# Patient Record
Sex: MALE | Race: WHITE | Employment: FULL TIME | URBAN - METROPOLITAN AREA
[De-identification: names, ages, dates, MRNs, and addresses within clinical notes are randomized per-mention and may not be internally consistent; named-entity substitution may affect disease eponyms.]

---

## 2017-02-03 DIAGNOSIS — E78.5 HYPERLIPIDEMIA LDL GOAL <130: Primary | ICD-10-CM

## 2017-02-03 DIAGNOSIS — E03.8 SUBCLINICAL HYPOTHYROIDISM: ICD-10-CM

## 2017-02-06 RX ORDER — PRAVASTATIN SODIUM 20 MG
TABLET ORAL
Qty: 90 TABLET | Refills: 0 | Status: SHIPPED | OUTPATIENT
Start: 2017-02-06 | End: 2017-02-21

## 2017-02-06 RX ORDER — LEVOTHYROXINE SODIUM 50 UG/1
TABLET ORAL
Qty: 90 TABLET | Refills: 0 | Status: SHIPPED | OUTPATIENT
Start: 2017-02-06 | End: 2017-02-21

## 2017-02-06 NOTE — TELEPHONE ENCOUNTER
levothyroxine (LEVOXYL) 50 MCG tablet  Last Written Prescription Date: 2/12/2016  Last Quantity: 90, # refills: 3  Last Office Visit with [facility], [facility] or  Health prescribing provider: 2/12/2016   Next 5 appointments (look out 90 days)     Feb 21, 2017  8:00 AM   PHYSICAL with Reza Lynn MD   [facility]    [address] [place] [id number]   [phone]                   TSH   Date Value Ref Range Status   02/12/2016 2.00 0.40 - 4.00 mU/L Final     pravastatin (PRAVACHOL) 20 MG tablet  Last Written Prescription Date: 2/12/2016  Last Fill Quantity: 90, # refills: 3  Last Office Visit with [facility], [facility] or [facility] prescribing provider: 2/12/2016   Next 5 appointments (look out 90 days)     Feb 21, 2017  8:00 AM   PHYSICAL with Reza Lynn MD   [facility]    [address] [place] [id number]   [phone]                   CHOL      168   2/12/2016  HDL       42   2/12/2016  LDL       98   2/12/2016  TRIG      138   2/12/2016  CHOLHDLRATIO      3.7   1/30/2015      Refill per RN protocol     Aimee Hernandez RN, BSN  [facility]

## 2017-02-21 ENCOUNTER — OFFICE VISIT (OUTPATIENT)
Dept: FAMILY MEDICINE | Facility: CLINIC | Age: 57
End: 2017-02-21
Payer: COMMERCIAL

## 2017-02-21 VITALS
DIASTOLIC BLOOD PRESSURE: 68 MMHG | WEIGHT: 182 LBS | BODY MASS INDEX: 26.96 KG/M2 | HEART RATE: 81 BPM | OXYGEN SATURATION: 98 % | TEMPERATURE: 97.9 F | SYSTOLIC BLOOD PRESSURE: 108 MMHG | HEIGHT: 69 IN

## 2017-02-21 DIAGNOSIS — Z12.11 SCREEN FOR COLON CANCER: ICD-10-CM

## 2017-02-21 DIAGNOSIS — Z51.81 MEDICATION MONITORING ENCOUNTER: ICD-10-CM

## 2017-02-21 DIAGNOSIS — E03.8 SUBCLINICAL HYPOTHYROIDISM: ICD-10-CM

## 2017-02-21 DIAGNOSIS — E78.5 HYPERLIPIDEMIA LDL GOAL <130: ICD-10-CM

## 2017-02-21 DIAGNOSIS — Z23 NEED FOR PROPHYLACTIC VACCINATION AND INOCULATION AGAINST INFLUENZA: ICD-10-CM

## 2017-02-21 DIAGNOSIS — Z12.5 SCREENING FOR PROSTATE CANCER: ICD-10-CM

## 2017-02-21 DIAGNOSIS — H40.9 GLAUCOMA OF BOTH EYES, UNSPECIFIED GLAUCOMA: ICD-10-CM

## 2017-02-21 DIAGNOSIS — Z00.00 ENCOUNTER FOR ROUTINE ADULT HEALTH EXAMINATION WITHOUT ABNORMAL FINDINGS: Primary | ICD-10-CM

## 2017-02-21 DIAGNOSIS — Z91.09 ENVIRONMENTAL ALLERGIES: ICD-10-CM

## 2017-02-21 DIAGNOSIS — Z11.59 NEED FOR HEPATITIS C SCREENING TEST: ICD-10-CM

## 2017-02-21 DIAGNOSIS — D12.6 ADENOMATOUS POLYP OF COLON: ICD-10-CM

## 2017-02-21 LAB
ALBUMIN SERPL-MCNC: 4 G/DL (ref 3.4–5)
ALBUMIN UR-MCNC: NEGATIVE MG/DL
ALP SERPL-CCNC: 66 U/L (ref 40–150)
ALT SERPL W P-5'-P-CCNC: 34 U/L (ref 0–70)
ANION GAP SERPL CALCULATED.3IONS-SCNC: 7 MMOL/L (ref 3–14)
APPEARANCE UR: CLEAR
AST SERPL W P-5'-P-CCNC: 26 U/L (ref 0–45)
BILIRUB SERPL-MCNC: 1.7 MG/DL (ref 0.2–1.3)
BILIRUB UR QL STRIP: NEGATIVE
BUN SERPL-MCNC: 17 MG/DL (ref 7–30)
CALCIUM SERPL-MCNC: 9.1 MG/DL (ref 8.5–10.1)
CHLORIDE SERPL-SCNC: 108 MMOL/L (ref 94–109)
CHOLEST SERPL-MCNC: 168 MG/DL
CK SERPL-CCNC: 165 U/L (ref 30–300)
CO2 SERPL-SCNC: 27 MMOL/L (ref 20–32)
COLOR UR AUTO: YELLOW
CREAT SERPL-MCNC: 1.14 MG/DL (ref 0.66–1.25)
CREAT UR-MCNC: 231 MG/DL
ERYTHROCYTE [DISTWIDTH] IN BLOOD BY AUTOMATED COUNT: 12.8 % (ref 10–15)
GFR SERPL CREATININE-BSD FRML MDRD: 66 ML/MIN/1.7M2
GLUCOSE SERPL-MCNC: 91 MG/DL (ref 70–99)
GLUCOSE UR STRIP-MCNC: NEGATIVE MG/DL
HCT VFR BLD AUTO: 45.9 % (ref 40–53)
HCV AB SERPL QL IA: NORMAL
HDLC SERPL-MCNC: 41 MG/DL
HGB BLD-MCNC: 15.5 G/DL (ref 13.3–17.7)
HGB UR QL STRIP: ABNORMAL
KETONES UR STRIP-MCNC: NEGATIVE MG/DL
LDLC SERPL CALC-MCNC: 103 MG/DL
LEUKOCYTE ESTERASE UR QL STRIP: NEGATIVE
MCH RBC QN AUTO: 31.5 PG (ref 26.5–33)
MCHC RBC AUTO-ENTMCNC: 33.8 G/DL (ref 31.5–36.5)
MCV RBC AUTO: 93 FL (ref 78–100)
MICROALBUMIN UR-MCNC: 9 MG/L
MICROALBUMIN/CREAT UR: 4.09 MG/G CR (ref 0–17)
MUCOUS THREADS #/AREA URNS LPF: PRESENT /LPF
NITRATE UR QL: NEGATIVE
NONHDLC SERPL-MCNC: 127 MG/DL
PH UR STRIP: 5.5 PH (ref 5–7)
PLATELET # BLD AUTO: 170 10E9/L (ref 150–450)
POTASSIUM SERPL-SCNC: 4.3 MMOL/L (ref 3.4–5.3)
PROT SERPL-MCNC: 7.3 G/DL (ref 6.8–8.8)
PSA SERPL-ACNC: 0.55 UG/L (ref 0–4)
RBC # BLD AUTO: 4.92 10E12/L (ref 4.4–5.9)
RBC #/AREA URNS AUTO: ABNORMAL /HPF (ref 0–2)
SODIUM SERPL-SCNC: 142 MMOL/L (ref 133–144)
SP GR UR STRIP: 1.02 (ref 1–1.03)
T3 SERPL-MCNC: 78 NG/DL (ref 60–181)
T4 FREE SERPL-MCNC: 1.02 NG/DL (ref 0.76–1.46)
TRIGL SERPL-MCNC: 122 MG/DL
TSH SERPL DL<=0.005 MIU/L-ACNC: 2.33 MU/L (ref 0.4–4)
URN SPEC COLLECT METH UR: ABNORMAL
UROBILINOGEN UR STRIP-ACNC: 0.2 EU/DL (ref 0.2–1)
WBC # BLD AUTO: 5.1 10E9/L (ref 4–11)
WBC #/AREA URNS AUTO: ABNORMAL /HPF (ref 0–2)

## 2017-02-21 PROCEDURE — 84480 ASSAY TRIIODOTHYRONINE (T3): CPT | Performed by: FAMILY MEDICINE

## 2017-02-21 PROCEDURE — 81001 URINALYSIS AUTO W/SCOPE: CPT | Performed by: FAMILY MEDICINE

## 2017-02-21 PROCEDURE — 80053 COMPREHEN METABOLIC PANEL: CPT | Performed by: FAMILY MEDICINE

## 2017-02-21 PROCEDURE — 99396 PREV VISIT EST AGE 40-64: CPT | Performed by: FAMILY MEDICINE

## 2017-02-21 PROCEDURE — 82043 UR ALBUMIN QUANTITATIVE: CPT | Performed by: FAMILY MEDICINE

## 2017-02-21 PROCEDURE — 84443 ASSAY THYROID STIM HORMONE: CPT | Performed by: FAMILY MEDICINE

## 2017-02-21 PROCEDURE — 84439 ASSAY OF FREE THYROXINE: CPT | Performed by: FAMILY MEDICINE

## 2017-02-21 PROCEDURE — 86803 HEPATITIS C AB TEST: CPT | Performed by: FAMILY MEDICINE

## 2017-02-21 PROCEDURE — G0103 PSA SCREENING: HCPCS | Performed by: FAMILY MEDICINE

## 2017-02-21 PROCEDURE — 85027 COMPLETE CBC AUTOMATED: CPT | Performed by: FAMILY MEDICINE

## 2017-02-21 PROCEDURE — 82550 ASSAY OF CK (CPK): CPT | Performed by: FAMILY MEDICINE

## 2017-02-21 PROCEDURE — 36415 COLL VENOUS BLD VENIPUNCTURE: CPT | Performed by: FAMILY MEDICINE

## 2017-02-21 PROCEDURE — 80061 LIPID PANEL: CPT | Performed by: FAMILY MEDICINE

## 2017-02-21 RX ORDER — PRAVASTATIN SODIUM 20 MG
20 TABLET ORAL DAILY
Qty: 90 TABLET | Refills: 3 | Status: SHIPPED | OUTPATIENT
Start: 2017-02-21 | End: 2018-02-23

## 2017-02-21 RX ORDER — LEVOTHYROXINE SODIUM 50 UG/1
50 TABLET ORAL DAILY
Qty: 90 TABLET | Refills: 3 | Status: SHIPPED | OUTPATIENT
Start: 2017-02-21 | End: 2018-02-23

## 2017-02-21 NOTE — PROGRESS NOTES
SUBJECTIVE:     CC: Tavo Johnson is an 56 year old male who presents for preventative health visit.     Seasonal allergies -- no recent problems.     Hyperlipidemia -- controlled with pravastatin.    Recent Labs   Lab Test  02/12/16   0811  01/30/15   0815  12/31/13   0809   CHOL  168  162  173   HDL  42  44  41   LDL  98  89  106   TRIG  138  143  128   CHOLHDLRATIO   --   3.7  4.2     Hypothyroidism -- controlled with levothyroxine.     TSH   Date Value Ref Range Status   02/12/2016 2.00 0.40 - 4.00 mU/L Final     Glaucoma -- bilaterally, monitored by [facility].    PVC -- stable, no problems.    Adenomatous colon polyp -- monitored by colonoscopies every 5 years.     FH CAD - no cp, no sob, no edema, declines stress echo this year    Physical   Annual:     Getting at least 3 servings of Calcium per day::  Yes    Bi-annual eye exam::  Yes    Dental care twice a year::  Yes    Sleep apnea or symptoms of sleep apnea::  Daytime drowsiness    Diet::  Regular (no restrictions)    Frequency of exercise::  2-3 days/week    Duration of exercise::  30-45 minutes    Taking medications regularly::  Yes    Medication side effects::  None    Additional concerns today::  No    Hyperlipidemia Follow-Up      Rate your low fat/cholesterol diet?: fair    Taking statin?  Yes, no muscle aches from statin    Other lipid medications/supplements?:  none     Health Maintenance     Colonoscopy:  Every 5 years, last 10/14 (due 10/19)   FIT:  Yearly, last 12/11 (due)              PSA:  Yearly, last 2/16 (due)   DEXA:  N/a    Health Maintenance Due   Topic Date Due     HEPATITIS C SCREENING  06/17/1976     FIT Q1 YR (NO INBASKET)  12/19/2012     INFLUENZA VACCINE (SYSTEM ASSIGNED)  09/01/2016     ADVANCE DIRECTIVE PLANNING Q5 YRS (NO INBASKET)  12/05/2016     TSH Q1 YEAR (NO INBASKET)  02/12/2017     LIPID MONITORING Q1 YEAR( NO INBASKET)  02/12/2017     PSA Q1 YR  02/12/2017     WELLNESS VISIT Q1 YR (NO INBASKET)  02/12/2017        Current Problem List    Patient Active Problem List   Diagnosis     Glaucoma     Seasonal allergies     Adenomatous polyp of colon     Subclinical hypothyroidism     Hyperlipidemia LDL goal <130     Advanced directives, counseling/discussion     PVC (premature ventricular contraction)     Environmental allergies       Past Medical History    Past Medical History   Diagnosis Date     Adenomatous polyp of colon 9/09, 10/14     tubular - due 5 yrs     Environmental allergies      seasonal - spring     Hyperlipidemia LDL goal <130      borderline     PVC (premature ventricular contraction) 12/15     Subclinical hypothyroidism 9/09     Unspecified glaucoma 05/2005     [facility] - bilateral       Past Surgical History    Past Surgical History   Procedure Laterality Date     Surgical history of -   6/87     HERNIA - JUNIOR     Surgical history of -   1996     RT SHOULDER - Dr Julien     Colonoscopy  9/09, 10/14     adenomatous polyps - due 5 yrs     Stress echo (metro)  3/07, 10/09, 12/11, 1/14     normal       Current Medications    Current Outpatient Prescriptions   Medication Sig Dispense Refill     pravastatin (PRAVACHOL) 20 MG tablet Take 1 tablet (20 mg) by mouth daily 90 tablet 3     levothyroxine (SYNTHROID/LEVOTHROID) 50 MCG tablet Take 1 tablet (50 mcg) by mouth daily 90 tablet 3     ASPIRIN 81 MG OR TABS ONE DAILY 100 3     XALATAN 0.005 % OP SOLN None Entered       [DISCONTINUED] pravastatin (PRAVACHOL) 20 MG tablet TAKE 1 TABLET DAILY 90 tablet 0     [DISCONTINUED] levothyroxine (SYNTHROID/LEVOTHROID) 50 MCG tablet TAKE 1 TABLET DAILY 90 tablet 0       Allergies    Allergies   Allergen Reactions     Amoxicillin      rash     Azithromycin Tinnitus     ?       Immunizations    Immunization History   Administered Date(s) Administered     Influenza (IIV3) 11/01/2014     TD (ADULT, 7+) 11/18/1996     TDAP (ADACEL AGES 11-64) 02/23/2007     Tdap (Adacel,Boostrix) 09/26/2015     Twinrix A/B 12/31/2013,  02/06/2014, 07/05/2014       Family History    Family History   Problem Relation Age of Onset     C.A.D. Father 61     age 61     C.A.LUIGI Brother 42     age 42     CANCER Paternal Grandfather      stomach ca     CANCER Paternal Grandmother      stomach ca     Pancreatic Cancer Other      1st cousin       Social History    Social History     Social History     Marital status:      Spouse name: Maribell     Number of children: 3     Years of education: 16     Occupational History      [employer]     Social History Main Topics     Smoking status: Never Smoker     Smokeless tobacco: Never Used     Alcohol use 1.2 - 1.8 oz/week     2 - 3 Standard drinks or equivalent per week      Comment: 2-3 drinks per week avg     Drug use: No     Sexual activity: Yes     Partners: Female     Birth control/ protection: Male Surgical     Other Topics Concern      Service No     Blood Transfusions No     Caffeine Concern Yes     1-2 cups qd     Occupational Exposure No     Hobby Hazards No     Sleep Concern No     Stress Concern No     Weight Concern No     Exercise Yes     lifts and treadmill - Wii Fit - snap fitness - 2-3/week     Seat Belt Yes     Parent/Sibling W/ Cabg, Mi Or Angioplasty Before 65f 55m? Yes     Social History Narrative     Today's PHQ-2 Score: Answers for HPI/ROS submitted by the patient on 2/20/2017   PHQ-2 Depressed: Not at all, Not at all  PHQ-2 Score: 0    PHQ-2 ( 1999 Pfizer) 2/20/2017   Little interest or pleasure in doing things Not at all   Feeling down, depressed or hopeless Not at all   PHQ-2 Score 0       Abuse: Current or Past(Physical, Sexual or Emotional)- No  Do you feel safe in your environment - Yes    Social History   Substance Use Topics     Smoking status: Never Smoker     Smokeless tobacco: Never Used     Alcohol use 1.2 - 1.8 oz/week     2 - 3 Standard drinks or equivalent per week      Comment: 2-3 drinks per week avg     The patient does not drink >3 drinks per day nor >7  "drinks per week.    Last PSA:   PSA   Date Value Ref Range Status   02/12/2016 0.54 0 - 4 ug/L Final       Recent Labs   Lab Test  02/12/16   0811  01/30/15   0815  12/31/13   0809   CHOL  168  162  173   HDL  42  44  41   LDL  98  89  106   TRIG  138  143  128   CHOLHDLRATIO   --   3.7  4.2   NHDL  126   --    --        Reviewed orders with patient. Reviewed health maintenance and updated orders accordingly - Yes    All Histories reviewed and updated in Epic.    ROS:  10 point ROS of systems including Constitutional, Eyes, Respiratory, Cardiovascular, Gastroenterology, Genitourinary, Integumentary, Muscularskeletal, Psychiatric were all negative except for pertinent positives noted in my HPI.    OBJECTIVE:     /68  Pulse 81  Temp 97.9  F (36.6  C) (Oral)  Ht 5' 9\" (1.753 m)  Wt 182 lb (82.6 kg)  SpO2 98%  BMI 26.88 kg/m2  EXAM:  GENERAL: healthy, alert and no distress  EYES: Eyes grossly normal to inspection, PERRL and conjunctivae and sclerae normal  HENT: ear canals and TM's normal, nose and mouth without ulcers or lesions  RESP: lungs clear to auscultation - no rales, rhonchi or wheezes  CV: regular rate and rhythm, normal S1 S2, no S3 or S4, no murmur, click or rub, no peripheral edema and peripheral pulses strong- PVC  ABDOMEN: soft, nontender, no hepatosplenomegaly, no masses and bowel sounds normal   (male): normal male genitalia without lesions or urethral discharge, no hernia - mildly tender rt epididymis  RECTAL: normal sphincter tone, no rectal masses, prostate trace increased size, smooth, nontender without nodules or masses  MS: no gross musculoskeletal defects noted, no edema  SKIN: no suspicious lesions or rashes  NEURO: mentation intact and speech normal  PSYCH: mentation appears normal, affect normal/bright    Results for orders placed or performed in visit on 02/21/17 (from the past 24 hour(s))   CBC with platelets   Result Value Ref Range    WBC 5.1 4.0 - 11.0 10e9/L    RBC Count 4.92 4.4 " - 5.9 10e12/L    Hemoglobin 15.5 13.3 - 17.7 g/dL    Hematocrit 45.9 40.0 - 53.0 %    MCV 93 78 - 100 fl    MCH 31.5 26.5 - 33.0 pg    MCHC 33.8 31.5 - 36.5 g/dL    RDW 12.8 10.0 - 15.0 %    Platelet Count 170 150 - 450 10e9/L   *UA reflex to Microscopic   Result Value Ref Range    Color Urine Yellow     Appearance Urine Clear     Glucose Urine Negative NEG mg/dL    Bilirubin Urine Negative NEG    Ketones Urine Negative NEG mg/dL    Specific Gravity Urine 1.020 1.003 - 1.035    Blood Urine Trace (A) NEG    pH Urine 5.5 5.0 - 7.0 pH    Protein Albumin Urine Negative NEG mg/dL    Urobilinogen Urine 0.2 0.2 - 1.0 EU/dL    Nitrite Urine Negative NEG    Leukocyte Esterase Urine Negative NEG    Source Midstream Urine    Urine Microscopic   Result Value Ref Range    WBC Urine O - 2 0 - 2 /HPF    RBC Urine O - 2 0 - 2 /HPF    Mucous Urine Present (A) NEG /LPF     ASSESSMENT/PLAN:         ICD-10-CM    1. Encounter for routine adult health examination without abnormal findings Z00.00 Comprehensive metabolic panel     Lipid panel reflex to direct LDL     CK total     CBC with platelets     *UA reflex to Microscopic     Albumin Random Urine Quantitative     TSH with free T4 reflex     Prostate spec antigen screen     Fecal colorectal cancer screen (FIT)     T4, free     T3, total     Urine Microscopic   2. Hyperlipidemia LDL goal <130 E78.5 Comprehensive metabolic panel     Lipid panel reflex to direct LDL     CK total     pravastatin (PRAVACHOL) 20 MG tablet   3. Subclinical hypothyroidism E03.9 TSH with free T4 reflex     T4, free     T3, total     levothyroxine (SYNTHROID/LEVOTHROID) 50 MCG tablet   4. Environmental allergies Z91.09    5. Glaucoma of both eyes, unspecified glaucoma H40.9    6. Adenomatous polyp of colon D12.6 Fecal colorectal cancer screen (FIT)   7. Screening for prostate cancer Z12.5 Prostate spec antigen screen   8. Screen for colon cancer Z12.11 Fecal colorectal cancer screen (FIT)   9. Medication  monitoring encounter Z51.81 Comprehensive metabolic panel     Lipid panel reflex to direct LDL     CK total     CBC with platelets     *UA reflex to Microscopic     Albumin Random Urine Quantitative     TSH with free T4 reflex     T4, free     T3, total   10. Need for hepatitis C screening test Z11.59 Hepatitis C Screen Reflex to HCV RNA Quant and Genotype   11. Need for prophylactic vaccination and inoculation against influenza Z23      Discussed treatment/modality options, including risk and benefits, he desires advised alcohol consumption 1oz per day or less, advised aspirin 81 mg po daily, advised 1 multivitamin per day, advised calcium 1437-5730 mg/d and Vitamin D 800-1200 IU/d, advised dentist every 6 months, advised diet and exercise, advised opthalmologist every 1-2 years, advised self testicular exam q month, further health care maintenance and medication refill(pravastatin, levothyroxine). All diagnosis above reviewed and noted above, otherwise stable.  [facility].  Follow up as needed - every 4-6 months.    Refilled pravastatin and levothyroxine.     Tavo declined the influenza vaccine today.     Tavo declined scheduling a stress test.     Health Maintenance Due   Topic Date Due     HEPATITIS C SCREENING  06/17/1976     FIT Q1 YR (NO INBASKET)  12/19/2012     INFLUENZA VACCINE (SYSTEM ASSIGNED)  09/01/2016     ADVANCE DIRECTIVE PLANNING Q5 YRS (NO INBASKET)  12/05/2016     TSH Q1 YEAR (NO INBASKET)  02/12/2017     LIPID MONITORING Q1 YEAR( NO INBASKET)  02/12/2017     PSA Q1 YR  02/12/2017     WELLNESS VISIT Q1 YR (NO INBASKET)  02/12/2017       COUNSELING:   Reviewed preventive health counseling, as reflected in patient instructions       Regular exercise       Healthy diet/nutrition       Vision screening       Hearing screening       Aspirin Prophylaxsis       Alcohol Use     reports that he has never smoked. He has never used smokeless tobacco.    Estimated body mass  "index is 26.88 kg/(m^2) as calculated from the following:    Height as of this encounter: 5' 9\" (1.753 m).    Weight as of this encounter: 182 lb (82.6 kg).     Counseling Resources:  ATP IV Guidelines  Pooled Cohorts Equation Calculator  FRAX Risk Assessment  ICSI Preventive Guidelines  Dietary Guidelines for Americans, 2010  USDA's MyPlate  ASA Prophylaxis  Lung CA Screening    This document serves as a record of the services and decisions personally performed and made by Reza Lynn MD [facility]. It was created on their behalf by Aletha Pedroza, a trained medical scribe. The creation of this document is based the provider's statements to the medical scribe.  Aletha Pedroza February 21, 2017 8:17 AM              [facility] Lynn MD, [facility]    [address]  [address]    [phone] Fax    "

## 2017-02-21 NOTE — MR AVS SNAPSHOT
After Visit Summary   2/21/2017    Tavo Johnson    MRN: [id number]           Patient Information     Date Of Birth          1960        Visit Information        Provider Department      2/21/2017 8:00 AM Reza Lynn MD [facility]        Today's Diagnoses     Encounter for routine adult health examination without abnormal findings    -  1    Hyperlipidemia LDL goal <130        Subclinical hypothyroidism        Environmental allergies        Glaucoma, unspecified glaucoma, unspecified laterality        Adenomatous polyp of colon        Screening for prostate cancer        Screen for colon cancer        Medication monitoring encounter        Need for hepatitis C screening test        Need for prophylactic vaccination and inoculation against influenza          Care Instructions        [facility]                        To reach your care team during and after hours:   [phone]  To reach our pharmacy:        [phone]    Clinic Hours                        Our clinic hours are:    Monday   7:30 am to 7:00 pm                  Tuesday through Friday 7:30 am to 5:00 pm                             Saturday   8:00 am to 12:00 pm      Sunday   Closed      Pharmacy Hours                        Our pharmacy hours are:    Monday   8:30 am to 7:00 pm       Tuesday to Friday  8:30 am to 6:00 pm                       Saturday    9:00 am to 1:00 pm              Sunday    Closed              There is also information available at our web site:  [email or web address]    If your provider ordered any lab tests and you do not receive the results within 10 business days, please call the clinic.    If you need a medication refill please contact your pharmacy.  Please allow 2-3 business days for your refill to be completed.    Our clinic offers telephone visits and e visits.  Please ask one of your team members to explain more.      Use [employer] (secure email communication and access  to your chart) to send your primary care provider a message or make an appointment. Ask someone on your Team how to sign up for [facility].  Immunizations                      Immunization History   Administered Date(s) Administered     Influenza (IIV3) 11/01/2014     TD (ADULT, 7+) 11/18/1996     TDAP (ADACEL AGES 11-64) 02/23/2007     Tdap (Adacel,Boostrix) 09/26/2015     Twinrix A/B 12/31/2013, 02/06/2014, 07/05/2014        Health Maintenance                         Health Maintenance Due   Topic Date Due     Hepatitis C Screening  06/17/1976     Colon Cancer Screening - FIT Test - yearly  12/19/2012     Flu Vaccine - yearly  09/01/2016     Discuss Advance Directive Planning  12/05/2016     Thyroid Function Lab (TSH) - yearly  02/12/2017     Cholesterol Lab - yearly  02/12/2017     Prostate Test (PSA) - yearly  02/12/2017     Wellness Visit with your Primary Provider - yearly  02/12/2017       Preventive Health Recommendations  Male Ages 50 - 64    Yearly exam:             See your health care provider every year in order to  o   Review health changes.   o   Discuss preventive care.    o   Review your medicines if your doctor has prescribed any.     Have a cholesterol test every 5 years, or more frequently if you are at risk for high cholesterol/heart disease.     Have a diabetes test (fasting glucose) every three years. If you are at risk for diabetes, you should have this test more often.     Have a colonoscopy at age 50, or have a yearly FIT test (stool test). These exams will check for colon cancer.      Talk with your health care provider about whether or not a prostate cancer screening test (PSA) is right for you.    You should be tested each year for STDs (sexually transmitted diseases), if you re at risk.     Shots: Get a flu shot each year. Get a tetanus shot every 10 years.     Nutrition:    Eat at least 5 servings of fruits and vegetables daily.     Eat whole-grain bread, whole-wheat pasta and brown rice  instead of white grains and rice.     Talk to your provider about Calcium and Vitamin D.     Lifestyle    Exercise for at least 150 minutes a week (30 minutes a day, 5 days a week). This will help you control your weight and prevent disease.     Limit alcohol to one drink per day.     No smoking.     Wear sunscreen to prevent skin cancer.     See your dentist every six months for an exam and cleaning.     See your eye doctor every 1 to 2 years.          Follow-ups after your visit        Future tests that were ordered for you today     Open Future Orders        Priority Expected Expires Ordered    Fecal colorectal cancer screen (FIT) Routine 3/14/2017 5/16/2017 2/21/2017            Who to contact     If you have questions or need follow up information about today's clinic visit or your schedule please contact [facility] directly at [phone].  Normal or non-critical lab and imaging results will be communicated to you by [employer], letter or phone within 4 business days after the clinic has received the results. If you do not hear from us within 7 days, please contact the clinic through [employer] or phone. If you have a critical or abnormal lab result, we will notify you by phone as soon as possible.  Submit refill requests through [employer] or call your pharmacy and they will forward the refill request to us. Please allow 3 business days for your refill to be completed.          Additional Information About Your Visit        [employer]ia Information     [employer]ia gives you secure access to your electronic health record. If you see a primary care provider, you can also send messages to your care team and make appointments. If you have questions, please call your primary care clinic.  If you do not have a primary care provider, please call [phone] and they will assist you.        Care EveryWhere ID     This is your Care EveryWhere ID. This could be used by other organizations to access your [facility]  "medical records  [id number]        Your Vitals Were     Pulse Temperature Height Pulse Oximetry BMI (Body Mass Index)       81 97.9  F (36.6  C) (Oral) 5' 9\" (1.753 m) 98% 26.88 kg/m2        Blood Pressure from Last 3 Encounters:   02/21/17 108/68   02/12/16 118/80   01/18/16 128/84    Weight from Last 3 Encounters:   02/21/17 182 lb (82.6 kg)   02/12/16 183 lb 2 oz (83.1 kg)   01/18/16 185 lb (83.9 kg)              We Performed the Following     *UA reflex to Microscopic     Albumin Random Urine Quantitative     CBC with platelets     CK total     Comprehensive metabolic panel     Hepatitis C Screen Reflex to HCV RNA Quant and Genotype     Lipid panel reflex to direct LDL     Prostate spec antigen screen     T3, total     T4, free     TSH with free T4 reflex     Urine Microscopic          Today's Medication Changes          These changes are accurate as of: 2/21/17  8:51 AM.  If you have any questions, ask your nurse or doctor.               These medicines have changed or have updated prescriptions.        Dose/Directions    levothyroxine 50 MCG tablet   Commonly known as:  SYNTHROID/LEVOTHROID   This may have changed:  See the new instructions.   Used for:  Subclinical hypothyroidism   Changed by:  Reza Lynn MD        Dose:  50 mcg   Take 1 tablet (50 mcg) by mouth daily   Quantity:  90 tablet   Refills:  3       pravastatin 20 MG tablet   Commonly known as:  PRAVACHOL   This may have changed:  See the new instructions.   Used for:  Hyperlipidemia LDL goal <130   Changed by:  Reza Lynn MD        Dose:  20 mg   Take 1 tablet (20 mg) by mouth daily   Quantity:  90 tablet   Refills:  3         Stop taking these medicines if you haven't already. Please contact your care team if you have questions.     triamcinolone 0.5 % cream   Commonly known as:  KENALOG   Stopped by:  Reza Lynn MD                Where to get your medicines      These medications were sent to [facility] - [facility] [place] " - [address]  [address] [address]     Phone:  [phone]     levothyroxine 50 MCG tablet    pravastatin 20 MG tablet                Primary Care Provider Office Phone # Fax #    Reza Lynn [phone]       [address] [address]        Thank you!     Thank you for choosing [facility]  for your care. Our goal is always to provide you with excellent care. Hearing back from our patients is one way we can continue to improve our services. Please take a few minutes to complete the written survey that you may receive in the mail after your visit with us. Thank you!             Your Updated Medication List - Protect others around you: Learn how to safely use, store and throw away your medicines at www.disposemymeds.org.          This list is accurate as of: 2/21/17  8:51 AM.  Always use your most recent med list.                   Brand Name Dispense Instructions for use    aspirin 81 MG tablet     100    ONE DAILY       levothyroxine 50 MCG tablet    SYNTHROID/LEVOTHROID    90 tablet    Take 1 tablet (50 mcg) by mouth daily       pravastatin 20 MG tablet    PRAVACHOL    90 tablet    Take 1 tablet (20 mg) by mouth daily       XALATAN 0.005 % ophthalmic solution   Generic drug:  latanoprost      None Entered

## 2017-02-21 NOTE — NURSING NOTE
"Chief Complaint   Patient presents with     Physical       Initial /68  Pulse 81  Temp 97.9  F (36.6  C) (Oral)  Ht 5' 9\" (1.753 m)  Wt 182 lb (82.6 kg)  SpO2 98%  BMI 26.88 kg/m2 Estimated body mass index is 26.88 kg/(m^2) as calculated from the following:    Height as of this encounter: 5' 9\" (1.753 m).    Weight as of this encounter: 182 lb (82.6 kg)..  BP completed using cuff size: roderick Dozier MA  "

## 2017-02-21 NOTE — PATIENT INSTRUCTIONS
[facility]                        To reach your care team during and after hours:   [phone]  To reach our pharmacy:        [phone]    Clinic Hours                        Our clinic hours are:    Monday   7:30 am to 7:00 pm                  Tuesday through Friday 7:30 am to 5:00 pm                             Saturday   8:00 am to 12:00 pm      Sunday   Closed      Pharmacy Hours                        Our pharmacy hours are:    Monday   8:30 am to 7:00 pm       Tuesday to Friday  8:30 am to 6:00 pm                       Saturday    9:00 am to 1:00 pm              Sunday    Closed              There is also information available at our web site:  [email or web address]    If your provider ordered any lab tests and you do not receive the results within 10 business days, please call the clinic.    If you need a medication refill please contact your pharmacy.  Please allow 2-3 business days for your refill to be completed.    Our clinic offers telephone visits and e visits.  Please ask one of your team members to explain more.      Use [employer] (secure email communication and access to your chart) to send your primary care provider a message or make an appointment. Ask someone on your Team how to sign up for [employer].  Immunizations                      Immunization History   Administered Date(s) Administered     Influenza (IIV3) 11/01/2014     TD (ADULT, 7+) 11/18/1996     TDAP (ADACEL AGES 11-64) 02/23/2007     Tdap (Adacel,Boostrix) 09/26/2015     Twinrix A/B 12/31/2013, 02/06/2014, 07/05/2014        Health Maintenance                         Health Maintenance Due   Topic Date Due     Hepatitis C Screening  06/17/1976     Colon Cancer Screening - FIT Test - yearly  12/19/2012     Flu Vaccine - yearly  09/01/2016     Discuss Advance Directive Planning  12/05/2016     Thyroid Function Lab (TSH) - yearly  02/12/2017     Cholesterol Lab - yearly  02/12/2017     Prostate Test (PSA) - yearly   02/12/2017     Wellness Visit with your Primary Provider - yearly  02/12/2017       Preventive Health Recommendations  Male Ages 50 - 64    Yearly exam:             See your health care provider every year in order to  o   Review health changes.   o   Discuss preventive care.    o   Review your medicines if your doctor has prescribed any.     Have a cholesterol test every 5 years, or more frequently if you are at risk for high cholesterol/heart disease.     Have a diabetes test (fasting glucose) every three years. If you are at risk for diabetes, you should have this test more often.     Have a colonoscopy at age 50, or have a yearly FIT test (stool test). These exams will check for colon cancer.      Talk with your health care provider about whether or not a prostate cancer screening test (PSA) is right for you.    You should be tested each year for STDs (sexually transmitted diseases), if you re at risk.     Shots: Get a flu shot each year. Get a tetanus shot every 10 years.     Nutrition:    Eat at least 5 servings of fruits and vegetables daily.     Eat whole-grain bread, whole-wheat pasta and brown rice instead of white grains and rice.     Talk to your provider about Calcium and Vitamin D.     Lifestyle    Exercise for at least 150 minutes a week (30 minutes a day, 5 days a week). This will help you control your weight and prevent disease.     Limit alcohol to one drink per day.     No smoking.     Wear sunscreen to prevent skin cancer.     See your dentist every six months for an exam and cleaning.     See your eye doctor every 1 to 2 years.

## 2017-02-21 NOTE — LETTER
[facility]  [address] [place] [address], [place] [address]                  [phone]   February 23, 2017    Tavo Johnson  [address] [place] [id number]      Dear Tavo,    Here is a summary of your recent test results:    Labs are overall quite good, except mild benign elevation of bilirubin.     We advise:     Continue current cares.   Balanced low cholesterol diet and regular exercise.   Recheck bilirubin in 3-4 months.     Your test results are enclosed.      Please contact me if you have any questions.      Please call us at [phone] (or use [employer]) to address the above recommendations.            Thank you very much for trusting [facility]..     Healthy regards,        Reza Lynn M.D.        Results for orders placed or performed in visit on 02/21/17   Comprehensive metabolic panel   Result Value Ref Range    Sodium 142 133 - 144 mmol/L    Potassium 4.3 3.4 - 5.3 mmol/L    Chloride 108 94 - 109 mmol/L    Carbon Dioxide 27 20 - 32 mmol/L    Anion Gap 7 3 - 14 mmol/L    Glucose 91 70 - 99 mg/dL    Urea Nitrogen 17 7 - 30 mg/dL    Creatinine 1.14 0.66 - 1.25 mg/dL    GFR Estimate 66 >60 mL/min/1.7m2    GFR Estimate If Black 80 >60 mL/min/1.7m2    Calcium 9.1 8.5 - 10.1 mg/dL    Bilirubin Total 1.7 (H) 0.2 - 1.3 mg/dL    Albumin 4.0 3.4 - 5.0 g/dL    Protein Total 7.3 6.8 - 8.8 g/dL    Alkaline Phosphatase 66 40 - 150 U/L    ALT 34 0 - 70 U/L    AST 26 0 - 45 U/L   Lipid panel reflex to direct LDL   Result Value Ref Range    Cholesterol 168 <200 mg/dL    Triglycerides 122 <150 mg/dL    HDL Cholesterol 41 >39 mg/dL    LDL Cholesterol Calculated 103 (H) <100 mg/dL    Non HDL Cholesterol 127 <130 mg/dL   CK total   Result Value Ref Range    CK Total 165 30 - 300 U/L   CBC with platelets   Result Value Ref Range    WBC 5.1 4.0 - 11.0 10e9/L    RBC Count 4.92 4.4 - 5.9 10e12/L    Hemoglobin 15.5 13.3 - 17.7 g/dL    Hematocrit 45.9 40.0 -  53.0 %    MCV 93 78 - 100 fl    MCH 31.5 26.5 - 33.0 pg    MCHC 33.8 31.5 - 36.5 g/dL    RDW 12.8 10.0 - 15.0 %    Platelet Count 170 150 - 450 10e9/L   *UA reflex to Microscopic   Result Value Ref Range    Color Urine Yellow     Appearance Urine Clear     Glucose Urine Negative NEG mg/dL    Bilirubin Urine Negative NEG    Ketones Urine Negative NEG mg/dL    Specific Gravity Urine 1.020 1.003 - 1.035    Blood Urine Trace (A) NEG    pH Urine 5.5 5.0 - 7.0 pH    Protein Albumin Urine Negative NEG mg/dL    Urobilinogen Urine 0.2 0.2 - 1.0 EU/dL    Nitrite Urine Negative NEG    Leukocyte Esterase Urine Negative NEG    Source Midstream Urine    Albumin Random Urine Quantitative   Result Value Ref Range    Creatinine Urine 231 mg/dL    Albumin Urine mg/L 9 mg/L    Albumin Urine mg/g Cr 4.09 0 - 17 mg/g Cr   TSH with free T4 reflex   Result Value Ref Range    TSH 2.33 0.40 - 4.00 mU/L   Prostate spec antigen screen   Result Value Ref Range    PSA 0.55 0 - 4 ug/L   T4, free   Result Value Ref Range    T4 Free 1.02 0.76 - 1.46 ng/dL   T3, total   Result Value Ref Range    Triiodothyronine (T3) 78 60 - 181 ng/dL   Hepatitis C Screen Reflex to HCV RNA Quant and Genotype   Result Value Ref Range    Hepatitis C Antibody  NR     Nonreactive   Assay performance characteristics have not been established for newborns,   infants, and children     Urine Microscopic   Result Value Ref Range    WBC Urine O - 2 0 - 2 /HPF    RBC Urine O - 2 0 - 2 /HPF    Mucous Urine Present (A) NEG /LPF

## 2017-02-27 PROCEDURE — 82274 ASSAY TEST FOR BLOOD FECAL: CPT | Performed by: FAMILY MEDICINE

## 2017-02-28 DIAGNOSIS — D12.6 ADENOMATOUS POLYP OF COLON: ICD-10-CM

## 2017-02-28 DIAGNOSIS — Z12.11 SCREEN FOR COLON CANCER: ICD-10-CM

## 2017-02-28 DIAGNOSIS — Z00.00 ENCOUNTER FOR ROUTINE ADULT HEALTH EXAMINATION WITHOUT ABNORMAL FINDINGS: ICD-10-CM

## 2017-03-01 LAB — HEMOCCULT STL QL IA: NEGATIVE

## 2018-02-23 ENCOUNTER — OFFICE VISIT (OUTPATIENT)
Dept: FAMILY MEDICINE | Facility: CLINIC | Age: 58
End: 2018-02-23
Payer: COMMERCIAL

## 2018-02-23 VITALS
SYSTOLIC BLOOD PRESSURE: 102 MMHG | WEIGHT: 181 LBS | TEMPERATURE: 97.6 F | HEIGHT: 69 IN | BODY MASS INDEX: 26.81 KG/M2 | HEART RATE: 85 BPM | OXYGEN SATURATION: 99 % | DIASTOLIC BLOOD PRESSURE: 72 MMHG

## 2018-02-23 DIAGNOSIS — Z00.00 ENCOUNTER FOR ROUTINE ADULT HEALTH EXAMINATION WITHOUT ABNORMAL FINDINGS: Primary | ICD-10-CM

## 2018-02-23 DIAGNOSIS — Z12.11 SCREEN FOR COLON CANCER: ICD-10-CM

## 2018-02-23 DIAGNOSIS — Z12.5 SCREENING FOR PROSTATE CANCER: ICD-10-CM

## 2018-02-23 DIAGNOSIS — Z51.81 MEDICATION MONITORING ENCOUNTER: ICD-10-CM

## 2018-02-23 DIAGNOSIS — E78.5 HYPERLIPIDEMIA LDL GOAL <130: ICD-10-CM

## 2018-02-23 DIAGNOSIS — D12.6 ADENOMATOUS POLYP OF COLON, UNSPECIFIED PART OF COLON: ICD-10-CM

## 2018-02-23 DIAGNOSIS — I49.3 PVC (PREMATURE VENTRICULAR CONTRACTION): ICD-10-CM

## 2018-02-23 DIAGNOSIS — E03.8 SUBCLINICAL HYPOTHYROIDISM: ICD-10-CM

## 2018-02-23 DIAGNOSIS — Z91.09 ENVIRONMENTAL ALLERGIES: ICD-10-CM

## 2018-02-23 DIAGNOSIS — H40.9 GLAUCOMA OF BOTH EYES, UNSPECIFIED GLAUCOMA TYPE: ICD-10-CM

## 2018-02-23 LAB
ERYTHROCYTE [DISTWIDTH] IN BLOOD BY AUTOMATED COUNT: 12.6 % (ref 10–15)
HCT VFR BLD AUTO: 45.3 % (ref 40–53)
HGB BLD-MCNC: 15 G/DL (ref 13.3–17.7)
MCH RBC QN AUTO: 31.2 PG (ref 26.5–33)
MCHC RBC AUTO-ENTMCNC: 33.1 G/DL (ref 31.5–36.5)
MCV RBC AUTO: 94 FL (ref 78–100)
PLATELET # BLD AUTO: 181 10E9/L (ref 150–450)
RBC # BLD AUTO: 4.81 10E12/L (ref 4.4–5.9)
WBC # BLD AUTO: 6.3 10E9/L (ref 4–11)

## 2018-02-23 PROCEDURE — 84439 ASSAY OF FREE THYROXINE: CPT | Performed by: FAMILY MEDICINE

## 2018-02-23 PROCEDURE — 84443 ASSAY THYROID STIM HORMONE: CPT | Performed by: FAMILY MEDICINE

## 2018-02-23 PROCEDURE — 82550 ASSAY OF CK (CPK): CPT | Performed by: FAMILY MEDICINE

## 2018-02-23 PROCEDURE — 85027 COMPLETE CBC AUTOMATED: CPT | Performed by: FAMILY MEDICINE

## 2018-02-23 PROCEDURE — 82043 UR ALBUMIN QUANTITATIVE: CPT | Performed by: FAMILY MEDICINE

## 2018-02-23 PROCEDURE — 99396 PREV VISIT EST AGE 40-64: CPT | Performed by: FAMILY MEDICINE

## 2018-02-23 PROCEDURE — 86376 MICROSOMAL ANTIBODY EACH: CPT | Performed by: FAMILY MEDICINE

## 2018-02-23 PROCEDURE — G0103 PSA SCREENING: HCPCS | Performed by: FAMILY MEDICINE

## 2018-02-23 PROCEDURE — 80053 COMPREHEN METABOLIC PANEL: CPT | Performed by: FAMILY MEDICINE

## 2018-02-23 PROCEDURE — 80061 LIPID PANEL: CPT | Performed by: FAMILY MEDICINE

## 2018-02-23 PROCEDURE — 36415 COLL VENOUS BLD VENIPUNCTURE: CPT | Performed by: FAMILY MEDICINE

## 2018-02-23 RX ORDER — PRAVASTATIN SODIUM 20 MG
20 TABLET ORAL DAILY
Qty: 90 TABLET | Refills: 3 | Status: SHIPPED | OUTPATIENT
Start: 2018-02-23 | End: 2019-02-28

## 2018-02-23 RX ORDER — LEVOTHYROXINE SODIUM 50 UG/1
50 TABLET ORAL DAILY
Qty: 90 TABLET | Refills: 3 | Status: SHIPPED | OUTPATIENT
Start: 2018-02-23 | End: 2019-02-28

## 2018-02-23 NOTE — LETTER
[facility]  [address] [place] [address], [place] [address]                  [phone]   March 2, 2018    Tavo Johnson  [address] [facility] [place] [id number]      Dear Tavo,    Here is a summary of your recent test results:    Labs are overall quite good.     We advise:     Continue current cares.   Balanced low cholesterol diet.   Regular exercise.     For additional lab test information, [email or web address] is an excellent reference.     Your test results are enclosed.      Please contact me if you have any questions.    In addition, here is a list of due or overdue Health Maintenance reminders.    Health Maintenance Due   Topic Date Due     Colon Cancer Screening - FIT Test - yearly  02/27/2018     Please call us at [phone] (or use [employer]) to address the above recommendations.            Thank you very much for trusting [facility]..     Healthy regards,        Reza Lynn M.D.      Results for orders placed or performed in visit on 02/23/18   Comprehensive metabolic panel   Result Value Ref Range    Sodium 141 133 - 144 mmol/L    Potassium 4.0 3.4 - 5.3 mmol/L    Chloride 107 94 - 109 mmol/L    Carbon Dioxide 25 20 - 32 mmol/L    Anion Gap 9 3 - 14 mmol/L    Glucose 89 70 - 99 mg/dL    Urea Nitrogen 19 7 - 30 mg/dL    Creatinine 1.11 0.66 - 1.25 mg/dL    GFR Estimate 68 >60 mL/min/1.7m2    GFR Estimate If Black 82 >60 mL/min/1.7m2    Calcium 8.5 8.5 - 10.1 mg/dL    Bilirubin Total 1.9 (H) 0.2 - 1.3 mg/dL    Albumin 4.1 3.4 - 5.0 g/dL    Protein Total 7.2 6.8 - 8.8 g/dL    Alkaline Phosphatase 61 40 - 150 U/L    ALT 25 0 - 70 U/L    AST 22 0 - 45 U/L   Lipid panel reflex to direct LDL Fasting   Result Value Ref Range    Cholesterol 168 <200 mg/dL    Triglycerides 113 <150 mg/dL    HDL Cholesterol 47 >39 mg/dL    LDL Cholesterol Calculated 98 <100 mg/dL    Non HDL Cholesterol 121 <130 mg/dL   CK total   Result Value Ref Range    CK Total 111  30 - 300 U/L   CBC with platelets   Result Value Ref Range    WBC 6.3 4.0 - 11.0 10e9/L    RBC Count 4.81 4.4 - 5.9 10e12/L    Hemoglobin 15.0 13.3 - 17.7 g/dL    Hematocrit 45.3 40.0 - 53.0 %    MCV 94 78 - 100 fl    MCH 31.2 26.5 - 33.0 pg    MCHC 33.1 31.5 - 36.5 g/dL    RDW 12.6 10.0 - 15.0 %    Platelet Count 181 150 - 450 10e9/L   TSH with free T4 reflex   Result Value Ref Range    TSH 3.41 0.40 - 4.00 mU/L   Albumin Random Urine Quantitative with Creat Ratio   Result Value Ref Range    Creatinine Urine 160 mg/dL    Albumin Urine mg/L <5 mg/L    Albumin Urine mg/g Cr Unable to calculate due to low value 0 - 17 mg/g Cr   T4 free   Result Value Ref Range    T4 Free 0.96 0.76 - 1.46 ng/dL   Prostate spec antigen screen   Result Value Ref Range    PSA 0.57 0 - 4 ug/L   Thyroid peroxidase antibody   Result Value Ref Range    Thyroid Peroxidase Antibody <10 <35 IU/mL

## 2018-02-23 NOTE — PATIENT INSTRUCTIONS
[facility]                        To reach your care team during and after hours:   [phone]  To reach our pharmacy:        [phone]    Clinic Hours                        Our clinic hours are:    Monday   7:30 am to 7:00 pm                  Tuesday through Friday 7:30 am to 5:00 pm                             Saturday   8:00 am to 12:00 pm      Sunday   Closed      Pharmacy Hours                        Our pharmacy hours are:    Monday   8:30 am to 7:00 pm       Tuesday to Friday  8:30 am to 6:00 pm                       Saturday    9:00 am to 1:00 pm              Sunday    Closed              There is also information available at our web site:  [email or web address]    If your provider ordered any lab tests and you do not receive the results within 10 business days, please call the clinic.    If you need a medication refill please contact your pharmacy.  Please allow 2-3 business days for your refill to be completed.    Our clinic offers telephone visits and e visits.  Please ask one of your team members to explain more.      Use [employer] (secure email communication and access to your chart) to send your primary care provider a message or make an appointment. Ask someone on your Team how to sign up for [employer].  Immunizations                      Immunization History   Administered Date(s) Administered     Influenza (IIV3) PF 11/01/2014     TD (ADULT, 7+) 11/18/1996     TDAP Vaccine (Adacel) 02/23/2007     Tdap (Adacel,Boostrix) 09/26/2015     Twinrix A/B 12/31/2013, 02/06/2014, 07/05/2014        Health Maintenance                         Health Maintenance Due   Topic Date Due     Discuss Advance Directive Planning  12/05/2016     Flu Vaccine - yearly  09/01/2017     Thyroid Function Lab (TSH) - yearly  02/21/2018     Cholesterol Lab - yearly  02/21/2018     Prostate Test (PSA) - yearly  02/21/2018     Wellness Visit with your Primary Provider - yearly  02/21/2018     Colon Cancer  Screening - FIT Test - yearly  02/27/2018

## 2018-02-23 NOTE — PROGRESS NOTES
SUBJECTIVE:   CC: Tavo Johnson is an 57 year old male who presents for preventative health visit.     Physical   Annual:     Getting at least 3 servings of Calcium per day::  Yes    Bi-annual eye exam::  Yes    Dental care twice a year::  Yes    Sleep apnea or symptoms of sleep apnea::  Daytime drowsiness    Diet::  Regular (no restrictions)    Frequency of exercise::  2-3 days/week    Duration of exercise::  45-60 minutes    Taking medications regularly::  Yes    Medication side effects::  None    Additional concerns today::  YES          PVC's -- No concerns at this time.     Bowels -- Tavo has been using OTC probiotic supplements to help keep bowels regular.     Glaucoma -- Xalatan solution eyedrops, as directed. Followed by [facility].     Knee Pain -- Bilateral knees aching X many years but becoming more bothersome - no known injury recently.     Hypothyroidism -- Levothyroxine 50 mcg daily.     TSH   Date Value Ref Range Status   02/21/2017 2.33 0.40 - 4.00 mU/L Final   02/12/2016 2.00 0.40 - 4.00 mU/L Final   01/30/2015 2.46 0.40 - 4.00 mU/L Final     Comment:     Effective 7/30/2014, the reference range for this assay has changed to reflect   new instrumentation/methodology.     12/31/2013 3.79 0.4 - 5.0 mU/L Final   12/07/2012 3.56 0.4 - 5.0 mU/L Final     T4 Free   Date Value Ref Range Status   02/21/2017 1.02 0.76 - 1.46 ng/dL Final   03/05/2010 1.07 0.70 - 1.85 ng/dL Final   12/18/2009 0.86 0.70 - 1.85 ng/dL Final   09/15/2009 0.90 0.70 - 1.85 ng/dL Final     Hyperlipidemia Follow-Up    Rate your low fat/cholesterol diet?: fair    Taking statin?  Yes, no muscle aches from statin    Other lipid medications/supplements?:  none    Pravastatin 20 mg daily, Aspirin 81 mg daily.     Recent Labs   Lab Test  02/21/17   0809  02/12/16   0811  01/30/15   0815  12/31/13   0809   CHOL  168  168  162  173   HDL  41  42  44  41   LDL  103*  98  89  106   TRIG  122  138  143  128   CHOLHDLRATIO   --    --    3.7  4.2       Past/recent records reviewed and discussed for -- family hx, social hx, surgical hx, medications, immunizations, allergies.      Today's PHQ-2 Score:   PHQ-2 ( 1999 Pfizer) 2/22/2018   Q1: Little interest or pleasure in doing things 0   Q2: Feeling down, depressed or hopeless 0   PHQ-2 Score 0   Q1: Little interest or pleasure in doing things Not at all   Q2: Feeling down, depressed or hopeless Not at all   PHQ-2 Score 0     Abuse: Current or Past(Physical, Sexual or Emotional)- No  Do you feel safe in your environment - Yes    Social History   Substance Use Topics     Smoking status: Never Smoker     Smokeless tobacco: Never Used     Alcohol use 1.2 - 1.8 oz/week     2 - 3 Standard drinks or equivalent per week      Comment: 2-3 drinks per week avg     Alcohol Use 2/22/2018   If you drink alcohol, do you typically have greater than 3 drinks per day OR greater than 7 drinks per week?   No     Last PSA:   PSA   Date Value Ref Range Status   02/21/2017 0.55 0 - 4 ug/L Final     Comment:     Assay Method:  Chemiluminescence using Siemens Vista analyzer     Reviewed orders with patient. Reviewed health maintenance and updated orders accordingly - Yes    Reviewed and updated as needed this visit by clinical staff  Tobacco  Allergies  Meds  Med Hx  Surg Hx  Fam Hx  Soc Hx      Reviewed and updated as needed this visit by Provider        Health Maintenance     Colonoscopy:  5 years, due 10/19 (last 10/14)   FIT:  Yearly, due 2/18 (last 2/17)              PSA:  Yearly, due (last 2/17)   DEXA:  n/a    Health Maintenance Due   Topic Date Due     TSH Q1 YEAR  02/21/2018     LIPID MONITORING Q1 YEAR  02/21/2018     PSA Q1 YR  02/21/2018     WELLNESS VISIT Q1 YR  02/21/2018     FIT Q1 YR  02/27/2018       Current Problem List    Patient Active Problem List   Diagnosis     Seasonal allergies     Adenomatous polyp of colon     Subclinical hypothyroidism     Hyperlipidemia LDL goal <130     Advanced directives,  counseling/discussion     PVC (premature ventricular contraction)     Environmental allergies     Glaucoma       Past Medical History    Past Medical History:   Diagnosis Date     Adenomatous polyp of colon 9/09, 10/14    tubular - due 5 yrs     Environmental allergies     seasonal - spring     Glaucoma 05/2005    [facility]     Hyperlipidemia LDL goal <130     borderline     PVC (premature ventricular contraction) 12/15     Subclinical hypothyroidism 9/09       Past Surgical History    Past Surgical History:   Procedure Laterality Date     COLONOSCOPY  9/09, 10/14    adenomatous polyps - due 5 yrs     STRESS ECHO ([facility]RO)  3/07, 10/09, 12/11, 1/14    normal     SURGICAL HISTORY OF -   6/87    HERNIA - JUNIOR     SURGICAL HISTORY OF -   1996    RT SHOULDER - Dr Julien       Current Medications    Current Outpatient Prescriptions   Medication Sig Dispense Refill     pravastatin (PRAVACHOL) 20 MG tablet Take 1 tablet (20 mg) by mouth daily 90 tablet 3     levothyroxine (SYNTHROID/LEVOTHROID) 50 MCG tablet Take 1 tablet (50 mcg) by mouth daily 90 tablet 3     ASPIRIN 81 MG OR TABS ONE DAILY 100 3     XALATAN 0.005 % OP SOLN None Entered       [DISCONTINUED] pravastatin (PRAVACHOL) 20 MG tablet Take 1 tablet (20 mg) by mouth daily 90 tablet 3     [DISCONTINUED] levothyroxine (SYNTHROID/LEVOTHROID) 50 MCG tablet Take 1 tablet (50 mcg) by mouth daily 90 tablet 3       Allergies    Allergies   Allergen Reactions     Amoxicillin      rash     Azithromycin Tinnitus     ?       Immunizations    Immunization History   Administered Date(s) Administered     Influenza (IIV3) PF 11/01/2014     TD (ADULT, 7+) 11/18/1996     TDAP Vaccine (Adacel) 02/23/2007     Tdap (Adacel,Boostrix) 09/26/2015     Twinrix A/B 12/31/2013, 02/06/2014, 07/05/2014       Family History    Family History   Problem Relation Age of Onset     C.A.D. Father 61     age 61     Thyroid Disease Father      C.A.D. Brother 42     age 42     No Known  "Problems Mother      No Known Problems Sister      No Known Problems Sister      CANCER Paternal Grandfather      stomach ca     CANCER Paternal Grandmother      stomach ca     Pancreatic Cancer Other      1st cousin     Depression Brother      MENTAL ILLNESS Brother      No Known Problems Maternal Grandmother      No Known Problems Maternal Grandfather        Social History    Social History     Social History     Marital status:      Spouse name: Maribell     Number of children: 3     Years of education: 16     Occupational History      [employer]     Social History Main Topics     Smoking status: Never Smoker     Smokeless tobacco: Never Used     Alcohol use 1.2 - 1.8 oz/week     2 - 3 Standard drinks or equivalent per week      Comment: 2-3 drinks per week avg     Drug use: No     Sexual activity: Yes     Partners: Female     Birth control/ protection: Male Surgical     Other Topics Concern      Service No     Blood Transfusions No     Caffeine Concern Yes     1-2 cups qd     Occupational Exposure No     Hobby Hazards No     Sleep Concern No     Stress Concern No     Weight Concern No     Exercise Yes     lifts and treadmill - Wii Fit - snap fitness - 2-3/week     Seat Belt Yes     Parent/Sibling W/ Cabg, Mi Or Angioplasty Before 65f 55m? Yes     Social History Narrative       Review of Systems  Constitutional, HEENT, cardiovascular, pulmonary, GI, , musculoskeletal, neuro, skin, endocrine and psych systems are negative, except as in HPI or otherwise noted     This document serves as a record of the services and decisions personally performed and made by Reza Lynn MD FAAFP. It was created on their behalf by Mark Bernabe, a trained medical scribe. The creation of this document is based the provider's statements to the medical scribe.  Mark Bernabe February 23, 2018 8:29 AM      OBJECTIVE:   /72  Pulse 85  Temp 97.6  F (36.4  C) (Oral)  Ht 5' 9\" (1.753 m)  Wt 181 lb (82.1 kg)  SpO2 " 99%  BMI 26.73 kg/m2  Physical Exam  GENERAL: healthy, alert and no distress, overweight  HENT: ear canals and TM's normal upon viewing with otoscope, nose and mouth without ulcers or lesions upon viewing with otoscope  NECK: no adenopathy, no asymmetry, masses, or scars and thyroid normal to palpation  RESP: lungs clear to auscultation - no rales, rhonchi or wheezes  CV: regular rate and rhythm, normal S1 S2, no S3 or S4, no murmur, click or rub, no peripheral edema and peripheral pulses strong - no carotid bruits  ABDOMEN: soft, nontender, no hepatosplenomegaly, no masses and bowel sounds normal   (male): testicles normal without atrophy or masses, no hernias and penis normal without urethral discharge  RECTAL: normal sphincter tone, no rectal masses and prostate of normal size for age, smooth, nontender without masses/nodules  MS: no gross musculoskeletal defects noted, no edema  SKIN: no suspicious lesions or rashes to visible skin  NEURO: mentation intact and speech normal  PSYCH: mentation appears normal, affect normal/bright    ASSESSMENT/PLAN:       ICD-10-CM    1. Encounter for routine adult health examination without abnormal findings Z00.00 Comprehensive metabolic panel     Lipid panel reflex to direct LDL Fasting     CK total     CBC with platelets     TSH with free T4 reflex     Albumin Random Urine Quantitative with Creat Ratio     T4 free     Fecal colorectal cancer screen (FIT)     Prostate spec antigen screen   2. PVC (premature ventricular contraction) I49.3 Comprehensive metabolic panel     TSH with free T4 reflex   3. Hyperlipidemia LDL goal <130 E78.5 Comprehensive metabolic panel     Lipid panel reflex to direct LDL Fasting     CK total     pravastatin (PRAVACHOL) 20 MG tablet   4. Subclinical hypothyroidism E03.9 TSH with free T4 reflex     T4 free     Thyroid peroxidase antibody     levothyroxine (SYNTHROID/LEVOTHROID) 50 MCG tablet   5. Environmental allergies Z91.09    6. Glaucoma of  "both eyes, unspecified glaucoma type H40.9    7. Adenomatous polyp of colon, unspecified part of colon D12.6 Fecal colorectal cancer screen (FIT)   8. Screen for colon cancer Z12.11 Fecal colorectal cancer screen (FIT)   9. Screening for prostate cancer Z12.5 Prostate spec antigen screen   10. Medication monitoring encounter Z51.81 Comprehensive metabolic panel     Lipid panel reflex to direct LDL Fasting     CK total     CBC with platelets     TSH with free T4 reflex     Albumin Random Urine Quantitative with Creat Ratio     T4 free     Discussed treatment/modality options, including risk and benefits, he desires advised alcohol consumption 1oz per day or less, advised aspirin 81 mg po daily, advised 1 multivitamin per day, advised calcium 4066-6466 mg/d and Vitamin D 800-1200 IU/d, advised dentist every 6 months, advised diet and exercise, advised opthalmologist every 1-2 years, advised self testicular exam q month, further diagnostic(s), further health care maintenance, further lab(s), medication refill(s), OTC meds, and observation. All diagnosis above reviewed and noted above, otherwise stable.  See [employer] orders for further details.  Follow up in 1/2 to 1 year(s) and as needed.    - Pt did not receive influenza vaccination this season.     Health Maintenance Due   Topic Date Due     TSH Q1 YEAR  02/21/2018     LIPID MONITORING Q1 YEAR  02/21/2018     PSA Q1 YR  02/21/2018     WELLNESS VISIT Q1 YR  02/21/2018     FIT Q1 YR  02/27/2018     COUNSELING:   Reviewed preventive health counseling, as reflected in patient instructions     reports that he has never smoked. He has never used smokeless tobacco.    Estimated body mass index is 26.73 kg/(m^2) as calculated from the following:    Height as of this encounter: 5' 9\" (1.753 m).    Weight as of this encounter: 181 lb (82.1 kg).   Weight management plan: Discussed healthy diet and exercise guidelines and patient will follow up in 12 months in clinic to " re-evaluate.    Counseling Resources:  ATP IV Guidelines  Pooled Cohorts Equation Calculator  FRAX Risk Assessment  ICSI Preventive Guidelines  Dietary Guidelines for Americans, 2010  USDA's MyPlate  ASA Prophylaxis  Lung CA Screening    The information in this document, created by the medical scribe for me, accurately reflects the services I personally performed and the decisions made by me. I have reviewed and approved this document for accuracy.   Reza Lynn MD FAAFP            Reza Lynn MD, FAAFP    [address]  [address] [phone] Fax

## 2018-02-23 NOTE — NURSING NOTE
"Chief Complaint   Patient presents with     Physical       Initial /72  Pulse 85  Temp 97.6  F (36.4  C) (Oral)  Ht 5' 9\" (1.753 m)  Wt 181 lb (82.1 kg)  SpO2 99%  BMI 26.73 kg/m2 Estimated body mass index is 26.73 kg/(m^2) as calculated from the following:    Height as of this encounter: 5' 9\" (1.753 m).    Weight as of this encounter: 181 lb (82.1 kg)..  BP completed using cuff size: roderick Dozier MA  "

## 2018-02-23 NOTE — MR AVS SNAPSHOT
After Visit Summary   2/23/2018    Tavo Johnson    MRN: [id number]           Patient Information     Date Of Birth          1960        Visit Information        Provider Department      2/23/2018 8:00 AM Reza Lynn MD [facility]        Today's Diagnoses     Encounter for routine adult health examination without abnormal findings    -  1    PVC (premature ventricular contraction)        Hyperlipidemia LDL goal <130        Subclinical hypothyroidism        Environmental allergies        Glaucoma of both eyes, unspecified glaucoma type        Adenomatous polyp of colon, unspecified part of colon        Screen for colon cancer        Screening for prostate cancer        Medication monitoring encounter          Care Instructions        [facility]                        To reach your care team during and after hours:   [phone]  To reach our pharmacy:        [phone]    Clinic Hours                        Our clinic hours are:    Monday   7:30 am to 7:00 pm                  Tuesday through Friday 7:30 am to 5:00 pm                             Saturday   8:00 am to 12:00 pm      Sunday   Closed      Pharmacy Hours                        Our pharmacy hours are:    Monday   8:30 am to 7:00 pm       Tuesday to Friday  8:30 am to 6:00 pm                       Saturday    9:00 am to 1:00 pm              Sunday    Closed              There is also information available at our web site:  [email or web address]    If your provider ordered any lab tests and you do not receive the results within 10 business days, please call the clinic.    If you need a medication refill please contact your pharmacy.  Please allow 2-3 business days for your refill to be completed.    Our clinic offers telephone visits and e visits.  Please ask one of your team members to explain more.      Use [employer] (secure email communication and access to your chart) to send your primary care provider  a message or make an appointment. Ask someone on your Team how to sign up for [employer].  Immunizations                      Immunization History   Administered Date(s) Administered     Influenza (IIV3) PF 11/01/2014     TD (ADULT, 7+) 11/18/1996     TDAP Vaccine (Adacel) 02/23/2007     Tdap (Adacel,Boostrix) 09/26/2015     Twinrix A/B 12/31/2013, 02/06/2014, 07/05/2014        Health Maintenance                         Health Maintenance Due   Topic Date Due     Discuss Advance Directive Planning  12/05/2016     Flu Vaccine - yearly  09/01/2017     Thyroid Function Lab (TSH) - yearly  02/21/2018     Cholesterol Lab - yearly  02/21/2018     Prostate Test (PSA) - yearly  02/21/2018     Wellness Visit with your Primary Provider - yearly  02/21/2018     Colon Cancer Screening - FIT Test - yearly  02/27/2018               Follow-ups after your visit        Future tests that were ordered for you today     Open Future Orders        Priority Expected Expires Ordered    Fecal colorectal cancer screen (FIT) Routine 3/16/2018 5/18/2018 2/23/2018            Who to contact     If you have questions or need follow up information about today's clinic visit or your schedule please contact [facility] directly at [phone].  Normal or non-critical lab and imaging results will be communicated to you by [employer], letter or phone within 4 business days after the clinic has received the results. If you do not hear from us within 7 days, please contact the clinic through [employer] or phone. If you have a critical or abnormal lab result, we will notify you by phone as soon as possible.  Submit refill requests through [employer] or call your pharmacy and they will forward the refill request to us. Please allow 3 business days for your refill to be completed.          Additional Information About Your Visit        [email or web address] Information     [employer] gives you secure access to your electronic health record. If you see a primary care  "provider, you can also send messages to your care team and make appointments. If you have questions, please call your primary care clinic.  If you do not have a primary care provider, please call [phone] and they will assist you.        Care EveryWhere ID     This is your Care EveryWhere ID. This could be used by other organizations to access your [facility] medical records  [id number]        Your Vitals Were     Pulse Temperature Height Pulse Oximetry BMI (Body Mass Index)       85 97.6  F (36.4  C) (Oral) 5' 9\" (1.753 m) 99% 26.73 kg/m2        Blood Pressure from Last 3 Encounters:   02/23/18 102/72   02/21/17 108/68   02/12/16 118/80    Weight from Last 3 Encounters:   02/23/18 181 lb (82.1 kg)   02/21/17 182 lb (82.6 kg)   02/12/16 183 lb 2 oz (83.1 kg)              We Performed the Following     Albumin Random Urine Quantitative with Creat Ratio     CBC with platelets     CK total     Comprehensive metabolic panel     Lipid panel reflex to direct LDL Fasting     Prostate spec antigen screen     T4 free     Thyroid peroxidase antibody     TSH with free T4 reflex          Where to get your medicines      These medications were sent to [facility] [address] [address]     Phone:  [phone]     levothyroxine 50 MCG tablet    pravastatin 20 MG tablet          Primary Care Provider Office Phone # Fax #    Reza Lynn [phone]       [address] [place] [address]        Equal Access to Services     EFRA FORBES AH: Hadii aad ku hadasho Soomaali, waaxda luqadaha, qaybta kaalmada adeegyada, oj hung. So [facility] [phone].    ATENCIÓN: Si habla español, tiene a felipe disposición servicios gratuitos de asistencia lingüística. Llame al [phone].    We comply with applicable federal civil rights laws and [place] laws. We do not discriminate on the basis of race, color, " national origin, age, disability, sex, sexual orientation, or gender identity.            Thank you!     Thank you for choosing [facility]  for your care. Our goal is always to provide you with excellent care. Hearing back from our patients is one way we can continue to improve our services. Please take a few minutes to complete the written survey that you may receive in the mail after your visit with us. Thank you!             Your Updated Medication List - Protect others around you: Learn how to safely use, store and throw away your medicines at www.disposemymeds.org.          This list is accurate as of 2/23/18  8:43 AM.  Always use your most recent med list.                   Brand Name Dispense Instructions for use Diagnosis    aspirin 81 MG tablet     100    ONE DAILY    Other and unspecified hyperlipidemia       levothyroxine 50 MCG tablet    SYNTHROID/LEVOTHROID    90 tablet    Take 1 tablet (50 mcg) by mouth daily    Subclinical hypothyroidism       pravastatin 20 MG tablet    PRAVACHOL    90 tablet    Take 1 tablet (20 mg) by mouth daily    Hyperlipidemia LDL goal <130       XALATAN 0.005 % ophthalmic solution   Generic drug:  latanoprost      None Entered

## 2018-02-24 LAB — PSA SERPL-ACNC: 0.57 UG/L (ref 0–4)

## 2018-02-25 LAB
ALBUMIN SERPL-MCNC: 4.1 G/DL (ref 3.4–5)
ALP SERPL-CCNC: 61 U/L (ref 40–150)
ALT SERPL W P-5'-P-CCNC: 25 U/L (ref 0–70)
ANION GAP SERPL CALCULATED.3IONS-SCNC: 9 MMOL/L (ref 3–14)
AST SERPL W P-5'-P-CCNC: 22 U/L (ref 0–45)
BILIRUB SERPL-MCNC: 1.9 MG/DL (ref 0.2–1.3)
BUN SERPL-MCNC: 19 MG/DL (ref 7–30)
CALCIUM SERPL-MCNC: 8.5 MG/DL (ref 8.5–10.1)
CHLORIDE SERPL-SCNC: 107 MMOL/L (ref 94–109)
CHOLEST SERPL-MCNC: 168 MG/DL
CK SERPL-CCNC: 111 U/L (ref 30–300)
CO2 SERPL-SCNC: 25 MMOL/L (ref 20–32)
CREAT SERPL-MCNC: 1.11 MG/DL (ref 0.66–1.25)
GFR SERPL CREATININE-BSD FRML MDRD: 68 ML/MIN/1.7M2
GLUCOSE SERPL-MCNC: 89 MG/DL (ref 70–99)
HDLC SERPL-MCNC: 47 MG/DL
LDLC SERPL CALC-MCNC: 98 MG/DL
NONHDLC SERPL-MCNC: 121 MG/DL
POTASSIUM SERPL-SCNC: 4 MMOL/L (ref 3.4–5.3)
PROT SERPL-MCNC: 7.2 G/DL (ref 6.8–8.8)
SODIUM SERPL-SCNC: 141 MMOL/L (ref 133–144)
T4 FREE SERPL-MCNC: 0.96 NG/DL (ref 0.76–1.46)
TRIGL SERPL-MCNC: 113 MG/DL
TSH SERPL DL<=0.005 MIU/L-ACNC: 3.41 MU/L (ref 0.4–4)

## 2018-02-26 LAB
CREAT UR-MCNC: 160 MG/DL
MICROALBUMIN UR-MCNC: <5 MG/L
MICROALBUMIN/CREAT UR: NORMAL MG/G CR (ref 0–17)

## 2018-02-27 LAB — THYROPEROXIDASE AB SERPL-ACNC: <10 IU/ML

## 2018-08-23 ENCOUNTER — OFFICE VISIT (OUTPATIENT)
Dept: FAMILY MEDICINE | Facility: CLINIC | Age: 58
End: 2018-08-23
Payer: COMMERCIAL

## 2018-08-23 VITALS
BODY MASS INDEX: 27.43 KG/M2 | WEIGHT: 185.2 LBS | DIASTOLIC BLOOD PRESSURE: 84 MMHG | SYSTOLIC BLOOD PRESSURE: 135 MMHG | TEMPERATURE: 98.2 F | HEART RATE: 84 BPM | OXYGEN SATURATION: 99 % | HEIGHT: 69 IN

## 2018-08-23 DIAGNOSIS — R07.0 THROAT PAIN: Primary | ICD-10-CM

## 2018-08-23 DIAGNOSIS — H69.92 DYSFUNCTION OF LEFT EUSTACHIAN TUBE: ICD-10-CM

## 2018-08-23 LAB
DEPRECATED S PYO AG THROAT QL EIA: NORMAL
SPECIMEN SOURCE: NORMAL

## 2018-08-23 PROCEDURE — 87081 CULTURE SCREEN ONLY: CPT | Performed by: PHYSICIAN ASSISTANT

## 2018-08-23 PROCEDURE — 99213 OFFICE O/P EST LOW 20 MIN: CPT | Performed by: PHYSICIAN ASSISTANT

## 2018-08-23 PROCEDURE — 87880 STREP A ASSAY W/OPTIC: CPT | Performed by: PHYSICIAN ASSISTANT

## 2018-08-23 RX ORDER — FLUTICASONE PROPIONATE 50 MCG
2 SPRAY, SUSPENSION (ML) NASAL DAILY
Qty: 1 BOTTLE | Refills: 0 | Status: SHIPPED | OUTPATIENT
Start: 2018-08-23 | End: 2019-02-02

## 2018-08-23 RX ORDER — GUAIFENESIN, PSEUDOEPHEDRINE HYDROCHLORIDE 600; 60 MG/1; MG/1
1 TABLET, EXTENDED RELEASE ORAL EVERY 12 HOURS
Qty: 20 TABLET | Refills: 0 | Status: SHIPPED | OUTPATIENT
Start: 2018-08-23 | End: 2020-03-05

## 2018-08-23 NOTE — MR AVS SNAPSHOT
After Visit Summary   8/23/2018    Tavo Johnson    MRN: [id number]           Patient Information     Date Of Birth          1960        Visit Information        Provider Department      8/23/2018 3:00 PM Kathya Gandhi PA-C [facility]        Today's Diagnoses     Throat pain    -  1    Dysfunction of left eustachian tube          Care Instructions      Earache, No Infection (Adult)  Earaches can happen without an infection. This occurs when air and fluid build up behind the eardrum causing a feeling of fullness and discomfort and reduced hearing. This is called otitis media with effusion (OME) or serous otitis media. It means there is fluid in the middle ear. It is not the same as acute otitis media, which is typically from infection.  OME can happen when you have a cold if congestion blocks the passage that drains the middle ear. This passage is called the eustachian tube. OME may also occur with nasal allergies or after a bacterial middle ear infection.    The pain or discomfort may come and go. You may hear clicking or popping sounds when you chew or swallow. You may feel that your balance is off. Or you may hear ringing in the ear.  It often takes from several weeks up to 3 months for the fluid to clear on its own. Oral pain relievers and ear drops help if there is pain. Decongestants and antihistamines sometimes help. Antibiotics don't help since there is no infection. Your doctor may prescribe a nasal spray to help reduce swelling in the nose and eustachian tube. This can allow the ear to drain.  If your OME doesn't improve after 3 months, surgery may be used to drain the fluid and insert a small tube in the eardrum to allow continued drainage.  Because the middle ear fluid can become infected, it is important to watch for signs of an ear infection which may develop later. These signs include increased ear pain, fever, or drainage from the ear.  Home care  The  following guidelines will help you care for yourself at home:    You may use over-the-counter medicine as directed to control pain, unless another medicine was prescribed. If you have chronic liver or kidney disease or ever had a stomach ulcer or GI bleeding, talk with your doctor before using these medicines. Aspirin should never be used in anyone under 18 years of age who is ill with a fever. It may cause severe liver damage.    You may use over-the-counter decongestants such as phenylephrine or pseudoephedrine. But they are not always helpful. Don't use nasal spray decongestants more than 3 days. Longer use can make congestion worse. Prescription nasal sprays from your doctor don't typically have those restrictions.    Antihistamines may help if you are also having allergy symptoms.    You may use medicines such as guaifenesin to thin mucus and promote drainage.  Follow-up care  Follow up with your healthcare provider or as advised if you are not feeling better after 3 days.  When to seek medical advice  Call your healthcare provider right away if any of the following occur:    Your ear pain gets worse or does not start to improve     Fever of 100.4 F (38 C) or higher, or as directed by your healthcare provider    Fluid or blood draining from the ear    Headache or sinus pain    Stiff neck    Unusual drowsiness or confusion  Date Last Reviewed: 10/1/2016    3183-2127 [employer]. [address] [place] [address], [place] [address]. All rights reserved. This information is not intended as a substitute for professional medical care. Always follow your healthcare professional's instructions.                Follow-ups after your visit        Who to contact     If you have questions or need follow up information about today's clinic visit or your schedule please contact [facility] directly at [phone].  Normal or non-critical lab and imaging results will be communicated to you by Dianne  "letter or phone within 4 business days after the clinic has received the results. If you do not hear from us within 7 days, please contact the clinic through [employer] or phone. If you have a critical or abnormal lab result, we will notify you by phone as soon as possible.  Submit refill requests through [employer] or call your pharmacy and they will forward the refill request to us. Please allow 3 business days for your refill to be completed.          Additional Information About Your Visit        [employer] Information     [employer] gives you secure access to your electronic health record. If you see a primary care provider, you can also send messages to your care team and make appointments. If you have questions, please call your primary care clinic.  If you do not have a primary care provider, please call [phone] and they will assist you.        Care EveryWhere ID     This is your Care EveryWhere ID. This could be used by other organizations to access your [facility] medical records  [id number]        Your Vitals Were     Pulse Temperature Height Pulse Oximetry BMI (Body Mass Index)       84 98.2  F (36.8  C) (Tympanic) 5' 9\" (1.753 m) 99% 27.35 kg/m2        Blood Pressure from Last 3 Encounters:   08/23/18 135/84   02/23/18 102/72   02/21/17 108/68    Weight from Last 3 Encounters:   08/23/18 185 lb 3.2 oz (84 kg)   02/23/18 181 lb (82.1 kg)   02/21/17 182 lb (82.6 kg)              We Performed the Following     Beta strep group A culture     Strep, Rapid Screen          Today's Medication Changes          These changes are accurate as of 8/23/18  3:24 PM.  If you have any questions, ask your nurse or doctor.               Start taking these medicines.        Dose/Directions    fluticasone 50 MCG/ACT spray   Commonly known as:  FLONASE   Used for:  Dysfunction of left eustachian tube   Started by:  Kathya Gandhi PA-C        Dose:  2 spray   Spray 2 sprays into both nostrils daily   Quantity:  1 Bottle "   Refills:  0       pseudoePHEDrine-guaiFENesin  MG per 12 hr tablet   Commonly known as:  MUCINEX D   Used for:  Dysfunction of left eustachian tube   Started by:  Kathya Gandhi PA-C        Dose:  1 tablet   Take 1 tablet by mouth every 12 hours   Quantity:  20 tablet   Refills:  0            Where to get your medicines      These medications were sent to [facility] [address] [facility]  [address] [place] [address]     Phone:  [phone]     fluticasone 50 MCG/ACT spray         Some of these will need a paper prescription and others can be bought over the counter.  Ask your nurse if you have questions.     Bring a paper prescription for each of these medications     pseudoePHEDrine-guaiFENesin  MG per 12 hr tablet                Primary Care Provider Office Phone # Fax #    Reza Lynn [phone]       [address] [place] [address]        Equal Access to Services     GER FORBES AH: Hadii aad ku hadasho Soomaali, waaxda luqadaha, qaybta kaalmada adeegyada, waxay idiin haysergion ta jeff . So [facility] [phone].    ATENCIÓN: Si habla español, tiene a felipe disposición servicios gratuitos de asistencia lingüística. [facility] [phone].    We comply with applicable federal civil rights laws and [place] laws. We do not discriminate on the basis of race, color, national origin, age, disability, sex, sexual orientation, or gender identity.            Thank you!     Thank you for choosing [facility]  for your care. Our goal is always to provide you with excellent care. Hearing back from our patients is one way we can continue to improve our services. Please take a few minutes to complete the written survey that you may receive in the mail after your visit with us. Thank you!             Your Updated Medication List - Protect others around you: Learn how to safely use, store and throw  away your medicines at www.disposemymeds.org.          This list is accurate as of 8/23/18  3:24 PM.  Always use your most recent med list.                   Brand Name Dispense Instructions for use Diagnosis    aspirin 81 MG tablet     100    ONE DAILY    Other and unspecified hyperlipidemia       fluticasone 50 MCG/ACT spray    FLONASE    1 Bottle    Spray 2 sprays into both nostrils daily    Dysfunction of left eustachian tube       levothyroxine 50 MCG tablet    SYNTHROID/LEVOTHROID    90 tablet    Take 1 tablet (50 mcg) by mouth daily    Subclinical hypothyroidism       pravastatin 20 MG tablet    PRAVACHOL    90 tablet    Take 1 tablet (20 mg) by mouth daily    Hyperlipidemia LDL goal <130       pseudoePHEDrine-guaiFENesin  MG per 12 hr tablet    MUCINEX D    20 tablet    Take 1 tablet by mouth every 12 hours    Dysfunction of left eustachian tube       XALATAN 0.005 % ophthalmic solution   Generic drug:  latanoprost      None Entered

## 2018-08-23 NOTE — PROGRESS NOTES
SUBJECTIVE:   Tavo Johnson is a 58 year old male who presents to clinic today for the following health issues:    Sore throat  Tavo presents to the clinic today with a sore throat and left ear pain. Denies any right ear pain. He relates that the cough is painful due to the sore throat, so he refrains from coughing. He does not think he has been surrounded by anyone sick however he reports his wife was recently on a bus surrounded by people and unaware of their health statuses.  He has tried Ibuprofen that has been helpful for body aches but unsuccessful for pain relief for his throat. Note that he thinks he does have post nasal drip because when he tries to take ibuprofen, it gets stuck in his throat. He has not tried mucin-ex. He denies having a fever, rashes, tingling pains, sinus pressure and only sneezes on occasion.      Problem list and histories reviewed & adjusted, as indicated.  Additional history: as documented    Patient Active Problem List   Diagnosis     Seasonal allergies     Adenomatous polyp of colon     Subclinical hypothyroidism     Hyperlipidemia LDL goal <130     Advanced directives, counseling/discussion     PVC (premature ventricular contraction)     Environmental allergies     Glaucoma     Past Surgical History:   Procedure Laterality Date     COLONOSCOPY  9/09, 10/14    adenomatous polyps - due 5 yrs     STRESS ECHO (METRO)  3/07, 10/09, 12/11, 1/14    normal     SURGICAL HISTORY OF -   6/87    HERNIA - JUNIOR     SURGICAL HISTORY OF -   1996    RT SHOULDER - Dr Julien       Social History   Substance Use Topics     Smoking status: Never Smoker     Smokeless tobacco: Never Used     Alcohol use 1.2 - 1.8 oz/week     2 - 3 Standard drinks or equivalent per week      Comment: 2-3 drinks per week avg     Family History   Problem Relation Age of Onset     C.A.LUIGI Father 61     age 61     Thyroid Disease Father      MEERAASTACEY. Brother 42     age 42     No Known Problems Mother      No Known Problems  "Sister      No Known Problems Sister      Cancer Paternal Grandfather      stomach ca     Cancer Paternal Grandmother      stomach ca     Pancreatic Cancer Other      1st cousin     Depression Brother      Mental Illness Brother      No Known Problems Maternal Grandmother      No Known Problems Maternal Grandfather          Current Outpatient Prescriptions   Medication Sig Dispense Refill     ASPIRIN 81 MG OR TABS ONE DAILY 100 3     fluticasone (FLONASE) 50 MCG/ACT spray Spray 2 sprays into both nostrils daily 1 Bottle 0     levothyroxine (SYNTHROID/LEVOTHROID) 50 MCG tablet Take 1 tablet (50 mcg) by mouth daily 90 tablet 3     pravastatin (PRAVACHOL) 20 MG tablet Take 1 tablet (20 mg) by mouth daily 90 tablet 3     pseudoePHEDrine-guaiFENesin (MUCINEX D)  MG per 12 hr tablet Take 1 tablet by mouth every 12 hours 20 tablet 0     XALATAN 0.005 % OP SOLN None Entered       Allergies   Allergen Reactions     Amoxicillin      rash     Azithromycin Tinnitus     ?       Reviewed and updated as needed this visit by clinical staff  Tobacco  Allergies  Meds  Problems  Med Hx  Surg Hx  Fam Hx  Soc Hx        Reviewed and updated as needed this visit by Provider  Tobacco  Allergies  Meds  Problems  Med Hx  Surg Hx  Fam Hx  Soc Hx          ROS:  Constitutional, HEENT, cardiovascular, pulmonary, GI, , musculoskeletal, neuro, skin, endocrine and psych systems are negative, except as otherwise noted.    This document serves as a record of the services and decisions personally performed and made by SUZETTE Houston. It was created on his behalf by Chiquita Morales, a trained medical scribe. The creation of this document is based on the provider's statements to the medical scribe.  Chiquita Morales August 23, 2018 4:21 PM      OBJECTIVE:     /84 (BP Location: Right arm, Patient Position: Chair, Cuff Size: Adult Regular)  Pulse 84  Temp 98.2  F (36.8  C) (Tympanic)  Ht 5' 9\" (1.753 m)  Wt 185 lb 3.2 oz " (84 kg)  SpO2 99%  BMI 27.35 kg/m2  Body mass index is 27.35 kg/(m^2).     GENERAL: healthy, alert and no distress  HENT: Clear tense fusion on left ear, nose and mouth without ulcers or lesions  NECK: no adenopathy, no asymmetry, masses, or scars and thyroid normal to palpation  RESP: lungs clear to auscultation - no rales, rhonchi or wheezes  CV: regular rate and rhythm, normal S1 S2, no S3 or S4, no murmur, click or rub, no peripheral edema and peripheral pulses strong  MS: no gross musculoskeletal defects noted, no edema  SKIN: no suspicious lesions or rashes  NEURO: Normal strength and tone, mentation intact and speech normal  PSYCH: mentation appears normal, affect normal/bright    Diagnostic Test Results:  Results for orders placed or performed in visit on 08/23/18 (from the past 24 hour(s))   Strep, Rapid Screen   Result Value Ref Range    Specimen Description Throat     Rapid Strep A Screen       NEGATIVE: No Group A streptococcal antigen detected by immunoassay, await culture report.   Beta strep group A culture   Result Value Ref Range    Specimen Description Throat     Culture Micro No beta hemolytic Streptococcus Group A isolated        ASSESSMENT/PLAN:     Tavo was seen today for pharyngitis.    Diagnoses and all orders for this visit:    Throat pain  Rapid strep test negative. Suggested ibuprofen or tylenol for pain management.   -     Strep, Rapid Screen  -     Beta strep group A culture    Dysfunction of left eustachian tube  Start Flonase, no more than 2 sprays in both notrils daily to avoid nosebleeds. Suggested OTC Mucinex-D for post nasal drip. Recommended chewy foods such as carrots or celery to avoid obstruction of parotid glands. Advised to report back to clinic if rash appears--discussed that in shingles pain uses precedes rash.  -     fluticasone (FLONASE) 50 MCG/ACT spray; Spray 2 sprays into both nostrils daily  -     pseudoePHEDrine-guaiFENesin (MUCINEX D)  MG per 12 hr tablet;  Take 1 tablet by mouth every 12 hours    Return if symptoms worsen or fail to improve.    The information in this document, created by the medical scribe for me, accurately reflects the services I personally performed and the decisions made by me. I have reviewed and approved this document for accuracy prior to leaving the patient care area.  August 23, 2018 4:21 PM    Kathya Gandhi PA-C  [facility]

## 2018-08-23 NOTE — PATIENT INSTRUCTIONS
Earache, No Infection (Adult)  Earaches can happen without an infection. This occurs when air and fluid build up behind the eardrum causing a feeling of fullness and discomfort and reduced hearing. This is called otitis media with effusion (OME) or serous otitis media. It means there is fluid in the middle ear. It is not the same as acute otitis media, which is typically from infection.  OME can happen when you have a cold if congestion blocks the passage that drains the middle ear. This passage is called the eustachian tube. OME may also occur with nasal allergies or after a bacterial middle ear infection.    The pain or discomfort may come and go. You may hear clicking or popping sounds when you chew or swallow. You may feel that your balance is off. Or you may hear ringing in the ear.  It often takes from several weeks up to 3 months for the fluid to clear on its own. Oral pain relievers and ear drops help if there is pain. Decongestants and antihistamines sometimes help. Antibiotics don't help since there is no infection. Your doctor may prescribe a nasal spray to help reduce swelling in the nose and eustachian tube. This can allow the ear to drain.  If your OME doesn't improve after 3 months, surgery may be used to drain the fluid and insert a small tube in the eardrum to allow continued drainage.  Because the middle ear fluid can become infected, it is important to watch for signs of an ear infection which may develop later. These signs include increased ear pain, fever, or drainage from the ear.  Home care  The following guidelines will help you care for yourself at home:    You may use over-the-counter medicine as directed to control pain, unless another medicine was prescribed. If you have chronic liver or kidney disease or ever had a stomach ulcer or GI bleeding, talk with your doctor before using these medicines. Aspirin should never be used in anyone under 18 years of age who is ill with a fever. It  may cause severe liver damage.    You may use over-the-counter decongestants such as phenylephrine or pseudoephedrine. But they are not always helpful. Don't use nasal spray decongestants more than 3 days. Longer use can make congestion worse. Prescription nasal sprays from your doctor don't typically have those restrictions.    Antihistamines may help if you are also having allergy symptoms.    You may use medicines such as guaifenesin to thin mucus and promote drainage.  Follow-up care  Follow up with your healthcare provider or as advised if you are not feeling better after 3 days.  When to seek medical advice  Call your healthcare provider right away if any of the following occur:    Your ear pain gets worse or does not start to improve     Fever of 100.4 F (38 C) or higher, or as directed by your healthcare provider    Fluid or blood draining from the ear    Headache or sinus pain    Stiff neck    Unusual drowsiness or confusion  Date Last Reviewed: 10/1/2016    1680-2313 [employer]. [address] [place] [address], [place] [address]. All rights reserved. This information is not intended as a substitute for professional medical care. Always follow your healthcare professional's instructions.

## 2018-08-24 LAB
BACTERIA SPEC CULT: NORMAL
SPECIMEN SOURCE: NORMAL

## 2018-08-24 NOTE — PROGRESS NOTES
Tavo  Here are your recent results.  They are normal.  If you have any questions please do not hesitate to contact our office via phone ([phone]) or MyChart.    Kathya Gandhi MS, PA-C  [facility]

## 2019-01-16 DIAGNOSIS — Z00.00 ENCOUNTER FOR ROUTINE ADULT HEALTH EXAMINATION WITHOUT ABNORMAL FINDINGS: ICD-10-CM

## 2019-01-16 DIAGNOSIS — Z12.11 SCREEN FOR COLON CANCER: ICD-10-CM

## 2019-01-16 DIAGNOSIS — D12.6 ADENOMATOUS POLYP OF COLON, UNSPECIFIED PART OF COLON: ICD-10-CM

## 2019-01-16 PROCEDURE — 82274 ASSAY TEST FOR BLOOD FECAL: CPT | Performed by: FAMILY MEDICINE

## 2019-01-20 LAB — HEMOCCULT STL QL IA: NEGATIVE

## 2019-02-02 ENCOUNTER — OFFICE VISIT (OUTPATIENT)
Dept: FAMILY MEDICINE | Facility: CLINIC | Age: 59
End: 2019-02-02
Payer: COMMERCIAL

## 2019-02-02 VITALS
WEIGHT: 180 LBS | SYSTOLIC BLOOD PRESSURE: 128 MMHG | HEIGHT: 69 IN | DIASTOLIC BLOOD PRESSURE: 60 MMHG | HEART RATE: 97 BPM | TEMPERATURE: 97.6 F | BODY MASS INDEX: 26.66 KG/M2 | OXYGEN SATURATION: 97 %

## 2019-02-02 DIAGNOSIS — I49.3 PVC (PREMATURE VENTRICULAR CONTRACTION): ICD-10-CM

## 2019-02-02 DIAGNOSIS — E03.8 SUBCLINICAL HYPOTHYROIDISM: ICD-10-CM

## 2019-02-02 DIAGNOSIS — E78.5 HYPERLIPIDEMIA LDL GOAL <130: ICD-10-CM

## 2019-02-02 DIAGNOSIS — Z12.5 SCREENING FOR PROSTATE CANCER: ICD-10-CM

## 2019-02-02 DIAGNOSIS — J06.9 VIRAL URI WITH COUGH: Primary | ICD-10-CM

## 2019-02-02 LAB
ALBUMIN SERPL-MCNC: 3.7 G/DL (ref 3.4–5)
ALBUMIN UR-MCNC: 30 MG/DL
ALP SERPL-CCNC: 66 U/L (ref 40–150)
ALT SERPL W P-5'-P-CCNC: 27 U/L (ref 0–70)
ANION GAP SERPL CALCULATED.3IONS-SCNC: 3 MMOL/L (ref 3–14)
APPEARANCE UR: CLEAR
AST SERPL W P-5'-P-CCNC: 22 U/L (ref 0–45)
BASOPHILS # BLD AUTO: 0 10E9/L (ref 0–0.2)
BASOPHILS NFR BLD AUTO: 0.2 %
BILIRUB SERPL-MCNC: 0.8 MG/DL (ref 0.2–1.3)
BILIRUB UR QL STRIP: NEGATIVE
BUN SERPL-MCNC: 18 MG/DL (ref 7–30)
CALCIUM SERPL-MCNC: 9.2 MG/DL (ref 8.5–10.1)
CHLORIDE SERPL-SCNC: 107 MMOL/L (ref 94–109)
CHOLEST SERPL-MCNC: 129 MG/DL
CO2 SERPL-SCNC: 29 MMOL/L (ref 20–32)
COLOR UR AUTO: YELLOW
CREAT SERPL-MCNC: 1.15 MG/DL (ref 0.66–1.25)
CREAT UR-MCNC: 425 MG/DL
DIFFERENTIAL METHOD BLD: NORMAL
EOSINOPHIL # BLD AUTO: 0.1 10E9/L (ref 0–0.7)
EOSINOPHIL NFR BLD AUTO: 3.4 %
ERYTHROCYTE [DISTWIDTH] IN BLOOD BY AUTOMATED COUNT: 12.6 % (ref 10–15)
GFR SERPL CREATININE-BSD FRML MDRD: 68 ML/MIN/{1.73_M2}
GLUCOSE SERPL-MCNC: 81 MG/DL (ref 70–99)
GLUCOSE UR STRIP-MCNC: NEGATIVE MG/DL
HCT VFR BLD AUTO: 42.5 % (ref 40–53)
HDLC SERPL-MCNC: 40 MG/DL
HGB BLD-MCNC: 13.9 G/DL (ref 13.3–17.7)
HGB UR QL STRIP: NEGATIVE
KETONES UR STRIP-MCNC: NEGATIVE MG/DL
LDLC SERPL CALC-MCNC: 73 MG/DL
LEUKOCYTE ESTERASE UR QL STRIP: NEGATIVE
LYMPHOCYTES # BLD AUTO: 1.4 10E9/L (ref 0.8–5.3)
LYMPHOCYTES NFR BLD AUTO: 33.8 %
MCH RBC QN AUTO: 31 PG (ref 26.5–33)
MCHC RBC AUTO-ENTMCNC: 32.7 G/DL (ref 31.5–36.5)
MCV RBC AUTO: 95 FL (ref 78–100)
MICROALBUMIN UR-MCNC: 24 MG/L
MICROALBUMIN/CREAT UR: 5.67 MG/G CR (ref 0–17)
MONOCYTES # BLD AUTO: 0.8 10E9/L (ref 0–1.3)
MONOCYTES NFR BLD AUTO: 18.7 %
MUCOUS THREADS #/AREA URNS LPF: PRESENT /LPF
NEUTROPHILS # BLD AUTO: 1.8 10E9/L (ref 1.6–8.3)
NEUTROPHILS NFR BLD AUTO: 43.9 %
NITRATE UR QL: NEGATIVE
NONHDLC SERPL-MCNC: 89 MG/DL
PH UR STRIP: 6 PH (ref 5–7)
PLATELET # BLD AUTO: 160 10E9/L (ref 150–450)
POTASSIUM SERPL-SCNC: 4.2 MMOL/L (ref 3.4–5.3)
PROT SERPL-MCNC: 7.2 G/DL (ref 6.8–8.8)
PSA SERPL-ACNC: 0.48 UG/L (ref 0–4)
RBC # BLD AUTO: 4.49 10E12/L (ref 4.4–5.9)
RBC #/AREA URNS AUTO: ABNORMAL /HPF
SODIUM SERPL-SCNC: 139 MMOL/L (ref 133–144)
SOURCE: ABNORMAL
SP GR UR STRIP: >1.03 (ref 1–1.03)
TRIGL SERPL-MCNC: 82 MG/DL
TSH SERPL DL<=0.005 MIU/L-ACNC: 2.8 MU/L (ref 0.4–4)
UROBILINOGEN UR STRIP-ACNC: 0.2 EU/DL (ref 0.2–1)
WBC # BLD AUTO: 4.1 10E9/L (ref 4–11)
WBC #/AREA URNS AUTO: ABNORMAL /HPF

## 2019-02-02 PROCEDURE — 36415 COLL VENOUS BLD VENIPUNCTURE: CPT | Performed by: FAMILY MEDICINE

## 2019-02-02 PROCEDURE — G0103 PSA SCREENING: HCPCS | Performed by: FAMILY MEDICINE

## 2019-02-02 PROCEDURE — 81001 URINALYSIS AUTO W/SCOPE: CPT | Performed by: FAMILY MEDICINE

## 2019-02-02 PROCEDURE — 84443 ASSAY THYROID STIM HORMONE: CPT | Performed by: FAMILY MEDICINE

## 2019-02-02 PROCEDURE — 85025 COMPLETE CBC W/AUTO DIFF WBC: CPT | Performed by: FAMILY MEDICINE

## 2019-02-02 PROCEDURE — 99213 OFFICE O/P EST LOW 20 MIN: CPT | Performed by: FAMILY MEDICINE

## 2019-02-02 PROCEDURE — 80061 LIPID PANEL: CPT | Performed by: FAMILY MEDICINE

## 2019-02-02 PROCEDURE — 82043 UR ALBUMIN QUANTITATIVE: CPT | Performed by: FAMILY MEDICINE

## 2019-02-02 PROCEDURE — 80053 COMPREHEN METABOLIC PANEL: CPT | Performed by: FAMILY MEDICINE

## 2019-02-02 ASSESSMENT — MIFFLIN-ST. JEOR: SCORE: 1616.85

## 2019-02-02 NOTE — RESULT ENCOUNTER NOTE
Mr. Johnson,    -Normal red blood cell (hgb) levels, normal white blood cell count and normal platelet levels.  -Urine with a small amount of protein which I think is nothing to be concerned about.  Please discuss this with Dr. Lynn at your preventative visit.  We may simply want to repeat this at that visit.    If you have further questions about the interpretation of your labs, [email or web address] is a good website to check out for further information.    Please contact the clinic if you have additional questions.  Thank you.    Sincerely,    Alexis Hahn MD

## 2019-02-02 NOTE — PROGRESS NOTES
"  SUBJECTIVE:   Tavo Johnson is a 58 year old male who presents to clinic today for the following health issues:      Acute Illness   Acute illness concerns: URI Sx  Onset: 5 days    Fever: YES - tactile    Chills/Sweats: YES - chills    Headache (location?): YES    Sinus Pressure:no    Conjunctivitis:  no    Ear Pain: YES: bilateral    Rhinorrhea: YES    Congestion: YES    Sore Throat: no     Cough: YES-productive    Wheeze: YES    Decreased Appetite: no    Nausea: no    Vomiting: no    Diarrhea:  no    Dysuria/Freq.: no    Fatigue/Achiness: YES    Sick/Strep Exposure: YES- was on a plane last wk     Therapies Tried and outcome: mucinex d and OTC cough med, ibuprofen             Problem list and histories reviewed & adjusted, as indicated.  Additional history: as documented    BP Readings from Last 3 Encounters:   02/02/19 128/60   08/23/18 135/84   02/23/18 102/72    Wt Readings from Last 3 Encounters:   02/02/19 81.6 kg (180 lb)   08/23/18 84 kg (185 lb 3.2 oz)   02/23/18 82.1 kg (181 lb)                  Labs reviewed in Epic.  Due for labs for upcoming complete physical examination with Dr. Shane mendes this month.    Reviewed and updated as needed this visit by clinical staff       Reviewed and updated as needed this visit by Provider         ROS:  Constitutional, HEENT, cardiovascular, pulmonary, gi and gu systems are negative, except as otherwise noted.    OBJECTIVE:     /60   Pulse 97   Temp 97.6  F (36.4  C) (Oral)   Ht 1.753 m (5' 9\")   Wt 81.6 kg (180 lb)   SpO2 97%   BMI 26.58 kg/m    Body mass index is 26.58 kg/m .  GENERAL: healthy, alert and no distress  EYES: Eyes grossly normal to inspection, PERRL and conjunctivae and sclerae normal  HENT: ear canals and TM's normal, nose and mouth without ulcers or lesions  NECK: no adenopathy, no asymmetry, masses, or scars and thyroid normal to palpation  RESP: lungs clear to auscultation - no rales, rhonchi or wheezes  CV: regular rate and rhythm, " normal S1 S2, no S3 or S4, no murmur, click or rub, no peripheral edema and peripheral pulses strong  MS: no gross musculoskeletal defects noted, no edema    Diagnostic Test Results:  Results for orders placed or performed in visit on 02/02/19 (from the past 24 hour(s))   CBC with platelets differential   Result Value Ref Range    WBC 4.1 4.0 - 11.0 10e9/L    RBC Count 4.49 4.4 - 5.9 10e12/L    Hemoglobin 13.9 13.3 - 17.7 g/dL    Hematocrit 42.5 40.0 - 53.0 %    MCV 95 78 - 100 fl    MCH 31.0 26.5 - 33.0 pg    MCHC 32.7 31.5 - 36.5 g/dL    RDW 12.6 10.0 - 15.0 %    Platelet Count 160 150 - 450 10e9/L    % Neutrophils 43.9 %    % Lymphocytes 33.8 %    % Monocytes 18.7 %    % Eosinophils 3.4 %    % Basophils 0.2 %    Absolute Neutrophil 1.8 1.6 - 8.3 10e9/L    Absolute Lymphocytes 1.4 0.8 - 5.3 10e9/L    Absolute Monocytes 0.8 0.0 - 1.3 10e9/L    Absolute Eosinophils 0.1 0.0 - 0.7 10e9/L    Absolute Basophils 0.0 0.0 - 0.2 10e9/L    Diff Method Automated Method    *UA reflex to Microscopic   Result Value Ref Range    Color Urine Yellow     Appearance Urine Clear     Glucose Urine Negative NEG^Negative mg/dL    Bilirubin Urine Negative NEG^Negative    Ketones Urine Negative NEG^Negative mg/dL    Specific Gravity Urine >1.030 1.003 - 1.035    Blood Urine Negative NEG^Negative    pH Urine 6.0 5.0 - 7.0 pH    Protein Albumin Urine 30 (A) NEG^Negative mg/dL    Urobilinogen Urine 0.2 0.2 - 1.0 EU/dL    Nitrite Urine Negative NEG^Negative    Leukocyte Esterase Urine Negative NEG^Negative    Source Midstream Urine    Urine Microscopic   Result Value Ref Range    WBC Urine 0 - 5 OTO5^0 - 5 /HPF    RBC Urine O - 2 OTO2^O - 2 /HPF    Mucous Urine Present (A) NEG^Negative /LPF       ASSESSMENT/PLAN:               ICD-10-CM    1. Viral URI with cough J06.9 Urine Microscopic    B97.89    2. PVC (premature ventricular contraction) I49.3 Comprehensive metabolic panel     CBC with platelets differential     *UA reflex to Microscopic      Albumin Random Urine Quantitative with Creat Ratio   3. Hyperlipidemia LDL goal <130 E78.5 Lipid panel reflex to direct LDL Non-fasting   4. Subclinical hypothyroidism E03.9 TSH with free T4 reflex   5. Screening for prostate cancer Z12.5 PSA, screen     URI is likely viral.  Advised of OTC options for symptomatic treatment. Return to clinic in 10-14 days if not improving, sooner if worsens.     Labs obtained today for upcoming complete physical examination - Dr. Lynn to review with patient later this month.    See Patient Instructions    Alexis Hahn Jr, MD  [facility]

## 2019-02-04 NOTE — RESULT ENCOUNTER NOTE
Mr. Johnson,    -All of your labs are normal.  Dr. Lynn will review these with you at your upcoming preventative visit with him later this month.    If you have further questions about the interpretation of your labs, [email or web address] is a good website to check out for further information.    Please contact the clinic if you have additional questions.  Thank you.    Sincerely,    Alexis Hahn MD

## 2019-02-28 ENCOUNTER — OFFICE VISIT (OUTPATIENT)
Dept: FAMILY MEDICINE | Facility: CLINIC | Age: 59
End: 2019-02-28
Payer: COMMERCIAL

## 2019-02-28 VITALS
HEART RATE: 71 BPM | HEIGHT: 69 IN | TEMPERATURE: 97.9 F | DIASTOLIC BLOOD PRESSURE: 70 MMHG | OXYGEN SATURATION: 100 % | WEIGHT: 175 LBS | SYSTOLIC BLOOD PRESSURE: 122 MMHG | BODY MASS INDEX: 25.92 KG/M2

## 2019-02-28 DIAGNOSIS — Z23 NEED FOR PNEUMOCOCCAL VACCINATION: ICD-10-CM

## 2019-02-28 DIAGNOSIS — Z12.11 SCREEN FOR COLON CANCER: ICD-10-CM

## 2019-02-28 DIAGNOSIS — H61.23 BILATERAL IMPACTED CERUMEN: ICD-10-CM

## 2019-02-28 DIAGNOSIS — Z91.09 ENVIRONMENTAL ALLERGIES: ICD-10-CM

## 2019-02-28 DIAGNOSIS — Z00.00 ENCOUNTER FOR ROUTINE ADULT HEALTH EXAMINATION WITHOUT ABNORMAL FINDINGS: Primary | ICD-10-CM

## 2019-02-28 DIAGNOSIS — H40.9 GLAUCOMA OF BOTH EYES, UNSPECIFIED GLAUCOMA TYPE: ICD-10-CM

## 2019-02-28 DIAGNOSIS — E78.5 HYPERLIPIDEMIA LDL GOAL <130: ICD-10-CM

## 2019-02-28 DIAGNOSIS — Z12.5 SCREENING FOR PROSTATE CANCER: ICD-10-CM

## 2019-02-28 DIAGNOSIS — E03.8 SUBCLINICAL HYPOTHYROIDISM: ICD-10-CM

## 2019-02-28 DIAGNOSIS — D12.6 ADENOMATOUS POLYP OF COLON, UNSPECIFIED PART OF COLON: ICD-10-CM

## 2019-02-28 DIAGNOSIS — Z51.81 MEDICATION MONITORING ENCOUNTER: ICD-10-CM

## 2019-02-28 PROCEDURE — 99396 PREV VISIT EST AGE 40-64: CPT | Mod: 25 | Performed by: FAMILY MEDICINE

## 2019-02-28 PROCEDURE — 90732 PPSV23 VACC 2 YRS+ SUBQ/IM: CPT | Performed by: FAMILY MEDICINE

## 2019-02-28 PROCEDURE — 90471 IMMUNIZATION ADMIN: CPT | Performed by: FAMILY MEDICINE

## 2019-02-28 RX ORDER — PRAVASTATIN SODIUM 20 MG
20 TABLET ORAL DAILY
Qty: 90 TABLET | Refills: 3 | Status: SHIPPED | OUTPATIENT
Start: 2019-02-28 | End: 2020-03-05

## 2019-02-28 RX ORDER — LEVOTHYROXINE SODIUM 50 UG/1
50 TABLET ORAL DAILY
Qty: 90 TABLET | Refills: 3 | Status: SHIPPED | OUTPATIENT
Start: 2019-02-28 | End: 2020-03-05

## 2019-02-28 ASSESSMENT — MIFFLIN-ST. JEOR: SCORE: 1594.17

## 2019-02-28 NOTE — PATIENT INSTRUCTIONS
Prescriptions refilled today.    Recommend Shingrix vaccine for shingles. Check with insurance to see where the Shingrix vaccine is the cheapest. Follow up 2-6 months after receiving the first shot for the second shot.    Received Pneumovax today.     Recommend Debrox or Cerumenex for ear wax.    [facility] [place] [facility]                        To reach your care team during and after hours:   [phone]  To reach our pharmacy:        [phone]    Clinic Hours                        Our clinic hours are:    Monday   7:30 am to 7:00 pm                  Tuesday through Friday 7:30 am to 5:00 pm                             Saturday   8:00 am to 12:00 pm      Sunday   Closed      Pharmacy Hours                        Our pharmacy hours are:    Monday   8:30 am to 7:00 pm       Tuesday to Friday  8:30 am to 6:00 pm                       Saturday    9:00 am to 1:00 pm              Sunday    Closed              There is also information available at our web site:  [email or web address]    If your provider ordered any lab tests and you do not receive the results within 10 business days, please call the clinic.    If you need a medication refill please contact your pharmacy.  Please allow 2-3 business days for your refill to be completed.    Our clinic offers telephone visits and e visits.  Please ask one of your team members to explain more.      Use [employer] (secure email communication and access to your chart) to send your primary care provider a message or make an appointment. Ask someone on your Team how to sign up for [employer].  Immunizations                      Immunization History   Administered Date(s) Administered     Influenza (IIV3) PF 11/01/2014     TD (ADULT, 7+) 11/18/1996     TDAP Vaccine (Adacel) 02/23/2007     Tdap (Adacel,Boostrix) 09/26/2015     Twinrix A/B 12/31/2013, 02/06/2014, 07/05/2014        Health Maintenance                         Health Maintenance Due   Topic Date Due     HIV SCREEN (SYSTEM  ASSIGNED)  06/17/1976     Zoster (Shingles) Vaccine (1 of 2) 06/17/2008     Flu Vaccine (1) 09/01/2018     Preventive Care Visit  02/23/2019       Preventive Health Recommendations  Male Ages 50 - 64    Yearly exam:             See your health care provider every year in order to  o   Review health changes.   o   Discuss preventive care.    o   Review your medicines if your doctor has prescribed any.     Have a cholesterol test every 5 years, or more frequently if you are at risk for high cholesterol/heart disease.     Have a diabetes test (fasting glucose) every three years. If you are at risk for diabetes, you should have this test more often.     Have a colonoscopy at age 50, or have a yearly FIT test (stool test). These exams will check for colon cancer.      Talk with your health care provider about whether or not a prostate cancer screening test (PSA) is right for you.    You should be tested each year for STDs (sexually transmitted diseases), if you re at risk.     Shots: Get a flu shot each year. Get a tetanus shot every 10 years.     Nutrition:    Eat at least 5 servings of fruits and vegetables daily.     Eat whole-grain bread, whole-wheat pasta and brown rice instead of white grains and rice.     Get adequate Calcium and Vitamin D.     Lifestyle    Exercise for at least 150 minutes a week (30 minutes a day, 5 days a week). This will help you control your weight and prevent disease.     Limit alcohol to one drink per day.     No smoking.     Wear sunscreen to prevent skin cancer.     See your dentist every six months for an exam and cleaning.     See your eye doctor every 1 to 2 years.

## 2019-02-28 NOTE — PROGRESS NOTES
SUBJECTIVE:   CC: Tavo Johnson is an 58 year old male who presents for preventative health visit.     Physical   Annual:     Getting at least 3 servings of Calcium per day:  Yes    Bi-annual eye exam:  Yes    Dental care twice a year:  Yes    Sleep apnea or symptoms of sleep apnea:  Daytime drowsiness    Diet:  Regular (no restrictions)    Frequency of exercise:  2-3 days/week    Duration of exercise:  45-60 minutes    Taking medications regularly:  Yes    Medication side effects:  None, No muscle aches and No significant flushing    Additional concerns today:  No    PHQ-2 Total Score: 0    Previous labs reviewed with patient. Labs were quite good.    Hyperlipidemia: Patient's hyperlipidemia is well controlled. Patient is currently prescribed 20 mg Pravastatin daily for hyperlipidemia management.    Recent Labs   Lab Test 02/02/19  1057 02/23/18  0801  01/30/15  0815 12/31/13  0809   CHOL 129 168   < > 162 173   HDL 40 47   < > 44 41   LDL 73 98   < > 89 106   TRIG 82 113   < > 143 128   CHOLHDLRATIO  --   --   --  3.7 4.2    < > = values in this interval not displayed.     Hypothyroidism: Patient's hypothyroidism is well controlled. Patient is currently prescribed 50 mcg Levothyroxine daily for hypothyroidism management.     TSH   Date Value Ref Range Status   02/02/2019 2.80 0.40 - 4.00 mU/L Final     T4 Free   Date Value Ref Range Status   02/23/2018 0.96 0.76 - 1.46 ng/dL Final     Seasonal/Environmental Allergies: Stable. No issues.    Today's PHQ-2 Score:   PHQ-2 ( 1999 Pfizer) 2/28/2019   Q1: Little interest or pleasure in doing things 0   Q2: Feeling down, depressed or hopeless 0   PHQ-2 Score 0   Q1: Little interest or pleasure in doing things -   Q2: Feeling down, depressed or hopeless -   PHQ-2 Score -       Abuse: Current or Past(Physical, Sexual or Emotional)- NO  Do you feel safe in your environment? YES    Social History     Tobacco Use     Smoking status: Never Smoker     Smokeless tobacco: Never  Used   Substance Use Topics     Alcohol use: Yes     Alcohol/week: 1.2 - 1.8 oz     Types: 2 - 3 Standard drinks or equivalent per week     Comment: 2-3 drinks per week avg     Alcohol Use 2/27/2019   If you drink alcohol do you typically have greater than 3 drinks per day OR greater than 7 drinks per week? No       Last PSA:   PSA   Date Value Ref Range Status   02/02/2019 0.48 0 - 4 ug/L Final     Comment:     Assay Method:  Chemiluminescence using Siemens Vista analyzer       Reviewed orders with patient. Reviewed health maintenance and updated orders accordingly - Yes  Labs reviewed in EPIC    Reviewed and updated as needed this visit by clinical staff  Tobacco  Allergies  Meds  Soc Hx      Reviewed and updated as needed this visit by Provider  Allergies        Health Maintenance     Colonoscopy:  Last 10/6/57261, Due 10/2019   FIT:  Last 1/16/2019, Due 1/2020              PSA:  Last 2/2/2019, Due 2/2020   DEXA:  N/A    Health Maintenance Due   Topic Date Due     HIV SCREEN (SYSTEM ASSIGNED)  06/17/1976     ZOSTER IMMUNIZATION (1 of 2) 06/17/2008     INFLUENZA VACCINE (1) 09/01/2018     PREVENTIVE CARE VISIT  02/23/2019       Current Problem List    Patient Active Problem List   Diagnosis     Seasonal allergies     Adenomatous polyp of colon     Subclinical hypothyroidism     Hyperlipidemia LDL goal <130     Advanced directives, counseling/discussion     PVC (premature ventricular contraction)     Environmental allergies     Glaucoma       Past Medical History    Past Medical History:   Diagnosis Date     Adenomatous polyp of colon 9/09, 10/14    tubular - due 5 yrs     Environmental allergies     seasonal - spring     Glaucoma 05/2005    [facility] - bilateral     Hyperlipidemia LDL goal <130     borderline     PVC (premature ventricular contraction) 12/15     Subclinical hypothyroidism 9/09       Past Surgical History    Past Surgical History:   Procedure Laterality Date     COLONOSCOPY  9/09, 10/14     adenomatous polyps - due 5 yrs     STRESS ECHO ([facility]RO)  3/07, 10/09, 12/11, 1/14    normal     SURGICAL HISTORY OF -   6/87    HERNIA - JUNIOR     SURGICAL HISTORY OF -   1996    RT SHOULDER - Dr Julien       Current Medications    Current Outpatient Medications   Medication Sig Dispense Refill     ASPIRIN 81 MG OR TABS ONE DAILY 100 3     levothyroxine (SYNTHROID/LEVOTHROID) 50 MCG tablet Take 1 tablet (50 mcg) by mouth daily 90 tablet 3     pravastatin (PRAVACHOL) 20 MG tablet Take 1 tablet (20 mg) by mouth daily 90 tablet 3     pseudoePHEDrine-guaiFENesin (MUCINEX D)  MG per 12 hr tablet Take 1 tablet by mouth every 12 hours 20 tablet 0     XALATAN 0.005 % OP SOLN None Entered         Allergies    Allergies   Allergen Reactions     Amoxicillin      rash     Azithromycin Tinnitus     ?       Immunizations    Immunization History   Administered Date(s) Administered     Influenza (IIV3) PF 11/01/2014     Pneumococcal 23 valent 02/28/2019     TD (ADULT, 7+) 11/18/1996     TDAP Vaccine (Adacel) 02/23/2007     Tdap (Adacel,Boostrix) 09/26/2015     Twinrix A/B 12/31/2013, 02/06/2014, 07/05/2014       Family History    Family History   Problem Relation Age of Onset     C.A.D. Father 61        age 61     Thyroid Disease Father      C.A.D. Brother 42        age 42     No Known Problems Mother      No Known Problems Sister      No Known Problems Sister      Cancer Paternal Grandfather         stomach ca     Cancer Paternal Grandmother         stomach ca     Pancreatic Cancer Other         1st cousin     Depression Brother      Mental Illness Brother         anxiety     No Known Problems Maternal Grandmother      No Known Problems Maternal Grandfather        Social History    Social History     Socioeconomic History     Marital status:      Spouse name: Maribell     Number of children: 3     Years of education: 16     Highest education level: Not on file   Occupational History     Employer: [employer]  Needs     Financial resource strain: Not on file     Food insecurity:     Worry: Not on file     Inability: Not on file     Transportation needs:     Medical: Not on file     Non-medical: Not on file   Tobacco Use     Smoking status: Never Smoker     Smokeless tobacco: Never Used   Substance and Sexual Activity     Alcohol use: Yes     Alcohol/week: 1.2 - 1.8 oz     Types: 2 - 3 Standard drinks or equivalent per week     Comment: 2-3 drinks per week avg     Drug use: No     Sexual activity: Yes     Partners: Female     Birth control/protection: Male Surgical   Lifestyle     Physical activity:     Days per week: Not on file     Minutes per session: Not on file     Stress: Not on file   Relationships     Social connections:     Talks on phone: Not on file     Gets together: Not on file     Attends Hoahaoism service: Not on file     Active member of club or organization: Not on file     Attends meetings of clubs or organizations: Not on file     Relationship status: Not on file     Intimate partner violence:     Fear of current or ex partner: Not on file     Emotionally abused: Not on file     Physically abused: Not on file     Forced sexual activity: Not on file   Other Topics Concern      Service No     Blood Transfusions No     Caffeine Concern Yes     Comment: 1-2 cups qd     Occupational Exposure No     Hobby Hazards No     Sleep Concern No     Stress Concern No     Weight Concern No     Special Diet Not Asked     Back Care Not Asked     Exercise Yes     Comment: lifts and treadmill - Wii Fit - [employer] fitness - 2-3/week     Bike Helmet Not Asked     Seat Belt Yes     Self-Exams Not Asked     Parent/sibling w/ CABG, MI or angioplasty before 65F 55M? Yes   Social History Narrative     Not on file       Review of Systems  Constitutional, HEENT, cardiovascular, pulmonary, GI, , musculoskeletal, neuro, skin, endocrine and psych systems are negative, except as otherwise noted.    OBJECTIVE:   /70   Pulse  "71   Temp 97.9  F (36.6  C) (Oral)   Ht 1.753 m (5' 9\")   Wt 79.4 kg (175 lb)   SpO2 100%   BMI 25.84 kg/m      Physical Exam  GENERAL: healthy, alert and no distress  EYES: Eyes grossly normal to inspection  HENT:ear canals and TM's normal upon viewing with otoscope, nose and mouth without ulcers or lesions upon viewing with otoscope, Bilateral cerumen impaction  NECK: no adenopathy, no asymmetry, masses, or scars and thyroid normal to palpation  RESP: lungs clear to auscultation - no rales, rhonchi or wheezes  CV: regular rate and rhythm, normal S1 S2, no S3 or S4, no murmur, click or rub, no peripheral edema and peripheral pulses strong  ABDOMEN: soft, nontender, no hepatosplenomegaly, no masses and bowel sounds normal   (male): normal male genitalia without lesions or urethral discharge, no hernia  RECTAL: normal sphincter tone, no rectal masses, trace increased prostate size, smooth, nontender without nodules or masses  MS: no gross musculoskeletal defects noted, no edema  SKIN: no suspicious lesions or rashes  NEURO: Normal strength and tone, mentation intact and speech normal  PSYCH: mentation appears normal, affect normal/bright  BACK: no CVA tenderness, no paralumbar tenderness    Diagnostic Test Results:  No results found for this or any previous visit (from the past 24 hour(s)).    ASSESSMENT/PLAN:       ICD-10-CM    1. Encounter for routine adult health examination without abnormal findings Z00.00    2. Subclinical hypothyroidism E03.9 levothyroxine (SYNTHROID/LEVOTHROID) 50 MCG tablet   3. Hyperlipidemia LDL goal <130 E78.5 pravastatin (PRAVACHOL) 20 MG tablet   4. Environmental allergies Z91.09    5. Adenomatous polyp of colon, unspecified part of colon D12.6    6. Glaucoma of both eyes, unspecified glaucoma type H40.9    7. Bilateral impacted cerumen H61.23    8. Screen for colon cancer Z12.11    9. Screening for prostate cancer Z12.5    10. Medication monitoring encounter Z51.81    11. Need " "for pneumococcal vaccination Z23 PNEUMOCOCCAL VACCINE,ADULT,SQ OR IM     Discussed treatment/modality options, including risk and benefits, he desires advised aspirin 81 mg po daily, advised 1 multivitamin per day, advised dentist every 6 months, advised diet and exercise and advised opthalmologist every 1-2 years. All diagnosis above reviewed and noted above, otherwise stable.  See [facility] orders for further details.      1) Patient's hyperlipidemia is well controlled. Patient is currently prescribed 20 mg Pravastatin daily for hyperlipidemia management. Advised continued use.    2) Patient's hypothyroidism is well controlled. Patient is currently prescribed 50 mcg Levothyroxine daily for hypothyroidism management. Advised continued use.    3) Prescriptions refilled today.    4) Received Pneumovax today. Recommend Shingrix vaccine.    5) Recommend Debrox or Cerumenex use for bilateral cerumen impaction management.    6) Follow up in 1 year for annual physical exam.     Health Maintenance Due   Topic Date Due     HIV SCREEN (SYSTEM ASSIGNED)  06/17/1976     ZOSTER IMMUNIZATION (1 of 2) 06/17/2008     INFLUENZA VACCINE (1) 09/01/2018     PREVENTIVE CARE VISIT  02/23/2019       COUNSELING:   Reviewed preventive health counseling, as reflected in patient instructions    BP Readings from Last 1 Encounters:   02/28/19 122/70     Estimated body mass index is 25.84 kg/m  as calculated from the following:    Height as of this encounter: 1.753 m (5' 9\").    Weight as of this encounter: 79.4 kg (175 lb).     reports that  has never smoked. he has never used smokeless tobacco.    Counseling Resources:  ATP IV Guidelines  Pooled Cohorts Equation Calculator  FRAX Risk Assessment  ICSI Preventive Guidelines  Dietary Guidelines for Americans, 2010  USDA's MyPlate  ASA Prophylaxis  Lung CA Screening    This document serves as a record of the services and decisions personally performed and made by Reza Lynn MD. It was created on " his behalf by Raphael Cardenas, a trained medical scribe. The creation of this document is based on the provider's statements to the medical scribe.  Raphael Cardenas February 28, 2019 7:53 AM     The information in this document, created by the medical scribe for me, accurately reflects the services I personally performed and the decisions made by me. I have reviewed and approved this document for accuracy prior to leaving the patient care area.  February 28, 2019          Reza Lynn MD, FAAFP    [address]  [address]    [phone] Fax

## 2019-11-18 ENCOUNTER — TRANSFERRED RECORDS (OUTPATIENT)
Dept: HEALTH INFORMATION MANAGEMENT | Facility: CLINIC | Age: 59
End: 2019-11-18

## 2019-12-15 ENCOUNTER — HEALTH MAINTENANCE LETTER (OUTPATIENT)
Age: 59
End: 2019-12-15

## 2020-03-05 ENCOUNTER — OFFICE VISIT (OUTPATIENT)
Dept: FAMILY MEDICINE | Facility: CLINIC | Age: 60
End: 2020-03-05
Payer: COMMERCIAL

## 2020-03-05 VITALS
SYSTOLIC BLOOD PRESSURE: 118 MMHG | WEIGHT: 183 LBS | OXYGEN SATURATION: 99 % | HEART RATE: 72 BPM | RESPIRATION RATE: 14 BRPM | BODY MASS INDEX: 27.11 KG/M2 | HEIGHT: 69 IN | DIASTOLIC BLOOD PRESSURE: 76 MMHG | TEMPERATURE: 98.5 F

## 2020-03-05 DIAGNOSIS — Z12.5 SCREENING FOR PROSTATE CANCER: ICD-10-CM

## 2020-03-05 DIAGNOSIS — Z91.09 ENVIRONMENTAL ALLERGIES: ICD-10-CM

## 2020-03-05 DIAGNOSIS — H40.9 GLAUCOMA OF BOTH EYES, UNSPECIFIED GLAUCOMA TYPE: ICD-10-CM

## 2020-03-05 DIAGNOSIS — Z00.00 ENCOUNTER FOR ROUTINE ADULT HEALTH EXAMINATION WITHOUT ABNORMAL FINDINGS: Primary | ICD-10-CM

## 2020-03-05 DIAGNOSIS — Z12.11 SCREEN FOR COLON CANCER: ICD-10-CM

## 2020-03-05 DIAGNOSIS — E03.8 SUBCLINICAL HYPOTHYROIDISM: ICD-10-CM

## 2020-03-05 DIAGNOSIS — D12.6 ADENOMATOUS POLYP OF COLON, UNSPECIFIED PART OF COLON: ICD-10-CM

## 2020-03-05 DIAGNOSIS — E78.5 HYPERLIPIDEMIA LDL GOAL <130: ICD-10-CM

## 2020-03-05 DIAGNOSIS — Z51.81 MEDICATION MONITORING ENCOUNTER: ICD-10-CM

## 2020-03-05 LAB
ALBUMIN SERPL-MCNC: 3.6 G/DL (ref 3.4–5)
ALBUMIN UR-MCNC: NEGATIVE MG/DL
ALP SERPL-CCNC: 65 U/L (ref 40–150)
ALT SERPL W P-5'-P-CCNC: 32 U/L (ref 0–70)
ANION GAP SERPL CALCULATED.3IONS-SCNC: 7 MMOL/L (ref 3–14)
APPEARANCE UR: CLEAR
AST SERPL W P-5'-P-CCNC: 17 U/L (ref 0–45)
BILIRUB SERPL-MCNC: 1.2 MG/DL (ref 0.2–1.3)
BILIRUB UR QL STRIP: NEGATIVE
BUN SERPL-MCNC: 20 MG/DL (ref 7–30)
CALCIUM SERPL-MCNC: 8.7 MG/DL (ref 8.5–10.1)
CHLORIDE SERPL-SCNC: 107 MMOL/L (ref 94–109)
CHOLEST SERPL-MCNC: 170 MG/DL
CK SERPL-CCNC: 170 U/L (ref 30–300)
CO2 SERPL-SCNC: 25 MMOL/L (ref 20–32)
COLOR UR AUTO: YELLOW
CREAT SERPL-MCNC: 1.09 MG/DL (ref 0.66–1.25)
CREAT UR-MCNC: 200 MG/DL
ERYTHROCYTE [DISTWIDTH] IN BLOOD BY AUTOMATED COUNT: 12.6 % (ref 10–15)
GFR SERPL CREATININE-BSD FRML MDRD: 73 ML/MIN/{1.73_M2}
GLUCOSE SERPL-MCNC: 103 MG/DL (ref 70–99)
GLUCOSE UR STRIP-MCNC: NEGATIVE MG/DL
HCT VFR BLD AUTO: 45.8 % (ref 40–53)
HDLC SERPL-MCNC: 39 MG/DL
HGB BLD-MCNC: 14.9 G/DL (ref 13.3–17.7)
HGB UR QL STRIP: ABNORMAL
KETONES UR STRIP-MCNC: NEGATIVE MG/DL
LDLC SERPL CALC-MCNC: 106 MG/DL
LEUKOCYTE ESTERASE UR QL STRIP: NEGATIVE
MCH RBC QN AUTO: 30.7 PG (ref 26.5–33)
MCHC RBC AUTO-ENTMCNC: 32.5 G/DL (ref 31.5–36.5)
MCV RBC AUTO: 94 FL (ref 78–100)
MICROALBUMIN UR-MCNC: 9 MG/L
MICROALBUMIN/CREAT UR: 4.34 MG/G CR (ref 0–17)
NITRATE UR QL: NEGATIVE
NONHDLC SERPL-MCNC: 131 MG/DL
PH UR STRIP: 6 PH (ref 5–7)
PLATELET # BLD AUTO: 169 10E9/L (ref 150–450)
POTASSIUM SERPL-SCNC: 4.2 MMOL/L (ref 3.4–5.3)
PROT SERPL-MCNC: 7.4 G/DL (ref 6.8–8.8)
PSA SERPL-ACNC: 0.84 UG/L (ref 0–4)
RBC # BLD AUTO: 4.85 10E12/L (ref 4.4–5.9)
RBC #/AREA URNS AUTO: NORMAL /HPF
SODIUM SERPL-SCNC: 139 MMOL/L (ref 133–144)
SOURCE: ABNORMAL
SP GR UR STRIP: >1.03 (ref 1–1.03)
T4 FREE SERPL-MCNC: 0.98 NG/DL (ref 0.76–1.46)
TRIGL SERPL-MCNC: 123 MG/DL
TSH SERPL DL<=0.005 MIU/L-ACNC: 2.7 MU/L (ref 0.4–4)
UROBILINOGEN UR STRIP-ACNC: 0.2 EU/DL (ref 0.2–1)
WBC # BLD AUTO: 5 10E9/L (ref 4–11)
WBC #/AREA URNS AUTO: NORMAL /HPF

## 2020-03-05 PROCEDURE — 80061 LIPID PANEL: CPT | Performed by: FAMILY MEDICINE

## 2020-03-05 PROCEDURE — 85027 COMPLETE CBC AUTOMATED: CPT | Performed by: FAMILY MEDICINE

## 2020-03-05 PROCEDURE — 82043 UR ALBUMIN QUANTITATIVE: CPT | Performed by: FAMILY MEDICINE

## 2020-03-05 PROCEDURE — 80053 COMPREHEN METABOLIC PANEL: CPT | Performed by: FAMILY MEDICINE

## 2020-03-05 PROCEDURE — 36415 COLL VENOUS BLD VENIPUNCTURE: CPT | Performed by: FAMILY MEDICINE

## 2020-03-05 PROCEDURE — 99396 PREV VISIT EST AGE 40-64: CPT | Performed by: FAMILY MEDICINE

## 2020-03-05 PROCEDURE — G0103 PSA SCREENING: HCPCS | Performed by: FAMILY MEDICINE

## 2020-03-05 PROCEDURE — 84439 ASSAY OF FREE THYROXINE: CPT | Performed by: FAMILY MEDICINE

## 2020-03-05 PROCEDURE — 84443 ASSAY THYROID STIM HORMONE: CPT | Performed by: FAMILY MEDICINE

## 2020-03-05 PROCEDURE — 81001 URINALYSIS AUTO W/SCOPE: CPT | Performed by: FAMILY MEDICINE

## 2020-03-05 PROCEDURE — 82550 ASSAY OF CK (CPK): CPT | Performed by: FAMILY MEDICINE

## 2020-03-05 RX ORDER — LEVOTHYROXINE SODIUM 50 UG/1
50 TABLET ORAL DAILY
Qty: 90 TABLET | Refills: 3 | Status: SHIPPED | OUTPATIENT
Start: 2020-03-05 | End: 2021-04-21

## 2020-03-05 RX ORDER — PRAVASTATIN SODIUM 20 MG
20 TABLET ORAL DAILY
Qty: 90 TABLET | Refills: 3 | Status: SHIPPED | OUTPATIENT
Start: 2020-03-05 | End: 2021-04-21

## 2020-03-05 ASSESSMENT — MIFFLIN-ST. JEOR: SCORE: 1625.46

## 2020-03-05 NOTE — LETTER
[facility]  [address] [place] [address], [place] [address]                  [phone]   March 5, 2020    Tavo Johnson  [address] [place] [id number]      Dear Tavo,    Here is a summary of your recent test results:    Labs are overall quite good, except:     Borderline elevated glucose.   Mildly low HDL (good cholesterol).     We advise:     Continue current cares.   Balanced low cholesterol diet.   Regular exercise.     For additional lab test information, [email or web address] is an excellent reference.     Your test results are enclosed.      Please contact me if you have any questions.    In addition, here is a list of due or overdue Health Maintenance reminders.    Health Maintenance Due   Topic Date Due     HIV Screening  06/17/1973     Zoster (Shingles) Vaccine (1 of 2) 06/17/2008     Flu Vaccine (1) 09/01/2019     FIT Test  01/16/2020     Cholesterol Lab  02/02/2020     Prostate Test  02/02/2020     Thyroid Function Lab  02/02/2020     Preventive Care Visit  02/28/2020       Please call us at [phone] (or use [employer] to address the above recommendations.            Thank you very much for trusting [facility]..     Healthy regards,        Reza Lynn M.D.        Results for orders placed or performed in visit on 03/05/20   Comprehensive metabolic panel     Status: Abnormal   Result Value Ref Range    Sodium 139 133 - 144 mmol/L    Potassium 4.2 3.4 - 5.3 mmol/L    Chloride 107 94 - 109 mmol/L    Carbon Dioxide 25 20 - 32 mmol/L    Anion Gap 7 3 - 14 mmol/L    Glucose 103 (H) 70 - 99 mg/dL    Urea Nitrogen 20 7 - 30 mg/dL    Creatinine 1.09 0.66 - 1.25 mg/dL    GFR Estimate 73 >60 mL/min/[1.73_m2]    GFR Estimate If Black 84 >60 mL/min/[1.73_m2]    Calcium 8.7 8.5 - 10.1 mg/dL    Bilirubin Total 1.2 0.2 - 1.3 mg/dL    Albumin 3.6 3.4 - 5.0 g/dL    Protein Total 7.4 6.8 - 8.8 g/dL    Alkaline Phosphatase 65 40 - 150 U/L    ALT 32 0  - 70 U/L    AST 17 0 - 45 U/L   Lipid panel reflex to direct LDL Fasting     Status: Abnormal   Result Value Ref Range    Cholesterol 170 <200 mg/dL    Triglycerides 123 <150 mg/dL    HDL Cholesterol 39 (L) >39 mg/dL    LDL Cholesterol Calculated 106 (H) <100 mg/dL    Non HDL Cholesterol 131 (H) <130 mg/dL   CK total     Status: None   Result Value Ref Range    CK Total 170 30 - 300 U/L   CBC with platelets     Status: None   Result Value Ref Range    WBC 5.0 4.0 - 11.0 10e9/L    RBC Count 4.85 4.4 - 5.9 10e12/L    Hemoglobin 14.9 13.3 - 17.7 g/dL    Hematocrit 45.8 40.0 - 53.0 %    MCV 94 78 - 100 fl    MCH 30.7 26.5 - 33.0 pg    MCHC 32.5 31.5 - 36.5 g/dL    RDW 12.6 10.0 - 15.0 %    Platelet Count 169 150 - 450 10e9/L   TSH with free T4 reflex     Status: None   Result Value Ref Range    TSH 2.70 0.40 - 4.00 mU/L   UA reflex to Microscopic and Culture     Status: Abnormal   Result Value Ref Range    Color Urine Yellow     Appearance Urine Clear     Glucose Urine Negative NEG^Negative mg/dL    Bilirubin Urine Negative NEG^Negative    Ketones Urine Negative NEG^Negative mg/dL    Specific Gravity Urine >1.030 1.003 - 1.035    Blood Urine Trace (A) NEG^Negative    pH Urine 6.0 5.0 - 7.0 pH    Protein Albumin Urine Negative NEG^Negative mg/dL    Urobilinogen Urine 0.2 0.2 - 1.0 EU/dL    Nitrite Urine Negative NEG^Negative    Leukocyte Esterase Urine Negative NEG^Negative    Source Midstream Urine    Albumin Random Urine Quantitative with Creat Ratio     Status: None   Result Value Ref Range    Creatinine Urine 200 mg/dL    Albumin Urine mg/L 9 mg/L    Albumin Urine mg/g Cr 4.34 0 - 17 mg/g Cr   Prostate spec antigen screen     Status: None   Result Value Ref Range    PSA 0.84 0 - 4 ug/L   T4 free     Status: None   Result Value Ref Range    T4 Free 0.98 0.76 - 1.46 ng/dL   Urine Microscopic     Status: None   Result Value Ref Range    WBC Urine 0 - 5 OTO5^0 - 5 /HPF    RBC Urine O - 2 OTO2^O - 2 /HPF

## 2020-03-05 NOTE — PROGRESS NOTES
SUBJECTIVE:   CC: Tavo Johnson is an 59 year old male who presents for preventative health visit.     Healthy Habits:     Getting at least 3 servings of Calcium per day:  Yes    Bi-annual eye exam:  Yes    Dental care twice a year:  Yes    Sleep apnea or symptoms of sleep apnea:  Daytime drowsiness    Diet:  Regular (no restrictions)    Frequency of exercise:  2-3 days/week    Duration of exercise:  45-60 minutes    Taking medications regularly:  Yes    Medication side effects:  No muscle aches    PHQ-2 Total Score: 0    Additional concerns today:  No    Today's PHQ-2 Score:   PHQ-2 ( 1999 Pfizer) 3/3/2020   Q1: Little interest or pleasure in doing things 0   Q2: Feeling down, depressed or hopeless 0   PHQ-2 Score 0   Q1: Little interest or pleasure in doing things Not at all   Q2: Feeling down, depressed or hopeless Not at all   PHQ-2 Score 0     Abuse: Current or Past(Physical, Sexual or Emotional)- NO  Do you feel safe in your environment? YES    Social History     Tobacco Use     Smoking status: Never Smoker     Smokeless tobacco: Never Used   Substance Use Topics     Alcohol use: Yes     Alcohol/week: 2.0 - 3.0 standard drinks     Types: 2 - 3 Standard drinks or equivalent per week     Comment: 2-3 drinks per week avg     If you drink alcohol do you typically have >3 drinks per day or >7 drinks per week? No    Alcohol Use 3/5/2020   Prescreen: >3 drinks/day or >7 drinks/week? -   Prescreen: >3 drinks/day or >7 drinks/week? No       Last PSA:   PSA   Date Value Ref Range Status   02/02/2019 0.48 0 - 4 ug/L Final     Comment:     Assay Method:  Chemiluminescence using Siemens Vista analyzer       Reviewed orders with patient. Reviewed health maintenance and updated orders accordingly - Yes    Reviewed and updated as needed this visit by clinical staff  Tobacco  Allergies  Meds  Soc Hx      Reviewed and updated as needed this visit by Provider    Subclinical Hypothyroid    TSH   Date Value Ref Range Status    02/02/2019 2.80 0.40 - 4.00 mU/L Final     Lipids    Recent Labs   Lab Test 02/02/19  1057 02/23/18  0801  01/30/15  0815 12/31/13  0809   CHOL 129 168   < > 162 173   HDL 40 47   < > 44 41   LDL 73 98   < > 89 106   TRIG 82 113   < > 143 128   CHOLHDLRATIO  --   --   --  3.7 4.2    < > = values in this interval not displayed.     Glaucoma - 2 drops daily, followed by ophth    Environmental allergies - claritin prn    Health Maintenance     Colonoscopy:  11/18/19, Due November, 2024   FIT:  1/16/19              PSA:  2/2/19       Health Maintenance Due   Topic Date Due     HIV SCREENING  06/17/1973     ZOSTER IMMUNIZATION (1 of 2) 06/17/2008     INFLUENZA VACCINE (1) 09/01/2019     FIT  01/16/2020     LIPID  02/02/2020     PSA  02/02/2020     TSH W/FREE T4 REFLEX  02/02/2020     PREVENTIVE CARE VISIT  02/28/2020       Current Problem List    Patient Active Problem List   Diagnosis     Seasonal allergies     Adenomatous polyp of colon     Subclinical hypothyroidism     Hyperlipidemia LDL goal <130     Advanced directives, counseling/discussion     PVC (premature ventricular contraction)     Environmental allergies     Glaucoma       Past Medical History    Past Medical History:   Diagnosis Date     Adenomatous polyp of colon 9/09, 10/14    tubular - due 5 yrs     Environmental allergies     seasonal - spring     Glaucoma 05/2005    [facility] - bilateral     Hyperlipidemia LDL goal <130     borderline     PVC (premature ventricular contraction) 12/15     Subclinical hypothyroidism 9/09       Past Surgical History    Past Surgical History:   Procedure Laterality Date     COLONOSCOPY  9/09, 10/14, 11/19    History of  tubular adenomatous polyps - due 5 yrs - internal hemorrhoids     STRESS ECHO ([facility])  3/07, 10/09, 12/11, 1/14    normal     SURGICAL HISTORY OF -  Bilateral 06/1987    HERNIA - JUNIOR     SURGICAL HISTORY OF -  Right 1996    RT SHOULDER - Dr Julien       Current Medications    Current Outpatient  Medications   Medication Sig Dispense Refill     ASPIRIN 81 MG OR TABS ONE DAILY 100 3     levothyroxine (SYNTHROID/LEVOTHROID) 50 MCG tablet Take 1 tablet (50 mcg) by mouth daily 90 tablet 3     pravastatin (PRAVACHOL) 20 MG tablet Take 1 tablet (20 mg) by mouth daily 90 tablet 3     XALATAN 0.005 % OP SOLN None Entered         Allergies    Allergies   Allergen Reactions     Amoxicillin Rash     rash     Azithromycin Tinnitus     ?       Immunizations    Immunization History   Administered Date(s) Administered     Influenza (IIV3) PF 11/01/2014     Pneumococcal 23 valent 02/28/2019     TD (ADULT, 7+) 11/18/1996     TDAP Vaccine (Adacel) 02/23/2007     Tdap (Adacel,Boostrix) 09/26/2015     Twinrix A/B 12/31/2013, 02/06/2014, 07/05/2014       Family History    Family History   Problem Relation Age of Onset     C.A.D. Father 61        age 61     Thyroid Disease Father      C.A.D. Brother 42        age 42     No Known Problems Mother      Coronary Artery Disease Sister         cabg     No Known Problems Sister      Cancer Paternal Grandfather         stomach ca     Cancer Paternal Grandmother         stomach ca     Pancreatic Cancer Cousin         1st cousin     Depression Brother      Mental Illness Brother         anxiety     No Known Problems Maternal Grandmother      No Known Problems Maternal Grandfather        Social History    Social History     Socioeconomic History     Marital status:      Spouse name: Maribell     Number of children: 3     Years of education: 16     Highest education level: Not on file   Occupational History     Employer: [employer]   Social Needs     Financial resource strain: Not on file     Food insecurity:     Worry: Not on file     Inability: Not on file     Transportation needs:     Medical: Not on file     Non-medical: Not on file   Tobacco Use     Smoking status: Never Smoker     Smokeless tobacco: Never Used   Substance and Sexual Activity     Alcohol use: Yes      Alcohol/week: 2.0 - 3.0 standard drinks     Types: 2 - 3 Standard drinks or equivalent per week     Comment: 2-3 drinks per week avg     Drug use: No     Sexual activity: Yes     Partners: Female     Birth control/protection: Male Surgical   Lifestyle     Physical activity:     Days per week: Not on file     Minutes per session: Not on file     Stress: Not on file   Relationships     Social connections:     Talks on phone: Not on file     Gets together: Not on file     Attends Hinduism service: Not on file     Active member of club or organization: Not on file     Attends meetings of clubs or organizations: Not on file     Relationship status: Not on file     Intimate partner violence:     Fear of current or ex partner: Not on file     Emotionally abused: Not on file     Physically abused: Not on file     Forced sexual activity: Not on file   Other Topics Concern      Service No     Blood Transfusions No     Caffeine Concern Yes     Comment: 1-2 cups qd     Occupational Exposure No     Hobby Hazards No     Sleep Concern No     Stress Concern No     Weight Concern No     Special Diet Not Asked     Back Care Not Asked     Exercise Yes     Comment: lifts and treadmill - Wii Fit - [employer] - 2-3/week     Bike Helmet Not Asked     Seat Belt Yes     Self-Exams Not Asked     Parent/sibling w/ CABG, MI or angioplasty before 65F 55M? Yes   Social History Narrative     Not on file       ROS    CONSTITUTIONAL: NEGATIVE for fever, chills, change in weight  INTEGUMENTARY/SKIN: NEGATIVE for worrisome rashes, moles or lesions  EYES: NEGATIVE for vision changes or irritation  ENT/MOUTH: NEGATIVE for ear, mouth and throat problems  RESP: NEGATIVE for significant cough or SOB  BREAST: NEGATIVE for masses, tenderness or discharge  CV: NEGATIVE for chest pain, palpitations or peripheral edema  GI: NEGATIVE for nausea, abdominal pain, heartburn, or change in bowel habits  : NEGATIVE for frequency, dysuria, or  "hematuria  MUSCULOSKELETAL: NEGATIVE for significant arthralgias or myalgia  NEURO: NEGATIVE for weakness, dizziness or paresthesias  ENDOCRINE: NEGATIVE for temperature intolerance, skin/hair changes  HEME: NEGATIVE for bleeding problems  PSYCHIATRIC: NEGATIVE for changes in mood or affect    OBJECTIVE    /76   Pulse 72   Temp 98.5  F (36.9  C) (Oral)   Resp 14   Ht 1.753 m (5' 9\")   Wt 83 kg (183 lb)   SpO2 99%   BMI 27.02 kg/m      EXAM:    GENERAL: healthy, alert and no distress  EYES: Eyes grossly normal to inspection, PERRL and conjunctivae and sclerae normal  HENT: ear canals and TM's normal, nose and mouth without ulcers or lesions  NECK: no adenopathy, no asymmetry, masses, or scars and thyroid normal to palpation  RESP: lungs clear to auscultation - no rales, rhonchi or wheezes  CV: regular rate and rhythm, normal S1 S2, no S3 or S4, no murmur, click or rub, no peripheral edema and peripheral pulses strong  ABDOMEN: soft, nontender, no hepatosplenomegaly, no masses and bowel sounds normal   (male): testicles normal without atrophy or masses, no hernias and penis normal without urethral discharge  RECTAL: normal sphincter tone, no rectal masses and prostate of normal size for age, smooth, nontender without masses/nodules  MS: no gross musculoskeletal defects noted, no edema  SKIN: no suspicious lesions or rashes  NEURO: Normal strength and tone, mentation intact and speech normal  PSYCH: mentation appears normal, affect normal/bright  LYMPH: no cervical, supraclavicular, axillary, or inguinal adenopathy    DIAGNOSTICS/PROCEDURES    Pending    ASSESSMENT      ICD-10-CM    1. Encounter for routine adult health examination without abnormal findings Z00.00 Comprehensive metabolic panel     Lipid panel reflex to direct LDL Fasting     CK total     CBC with platelets     TSH with free T4 reflex     UA reflex to Microscopic and Culture     Albumin Random Urine Quantitative with Creat Ratio     " Prostate spec antigen screen     Fecal colorectal cancer screen FIT     T4 free     Urine Microscopic   2. Subclinical hypothyroidism E03.9 TSH with free T4 reflex     T4 free     levothyroxine (SYNTHROID/LEVOTHROID) 50 MCG tablet   3. Hyperlipidemia LDL goal <130 E78.5 Comprehensive metabolic panel     Lipid panel reflex to direct LDL Fasting     CK total     pravastatin (PRAVACHOL) 20 MG tablet   4. Environmental allergies Z91.09    5. Glaucoma of both eyes, unspecified glaucoma type H40.9    6. Adenomatous polyp of colon, unspecified part of colon D12.6 Fecal colorectal cancer screen FIT   7. Screen for colon cancer Z12.11 Fecal colorectal cancer screen FIT   8. Screening for prostate cancer Z12.5 Prostate spec antigen screen   9. Medication monitoring encounter Z51.81 Comprehensive metabolic panel     Lipid panel reflex to direct LDL Fasting     CK total     CBC with platelets     TSH with free T4 reflex     UA reflex to Microscopic and Culture     Albumin Random Urine Quantitative with Creat Ratio     T4 free       PLAN    Discussed treatment/modality options, including risk and benefits, he desires:    advised alcohol consumption 1oz per day or less, advised aspirin 81 mg po daily, advised 1 multivitamin per day, advised calcium 0216-9165 mg/d and Vitamin D 800-1200 IU/d, advised dentist every 6 months, advised diet and exercise, advised opthalmologist every 6-12 months, advised self testicular exam q month, further health care maintenance, further lab(s), medication refill(s) and observation    All diagnosis above reviewed and noted above, otherwise stable.      See [facility] orders for further details.      1) labs    2) med refills    3) consider flu/shingrix    4) low cholesterol diet, regular exercise    Return in about 1 year (around 3/5/2021), or if symptoms worsen or fail to improve, for Complete Physical, Medication Recheck Visit, Follow Up Chronic.    Health Maintenance Due   Topic Date Due     HIV  "SCREENING  06/17/1973     ZOSTER IMMUNIZATION (1 of 2) 06/17/2008     INFLUENZA VACCINE (1) 09/01/2019     FIT  01/16/2020     LIPID  02/02/2020     PSA  02/02/2020     TSH W/FREE T4 REFLEX  02/02/2020     PREVENTIVE CARE VISIT  02/28/2020       COUNSELING    Reviewed preventive health counseling, as reflected in patient instructions    BP Readings from Last 1 Encounters:   03/05/20 118/76     Estimated body mass index is 27.02 kg/m  as calculated from the following:    Height as of this encounter: 1.753 m (5' 9\").    Weight as of this encounter: 83 kg (183 lb).           reports that he has never smoked. He has never used smokeless tobacco.      Counseling Resources:    ATP IV Guidelines  Pooled Cohorts Equation Calculator  FRAX Risk Assessment  ICSI Preventive Guidelines  Dietary Guidelines for Americans, 2010  USDA's MyPlate  ASA Prophylaxis  Lung CA Screening           Reza Lynn MD, FAAFP     [facility]  [address] [place] [id number]  [email or web address]   Office: [phone]  Fax: [phone]  Pager: [phone]       "

## 2021-01-15 ENCOUNTER — HEALTH MAINTENANCE LETTER (OUTPATIENT)
Age: 61
End: 2021-01-15

## 2021-04-14 ENCOUNTER — OFFICE VISIT (OUTPATIENT)
Dept: NURSING | Facility: CLINIC | Age: 61
End: 2021-04-14
Payer: COMMERCIAL

## 2021-04-14 PROCEDURE — 91300 PR COVID VAC PFIZER DIL RECON 30 MCG/0.3 ML IM: CPT

## 2021-04-14 PROCEDURE — 0001A PR COVID VAC PFIZER DIL RECON 30 MCG/0.3 ML IM: CPT

## 2021-04-21 ENCOUNTER — OFFICE VISIT (OUTPATIENT)
Dept: FAMILY MEDICINE | Facility: CLINIC | Age: 61
End: 2021-04-21
Payer: COMMERCIAL

## 2021-04-21 VITALS
DIASTOLIC BLOOD PRESSURE: 82 MMHG | HEART RATE: 63 BPM | OXYGEN SATURATION: 99 % | RESPIRATION RATE: 20 BRPM | SYSTOLIC BLOOD PRESSURE: 130 MMHG | WEIGHT: 187 LBS | BODY MASS INDEX: 27.7 KG/M2 | HEIGHT: 69 IN | TEMPERATURE: 97.4 F

## 2021-04-21 DIAGNOSIS — D12.6 ADENOMATOUS POLYP OF COLON, UNSPECIFIED PART OF COLON: ICD-10-CM

## 2021-04-21 DIAGNOSIS — Z00.00 ROUTINE GENERAL MEDICAL EXAMINATION AT A HEALTH CARE FACILITY: Primary | ICD-10-CM

## 2021-04-21 DIAGNOSIS — Z12.11 SCREEN FOR COLON CANCER: ICD-10-CM

## 2021-04-21 DIAGNOSIS — Z12.5 SCREENING FOR PROSTATE CANCER: ICD-10-CM

## 2021-04-21 DIAGNOSIS — H40.9 GLAUCOMA OF BOTH EYES, UNSPECIFIED GLAUCOMA TYPE: ICD-10-CM

## 2021-04-21 DIAGNOSIS — I49.3 PVC (PREMATURE VENTRICULAR CONTRACTION): ICD-10-CM

## 2021-04-21 DIAGNOSIS — E03.8 SUBCLINICAL HYPOTHYROIDISM: ICD-10-CM

## 2021-04-21 DIAGNOSIS — Z51.81 MEDICATION MONITORING ENCOUNTER: ICD-10-CM

## 2021-04-21 DIAGNOSIS — Z91.09 ENVIRONMENTAL ALLERGIES: ICD-10-CM

## 2021-04-21 DIAGNOSIS — E78.5 HYPERLIPIDEMIA LDL GOAL <130: ICD-10-CM

## 2021-04-21 PROCEDURE — 99396 PREV VISIT EST AGE 40-64: CPT | Performed by: FAMILY MEDICINE

## 2021-04-21 RX ORDER — PRAVASTATIN SODIUM 20 MG
20 TABLET ORAL DAILY
Qty: 90 TABLET | Refills: 3 | Status: SHIPPED | OUTPATIENT
Start: 2021-04-21 | End: 2022-04-20

## 2021-04-21 RX ORDER — BRIMONIDINE TARTRATE AND TIMOLOL MALEATE 2; 5 MG/ML; MG/ML
SOLUTION OPHTHALMIC
COMMUNITY
Start: 2020-01-20

## 2021-04-21 RX ORDER — LEVOTHYROXINE SODIUM 50 UG/1
50 TABLET ORAL DAILY
Qty: 90 TABLET | Refills: 3 | Status: SHIPPED | OUTPATIENT
Start: 2021-04-21 | End: 2022-04-20

## 2021-04-21 ASSESSMENT — PAIN SCALES - GENERAL: PAINLEVEL: NO PAIN (0)

## 2021-04-21 ASSESSMENT — MIFFLIN-ST. JEOR: SCORE: 1638.61

## 2021-04-21 NOTE — PROGRESS NOTES
[facility]  [place]    SUBJECTIVE    CC: Tavo Johnson is an 60 year old male who presents for preventative health visit.     Patient has been advised of split billing requirements and indicates understanding: Yes     Answers for HPI/ROS submitted by the patient on 4/15/2021   Annual Exam:  Frequency of exercise:: 2-3 days/week  Getting at least 3 servings of Calcium per day:: Yes  Diet:: Regular (no restrictions)  Taking medications regularly:: Yes  Medication side effects:: None  Bi-annual eye exam:: Yes  Dental care twice a year:: Yes  Sleep apnea or symptoms of sleep apnea:: Daytime drowsiness  Additional concerns today:: No  Duration of exercise:: 15-30 minutes    Today's PHQ-2 Score:   PHQ-2 ( 1999 Pfizer) 4/15/2021   Q1: Little interest or pleasure in doing things 0   Q2: Feeling down, depressed or hopeless 0   PHQ-2 Score 0   Q1: Little interest or pleasure in doing things Not at all   Q2: Feeling down, depressed or hopeless Not at all   PHQ-2 Score 0       Abuse: Current or Past(Physical, Sexual or Emotional)- No  Do you feel safe in your environment? Yes        Social History     Tobacco Use     Smoking status: Never Smoker     Smokeless tobacco: Never Used   Substance Use Topics     Alcohol use: Yes     Alcohol/week: 2.0 - 3.0 standard drinks     Types: 2 - 3 Standard drinks or equivalent per week     Comment: 2-3 drinks per week avg     If you drink alcohol do you typically have >3 drinks per day or >7 drinks per week? No    Alcohol Use 4/21/2021   Prescreen: >3 drinks/day or >7 drinks/week? -   Prescreen: >3 drinks/day or >7 drinks/week? No       Last PSA:   PSA   Date Value Ref Range Status   03/05/2020 0.84 0 - 4 ug/L Final     Comment:     Assay Method:  Chemiluminescence using Siemens Vista analyzer       Reviewed orders with patient. Reviewed health maintenance and updated orders accordingly - Yes    Lipids    Recent Labs   Lab Test 03/05/20  0747 02/02/19  1057 01/30/15  0815  01/30/15  0815 12/31/13  0809   CHOL 170 129   < > 162 173   HDL 39* 40   < > 44 41   * 73   < > 89 106   TRIG 123 82   < > 143 128   CHOLHDLRATIO  --   --   --  3.7 4.2    < > = values in this interval not displayed.     Hypothyroid    TSH   Date Value Ref Range Status   03/05/2020 2.70 0.40 - 4.00 mU/L Final     Environmental allergies - allegra controls    Glaucoma - follows with Ophthalmology - [facility]    PVC - no issues    OA - bilateral knee pain after exercise    Health Maintenance     Colonoscopy:  Due 11/2024   FIT:  given              PSA:  pending   DEXA:  NA    Health Maintenance Due   Topic Date Due     ZOSTER IMMUNIZATION (1 of 2) Never done     LIPID  03/05/2021     PSA  03/05/2021     TSH W/FREE T4 REFLEX  03/05/2021     T4 FREE  03/05/2021       Current Problem List    Patient Active Problem List   Diagnosis     Seasonal allergies     Adenomatous polyp of colon     Subclinical hypothyroidism     Hyperlipidemia LDL goal <130     Advanced directives, counseling/discussion     PVC (premature ventricular contraction)     Environmental allergies     Glaucoma       Past Medical History    Past Medical History:   Diagnosis Date     Adenomatous polyp of colon 9/09, 10/14    tubular - due 5 yrs     Environmental allergies     seasonal - spring     Glaucoma 05/2005    [facility] - bilateral     Hyperlipidemia LDL goal <130     borderline     PVC (premature ventricular contraction) 12/15     Subclinical hypothyroidism 9/09       Past Surgical History    Past Surgical History:   Procedure Laterality Date     COLONOSCOPY  9/09, 10/14, 11/19    History of  tubular adenomatous polyps - due 5 yrs - internal hemorrhoids     STRESS ECHO (METRO)  3/07, 10/09, 12/11, 1/14    normal     SURGICAL HISTORY OF -  Bilateral 06/1987    HERNIA - JUNIOR     SURGICAL HISTORY OF -  Right 1996    RT SHOULDER - Dr Julien       Current Medications    Current Outpatient Medications   Medication Sig Dispense Refill     ASPIRIN  81 MG OR TABS ONE DAILY 100 3     brimonidine-timolol (COMBIGAN) 0.2-0.5 % ophthalmic solution        levothyroxine (SYNTHROID/LEVOTHROID) 50 MCG tablet Take 1 tablet (50 mcg) by mouth daily 90 tablet 3     pravastatin (PRAVACHOL) 20 MG tablet Take 1 tablet (20 mg) by mouth daily 90 tablet 3     XALATAN 0.005 % OP SOLN None Entered         Allergies    Allergies   Allergen Reactions     Amoxicillin Rash     rash     Azithromycin Tinnitus     ?       Immunizations    Immunization History   Administered Date(s) Administered     COVID-19,PF,Pfizer 04/14/2021     Influenza (IIV3) PF 11/01/2014     Pneumococcal 23 valent 02/28/2019     TD (ADULT, 7+) 11/18/1996     TDAP Vaccine (Adacel) 02/23/2007     Tdap (Adacel,Boostrix) 09/26/2015     Twinrix A/B 12/31/2013, 02/06/2014, 07/05/2014       Family History    Family History   Problem Relation Age of Onset     C.A.D. Father 61        age 61     Thyroid Disease Father      C.A.D. Brother 42        age 42     No Known Problems Mother      Coronary Artery Disease Sister         cabg     No Known Problems Sister      Cancer Paternal Grandfather         stomach ca     Cancer Paternal Grandmother         stomach ca     Pancreatic Cancer Cousin         1st cousin     Depression Brother      Mental Illness Brother         anxiety     No Known Problems Maternal Grandmother      No Known Problems Maternal Grandfather        Social History    Social History     Socioeconomic History     Marital status:      Spouse name: Maribell     Number of children: 3     Years of education: 16     Highest education level: Not on file   Occupational History     Employer: [employer]   Social Needs     Financial resource strain: Not on file     Food insecurity     Worry: Not on file     Inability: Not on file     Transportation needs     Medical: Not on file     Non-medical: Not on file   Tobacco Use     Smoking status: Never Smoker     Smokeless tobacco: Never Used   Substance and Sexual  Activity     Alcohol use: Yes     Alcohol/week: 2.0 - 3.0 standard drinks     Types: 2 - 3 Standard drinks or equivalent per week     Comment: 2-3 drinks per week avg     Drug use: No     Sexual activity: Yes     Partners: Female     Birth control/protection: Male Surgical   Lifestyle     Physical activity     Days per week: Not on file     Minutes per session: Not on file     Stress: Not on file   Relationships     Social connections     Talks on phone: Not on file     Gets together: Not on file     Attends Tenriism service: Not on file     Active member of club or organization: Not on file     Attends meetings of clubs or organizations: Not on file     Relationship status: Not on file     Intimate partner violence     Fear of current or ex partner: Not on file     Emotionally abused: Not on file     Physically abused: Not on file     Forced sexual activity: Not on file   Other Topics Concern      Service No     Blood Transfusions No     Caffeine Concern Yes     Comment: 1-2 cups qd     Occupational Exposure No     Hobby Hazards No     Sleep Concern No     Stress Concern No     Weight Concern No     Special Diet Not Asked     Back Care Not Asked     Exercise Yes     Comment: lifts and treadmill - Wii Fit - [employer] - 2-3/week     Bike Helmet Not Asked     Seat Belt Yes     Self-Exams Not Asked     Parent/sibling w/ CABG, MI or angioplasty before 65F 55M? Yes   Social History Narrative     Not on file       ROS    CONSTITUTIONAL: NEGATIVE for fever, chills, change in weight  INTEGUMENTARY/SKIN: NEGATIVE for worrisome rashes, moles or lesions  EYES: NEGATIVE for vision changes or irritation  ENT/MOUTH: NEGATIVE for ear, mouth and throat problems  RESP: NEGATIVE for significant cough or SOB  BREAST: NEGATIVE for masses, tenderness or discharge  CV: NEGATIVE for chest pain, palpitations or peripheral edema  GI: NEGATIVE for nausea, abdominal pain, heartburn, or change in bowel habits  : NEGATIVE for  "frequency, dysuria, or hematuria  MUSCULOSKELETAL: NEGATIVE for significant arthralgias or myalgia  NEURO: NEGATIVE for weakness, dizziness or paresthesias  ENDOCRINE: NEGATIVE for temperature intolerance, skin/hair changes  HEME: NEGATIVE for bleeding problems  PSYCHIATRIC: NEGATIVE for changes in mood or affect    OBJECTIVE    /82   Pulse 63   Temp 97.4  F (36.3  C) (Tympanic)   Resp 20   Ht 1.753 m (5' 9\")   Wt 84.8 kg (187 lb)   SpO2 99%   BMI 27.62 kg/m      EXAM:    GENERAL: healthy, alert and no distress  EYES: Eyes grossly normal to inspection, PERRL and conjunctivae and sclerae normal  HENT: ear canals and TM's normal, nose and mouth without ulcers or lesions  NECK: no adenopathy, no asymmetry, masses, or scars and thyroid normal to palpation  RESP: lungs clear to auscultation - no rales, rhonchi or wheezes  CV: regular rate and rhythm, normal S1 S2, no S3 or S4, no murmur, click or rub, no peripheral edema and peripheral pulses strong  ABDOMEN: soft, nontender, no hepatosplenomegaly, no masses and bowel sounds normal   (male): testicles normal without atrophy or masses, no hernias and penis normal without urethral discharge  RECTAL: normal sphincter tone, no rectal masses, prostate of normal size for age, smooth, nontender without masses/nodules and prostate 1+ enlarged, nontender  MS: no gross musculoskeletal defects noted, no edema  SKIN: no suspicious lesions or rashes  NEURO: Normal strength and tone, mentation intact and speech normal  PSYCH: mentation appears normal, affect normal/bright  LYMPH: no cervical, supraclavicular, axillary, or inguinal adenopathy    DIAGNOSTICS/PROCEDURES    Pending    ASSESSMENT      ICD-10-CM    1. Routine general medical examination at a health care facility  Z00.00 Comprehensive metabolic panel     Lipid panel reflex to direct LDL Fasting     CK total     CBC with platelets     UA reflex to Microscopic and Culture     Albumin Random Urine Quantitative " with Creat Ratio     Prostate spec antigen screen     Fecal colorectal cancer screen FIT     REVIEW OF HEALTH MAINTENANCE PROTOCOL ORDERS     TSH     T4, free   2. Hyperlipidemia LDL goal <130  E78.5 Comprehensive metabolic panel     Lipid panel reflex to direct LDL Fasting     CK total     REVIEW OF HEALTH MAINTENANCE PROTOCOL ORDERS     pravastatin (PRAVACHOL) 20 MG tablet   3. Subclinical hypothyroidism  E03.9 REVIEW OF HEALTH MAINTENANCE PROTOCOL ORDERS     TSH     T4, free     levothyroxine (SYNTHROID/LEVOTHROID) 50 MCG tablet   4. Environmental allergies  Z91.09 REVIEW OF HEALTH MAINTENANCE PROTOCOL ORDERS   5. PVC (premature ventricular contraction)  I49.3 Comprehensive metabolic panel     REVIEW OF HEALTH MAINTENANCE PROTOCOL ORDERS   6. Glaucoma of both eyes, unspecified glaucoma type  H40.9 REVIEW OF HEALTH MAINTENANCE PROTOCOL ORDERS   7. Adenomatous polyp of colon, unspecified part of colon  D12.6 Fecal colorectal cancer screen FIT     REVIEW OF HEALTH MAINTENANCE PROTOCOL ORDERS   8. Screen for colon cancer  Z12.11 Fecal colorectal cancer screen FIT     REVIEW OF HEALTH MAINTENANCE PROTOCOL ORDERS   9. Screening for prostate cancer  Z12.5 Prostate spec antigen screen     REVIEW OF HEALTH MAINTENANCE PROTOCOL ORDERS   10. Medication monitoring encounter  Z51.81 Comprehensive metabolic panel     Lipid panel reflex to direct LDL Fasting     CK total     CBC with platelets     UA reflex to Microscopic and Culture     Albumin Random Urine Quantitative with Creat Ratio     REVIEW OF HEALTH MAINTENANCE PROTOCOL ORDERS     TSH     T4, free       PLAN    Discussed treatment/modality options, including risk and benefits, he desires:    advised alcohol consumption 1oz per day or less, advised aspirin 81 mg po daily, advised 1 multivitamin per day, advised calcium 7892-4553 mg/d and Vitamin D 800-1200 IU/d, advised dentist every 6 months, advised diet and exercise, advised opthalmologist every 3-4 months, advised  "self testicular exam q month, further health care maintenance, further lab(s), immunization(s), medication refill(s) and observation    All diagnosis above reviewed and noted above, otherwise stable.      See [facility] orders for further details.      1) med refills    2) labs pending/scheduled    3) last COVID 5/4/2021    4) consider shingrix    Return in about 1 year (around 4/21/2022), or if symptoms worsen or fail to improve, for Complete Physical, Medication Recheck Visit, Follow Up Chronic.    Health Maintenance Due   Topic Date Due     ZOSTER IMMUNIZATION (1 of 2) Never done     LIPID  03/05/2021     PSA  03/05/2021     TSH W/FREE T4 REFLEX  03/05/2021     T4 FREE  03/05/2021       COUNSELING    Reviewed preventive health counseling, as reflected in patient instructions    BP Readings from Last 1 Encounters:   04/21/21 130/82     Estimated body mass index is 27.62 kg/m  as calculated from the following:    Height as of this encounter: 1.753 m (5' 9\").    Weight as of this encounter: 84.8 kg (187 lb).           reports that he has never smoked. He has never used smokeless tobacco.      Counseling Resources:    ATP IV Guidelines  Pooled Cohorts Equation Calculator  FRAX Risk Assessment  ICSI Preventive Guidelines  Dietary Guidelines for Americans, 2010  USDA's MyPlate  ASA Prophylaxis  Lung CA Screening           Reza Lynn MD, FAAFP     [facility]  [address] [place] [id number]  [email or web address]   Office: [phone]  Fax: [phone]  Pager: [phone]       Answers for HPI/ROS submitted by the patient on 4/15/2021   Annual Exam:  Frequency of exercise:: 2-3 days/week  Getting at least 3 servings of Calcium per day:: Yes  Diet:: Regular (no restrictions)  Taking medications regularly:: Yes  Medication side effects:: None  Bi-annual eye exam:: Yes  Dental care twice a year:: Yes  Sleep apnea or symptoms of " sleep apnea:: Daytime drowsiness  Additional concerns today:: No  Duration of exercise:: 15-30 minutes

## 2021-04-21 NOTE — PROGRESS NOTES
"  3  SUBJECTIVE:   CC: Tavo Johnson is an 60 year old male who presents for preventive health visit.     {Split Bill scripting  The purpose of this visit is to discuss your medical history and prevent health problems before you are sick. You may be responsible for a co-pay, coinsurance, or deductible if your visit today includes services such as checking on a sore throat, having an x-ray or lab test, or treating and evaluating a new or existing condition :067384}  Patient has been advised of split billing requirements and indicates understanding: {Yes and No:537935}  Healthy Habits:    Do you get at least three servings of calcium containing foods daily (dairy, green leafy vegetables, etc.)? { :753737::\"yes\"}    Amount of exercise or daily activities, outside of work: { :530774}    Problems taking medications regularly { :802516::\"No\"}    Medication side effects: { :755186::\"No\"}    Have you had an eye exam in the past two years? { :590090}    Do you see a dentist twice per year? { :571891}    Do you have sleep apnea, excessive snoring or daytime drowsiness?{ :776699}  {Outside tests to abstract? :399478}    {additional problems to add (Optional):493687}    Today's PHQ-2 Score:   PHQ-2 ( 1999 Pfizer) 4/15/2021 3/3/2020   Q1: Little interest or pleasure in doing things 0 0   Q2: Feeling down, depressed or hopeless 0 0   PHQ-2 Score 0 0   Q1: Little interest or pleasure in doing things Not at all Not at all   Q2: Feeling down, depressed or hopeless Not at all Not at all   PHQ-2 Score 0 0     {PHQ-2 LOOK IN ASSESSMENTS (Optional) :074270}  Abuse: Current or Past(Physical, Sexual or Emotional)- {YES/NO/NA:686745}  Do you feel safe in your environment? {YES/NO/NA:114742}        Social History     Tobacco Use     Smoking status: Never Smoker     Smokeless tobacco: Never Used   Substance Use Topics     Alcohol use: Yes     Alcohol/week: 2.0 - 3.0 standard drinks     Types: 2 - 3 Standard drinks or equivalent per week    " " Comment: 2-3 drinks per week avg     If you drink alcohol do you typically have >3 drinks per day or >7 drinks per week? {ETOH :312367}                      Last PSA:   PSA   Date Value Ref Range Status   03/05/2020 0.84 0 - 4 ug/L Final     Comment:     Assay Method:  Chemiluminescence using Siemens Vista analyzer       Reviewed orders with patient. Reviewed health maintenance and updated orders accordingly - {Yes/No:452677::\"Yes\"}  {Chronicprobdata (Optional):972010}    Reviewed and updated as needed this visit by clinical staff                 Reviewed and updated as needed this visit by Provider                {HISTORY OPTIONS (Optional):646320}    ROS:  { :882244::\"CONSTITUTIONAL: NEGATIVE for fever, chills, change in weight\",\"INTEGUMENTARY/SKIN: NEGATIVE for worrisome rashes, moles or lesions\",\"EYES: NEGATIVE for vision changes or irritation\",\"ENT: NEGATIVE for ear, mouth and throat problems\",\"RESP: NEGATIVE for significant cough or SOB\",\"CV: NEGATIVE for chest pain, palpitations or peripheral edema\",\"GI: NEGATIVE for nausea, abdominal pain, heartburn, or change in bowel habits\",\" male: negative for dysuria, hematuria, decreased urinary stream, erectile dysfunction, urethral discharge\",\"MUSCULOSKELETAL: NEGATIVE for significant arthralgias or myalgia\",\"NEURO: NEGATIVE for weakness, dizziness or paresthesias\",\"PSYCHIATRIC: NEGATIVE for changes in mood or affect\"}    OBJECTIVE:   There were no vitals taken for this visit.  EXAM:  {Exam Choices:672324}    {Diagnostic Test Results (Optional):464000::\"Diagnostic Test Results:\",\"Labs reviewed in Epic\"}    ASSESSMENT/PLAN:   {Diag Picklist:314534}    Patient has been advised of split billing requirements and indicates understanding: {YES / NO:293209::\"Yes\"}  COUNSELING:  {MALE COUNSELING MESSAGES:432594::\"Reviewed preventive health counseling, as reflected in patient instructions\"}    Estimated body mass index is 27.02 kg/m  as calculated from the following:    " "Height as of 3/5/20: 1.753 m (5' 9\").    Weight as of 3/5/20: 83 kg (183 lb).    {Weight Management Plan (ACO) Complete if BMI is abnormal-  Ages 18-64  BMI >24.9.  Age 65+ with BMI <23 or >30 (Optional):980119}    He reports that he has never smoked. He has never used smokeless tobacco.      Counseling Resources:  ATP IV Guidelines  Pooled Cohorts Equation Calculator  FRAX Risk Assessment  ICSI Preventive Guidelines  Dietary Guidelines for Americans, 2010  USDA's MyPlate  ASA Prophylaxis  Lung CA Screening    Reza Lynn MD  [facility]  "

## 2021-04-30 DIAGNOSIS — Z00.00 ROUTINE GENERAL MEDICAL EXAMINATION AT A HEALTH CARE FACILITY: ICD-10-CM

## 2021-04-30 DIAGNOSIS — E78.5 HYPERLIPIDEMIA LDL GOAL <130: ICD-10-CM

## 2021-04-30 DIAGNOSIS — E03.8 SUBCLINICAL HYPOTHYROIDISM: ICD-10-CM

## 2021-04-30 DIAGNOSIS — Z12.5 SCREENING FOR PROSTATE CANCER: ICD-10-CM

## 2021-04-30 DIAGNOSIS — I49.3 PVC (PREMATURE VENTRICULAR CONTRACTION): ICD-10-CM

## 2021-04-30 DIAGNOSIS — Z51.81 MEDICATION MONITORING ENCOUNTER: ICD-10-CM

## 2021-04-30 LAB
ALBUMIN SERPL-MCNC: 3.8 G/DL (ref 3.4–5)
ALBUMIN UR-MCNC: NEGATIVE MG/DL
ALP SERPL-CCNC: 70 U/L (ref 40–150)
ALT SERPL W P-5'-P-CCNC: 30 U/L (ref 0–70)
ANION GAP SERPL CALCULATED.3IONS-SCNC: 1 MMOL/L (ref 3–14)
APPEARANCE UR: CLEAR
AST SERPL W P-5'-P-CCNC: 21 U/L (ref 0–45)
BILIRUB SERPL-MCNC: 1.5 MG/DL (ref 0.2–1.3)
BILIRUB UR QL STRIP: NEGATIVE
BUN SERPL-MCNC: 18 MG/DL (ref 7–30)
CALCIUM SERPL-MCNC: 8.8 MG/DL (ref 8.5–10.1)
CHLORIDE SERPL-SCNC: 109 MMOL/L (ref 94–109)
CHOLEST SERPL-MCNC: 173 MG/DL
CK SERPL-CCNC: 96 U/L (ref 30–300)
CO2 SERPL-SCNC: 31 MMOL/L (ref 20–32)
COLOR UR AUTO: YELLOW
CREAT SERPL-MCNC: 1.11 MG/DL (ref 0.66–1.25)
CREAT UR-MCNC: 115 MG/DL
ERYTHROCYTE [DISTWIDTH] IN BLOOD BY AUTOMATED COUNT: 12.5 % (ref 10–15)
GFR SERPL CREATININE-BSD FRML MDRD: 70 ML/MIN/{1.73_M2}
GLUCOSE SERPL-MCNC: 92 MG/DL (ref 70–99)
GLUCOSE UR STRIP-MCNC: NEGATIVE MG/DL
HCT VFR BLD AUTO: 44.6 % (ref 40–53)
HDLC SERPL-MCNC: 41 MG/DL
HGB BLD-MCNC: 14.7 G/DL (ref 13.3–17.7)
HGB UR QL STRIP: NEGATIVE
KETONES UR STRIP-MCNC: NEGATIVE MG/DL
LDLC SERPL CALC-MCNC: 105 MG/DL
LEUKOCYTE ESTERASE UR QL STRIP: NEGATIVE
MCH RBC QN AUTO: 30.8 PG (ref 26.5–33)
MCHC RBC AUTO-ENTMCNC: 33 G/DL (ref 31.5–36.5)
MCV RBC AUTO: 94 FL (ref 78–100)
MICROALBUMIN UR-MCNC: <5 MG/L
MICROALBUMIN/CREAT UR: NORMAL MG/G CR (ref 0–17)
NITRATE UR QL: NEGATIVE
NONHDLC SERPL-MCNC: 132 MG/DL
PH UR STRIP: 7 PH (ref 5–7)
PLATELET # BLD AUTO: 164 10E9/L (ref 150–450)
POTASSIUM SERPL-SCNC: 4.3 MMOL/L (ref 3.4–5.3)
PROT SERPL-MCNC: 7.3 G/DL (ref 6.8–8.8)
PSA SERPL-ACNC: 0.59 UG/L (ref 0–4)
RBC # BLD AUTO: 4.77 10E12/L (ref 4.4–5.9)
SODIUM SERPL-SCNC: 141 MMOL/L (ref 133–144)
SOURCE: NORMAL
SP GR UR STRIP: 1.02 (ref 1–1.03)
T4 FREE SERPL-MCNC: 0.89 NG/DL (ref 0.76–1.46)
TRIGL SERPL-MCNC: 135 MG/DL
TSH SERPL DL<=0.005 MIU/L-ACNC: 2.96 MU/L (ref 0.4–4)
UROBILINOGEN UR STRIP-ACNC: 0.2 EU/DL (ref 0.2–1)
WBC # BLD AUTO: 6.4 10E9/L (ref 4–11)

## 2021-04-30 PROCEDURE — 80053 COMPREHEN METABOLIC PANEL: CPT | Performed by: FAMILY MEDICINE

## 2021-04-30 PROCEDURE — 36415 COLL VENOUS BLD VENIPUNCTURE: CPT | Performed by: FAMILY MEDICINE

## 2021-04-30 PROCEDURE — 81003 URINALYSIS AUTO W/O SCOPE: CPT | Performed by: FAMILY MEDICINE

## 2021-04-30 PROCEDURE — 84443 ASSAY THYROID STIM HORMONE: CPT | Performed by: FAMILY MEDICINE

## 2021-04-30 PROCEDURE — 84439 ASSAY OF FREE THYROXINE: CPT | Performed by: FAMILY MEDICINE

## 2021-04-30 PROCEDURE — 85027 COMPLETE CBC AUTOMATED: CPT | Performed by: FAMILY MEDICINE

## 2021-04-30 PROCEDURE — 82550 ASSAY OF CK (CPK): CPT | Performed by: FAMILY MEDICINE

## 2021-04-30 PROCEDURE — G0103 PSA SCREENING: HCPCS | Performed by: FAMILY MEDICINE

## 2021-04-30 PROCEDURE — 80061 LIPID PANEL: CPT | Performed by: FAMILY MEDICINE

## 2021-04-30 PROCEDURE — 82043 UR ALBUMIN QUANTITATIVE: CPT | Performed by: FAMILY MEDICINE

## 2021-05-03 DIAGNOSIS — D12.6 ADENOMATOUS POLYP OF COLON, UNSPECIFIED PART OF COLON: ICD-10-CM

## 2021-05-03 DIAGNOSIS — Z00.00 ROUTINE GENERAL MEDICAL EXAMINATION AT A HEALTH CARE FACILITY: ICD-10-CM

## 2021-05-03 DIAGNOSIS — Z12.11 SCREEN FOR COLON CANCER: ICD-10-CM

## 2021-05-03 PROCEDURE — 82274 ASSAY TEST FOR BLOOD FECAL: CPT | Performed by: FAMILY MEDICINE

## 2021-05-05 ENCOUNTER — IMMUNIZATION (OUTPATIENT)
Dept: NURSING | Facility: CLINIC | Age: 61
End: 2021-05-05
Attending: INTERNAL MEDICINE
Payer: COMMERCIAL

## 2021-05-05 PROCEDURE — 91300 PR COVID VAC PFIZER DIL RECON 30 MCG/0.3 ML IM: CPT

## 2021-05-05 PROCEDURE — 0002A PR COVID VAC PFIZER DIL RECON 30 MCG/0.3 ML IM: CPT

## 2021-05-09 LAB — HEMOCCULT STL QL IA: NEGATIVE

## 2021-10-24 ENCOUNTER — HEALTH MAINTENANCE LETTER (OUTPATIENT)
Age: 61
End: 2021-10-24

## 2022-05-09 ASSESSMENT — ENCOUNTER SYMPTOMS
JOINT SWELLING: 0
SHORTNESS OF BREATH: 0
FEVER: 0
NERVOUS/ANXIOUS: 0
WEAKNESS: 0
ABDOMINAL PAIN: 0
HEADACHES: 0
ARTHRALGIAS: 1
DYSURIA: 0
NAUSEA: 0
MYALGIAS: 0
HEMATURIA: 0
SORE THROAT: 0
DIARRHEA: 0
EYE PAIN: 0
CHILLS: 0
DIZZINESS: 0
PARESTHESIAS: 0
HEMATOCHEZIA: 0
HEARTBURN: 0
CONSTIPATION: 0
PALPITATIONS: 0
COUGH: 0
FREQUENCY: 0

## 2022-05-11 ENCOUNTER — OFFICE VISIT (OUTPATIENT)
Dept: FAMILY MEDICINE | Facility: CLINIC | Age: 62
End: 2022-05-11
Payer: COMMERCIAL

## 2022-05-11 VITALS
OXYGEN SATURATION: 98 % | HEIGHT: 69 IN | WEIGHT: 184 LBS | RESPIRATION RATE: 16 BRPM | SYSTOLIC BLOOD PRESSURE: 122 MMHG | TEMPERATURE: 97.9 F | BODY MASS INDEX: 27.25 KG/M2 | HEART RATE: 64 BPM | DIASTOLIC BLOOD PRESSURE: 78 MMHG

## 2022-05-11 DIAGNOSIS — I49.3 PVC (PREMATURE VENTRICULAR CONTRACTION): ICD-10-CM

## 2022-05-11 DIAGNOSIS — Z12.11 SCREEN FOR COLON CANCER: ICD-10-CM

## 2022-05-11 DIAGNOSIS — Z91.09 ENVIRONMENTAL ALLERGIES: ICD-10-CM

## 2022-05-11 DIAGNOSIS — Z00.00 ROUTINE GENERAL MEDICAL EXAMINATION AT A HEALTH CARE FACILITY: Primary | ICD-10-CM

## 2022-05-11 DIAGNOSIS — D12.6 ADENOMATOUS POLYP OF COLON, UNSPECIFIED PART OF COLON: ICD-10-CM

## 2022-05-11 DIAGNOSIS — Z23 HIGH PRIORITY FOR 2019-NCOV VACCINE: ICD-10-CM

## 2022-05-11 DIAGNOSIS — E78.5 HYPERLIPIDEMIA LDL GOAL <130: ICD-10-CM

## 2022-05-11 DIAGNOSIS — Z51.81 MEDICATION MONITORING ENCOUNTER: ICD-10-CM

## 2022-05-11 DIAGNOSIS — H40.9 GLAUCOMA OF BOTH EYES, UNSPECIFIED GLAUCOMA TYPE: ICD-10-CM

## 2022-05-11 DIAGNOSIS — E03.8 SUBCLINICAL HYPOTHYROIDISM: ICD-10-CM

## 2022-05-11 DIAGNOSIS — Z12.5 SCREENING FOR PROSTATE CANCER: ICD-10-CM

## 2022-05-11 LAB
ALBUMIN SERPL-MCNC: 3.8 G/DL (ref 3.4–5)
ALBUMIN UR-MCNC: NEGATIVE MG/DL
ALP SERPL-CCNC: 56 U/L (ref 40–150)
ALT SERPL W P-5'-P-CCNC: 31 U/L (ref 0–70)
ANION GAP SERPL CALCULATED.3IONS-SCNC: 7 MMOL/L (ref 3–14)
APPEARANCE UR: CLEAR
AST SERPL W P-5'-P-CCNC: 20 U/L (ref 0–45)
BILIRUB SERPL-MCNC: 1.6 MG/DL (ref 0.2–1.3)
BILIRUB UR QL STRIP: NEGATIVE
BUN SERPL-MCNC: 21 MG/DL (ref 7–30)
CALCIUM SERPL-MCNC: 9.1 MG/DL (ref 8.5–10.1)
CHLORIDE BLD-SCNC: 111 MMOL/L (ref 94–109)
CHOLEST SERPL-MCNC: 170 MG/DL
CK SERPL-CCNC: 212 U/L (ref 30–300)
CO2 SERPL-SCNC: 24 MMOL/L (ref 20–32)
COLOR UR AUTO: YELLOW
CREAT SERPL-MCNC: 1.08 MG/DL (ref 0.66–1.25)
CREAT UR-MCNC: 113 MG/DL
ERYTHROCYTE [DISTWIDTH] IN BLOOD BY AUTOMATED COUNT: 12.6 % (ref 10–15)
FASTING STATUS PATIENT QL REPORTED: YES
GFR SERPL CREATININE-BSD FRML MDRD: 77 ML/MIN/1.73M2
GLUCOSE BLD-MCNC: 95 MG/DL (ref 70–99)
GLUCOSE UR STRIP-MCNC: NEGATIVE MG/DL
HCT VFR BLD AUTO: 43.9 % (ref 40–53)
HDLC SERPL-MCNC: 39 MG/DL
HGB BLD-MCNC: 14.5 G/DL (ref 13.3–17.7)
HGB UR QL STRIP: ABNORMAL
KETONES UR STRIP-MCNC: NEGATIVE MG/DL
LDLC SERPL CALC-MCNC: 109 MG/DL
LEUKOCYTE ESTERASE UR QL STRIP: NEGATIVE
MCH RBC QN AUTO: 31.3 PG (ref 26.5–33)
MCHC RBC AUTO-ENTMCNC: 33 G/DL (ref 31.5–36.5)
MCV RBC AUTO: 95 FL (ref 78–100)
MICROALBUMIN UR-MCNC: 6 MG/L
MICROALBUMIN/CREAT UR: 5.31 MG/G CR (ref 0–17)
NITRATE UR QL: NEGATIVE
NONHDLC SERPL-MCNC: 131 MG/DL
PH UR STRIP: 5 [PH] (ref 5–7)
PLATELET # BLD AUTO: 175 10E3/UL (ref 150–450)
POTASSIUM BLD-SCNC: 4.1 MMOL/L (ref 3.4–5.3)
PROT SERPL-MCNC: 7.3 G/DL (ref 6.8–8.8)
PSA SERPL-MCNC: 0.53 UG/L (ref 0–4)
RBC # BLD AUTO: 4.63 10E6/UL (ref 4.4–5.9)
RBC #/AREA URNS AUTO: NORMAL /HPF
SODIUM SERPL-SCNC: 142 MMOL/L (ref 133–144)
SP GR UR STRIP: 1.02 (ref 1–1.03)
T4 FREE SERPL-MCNC: 0.98 NG/DL (ref 0.76–1.46)
TRIGL SERPL-MCNC: 110 MG/DL
TSH SERPL DL<=0.005 MIU/L-ACNC: 3.17 MU/L (ref 0.4–4)
UROBILINOGEN UR STRIP-ACNC: 0.2 E.U./DL
WBC # BLD AUTO: 4.5 10E3/UL (ref 4–11)
WBC #/AREA URNS AUTO: NORMAL /HPF

## 2022-05-11 PROCEDURE — 80061 LIPID PANEL: CPT | Performed by: FAMILY MEDICINE

## 2022-05-11 PROCEDURE — 82550 ASSAY OF CK (CPK): CPT | Performed by: FAMILY MEDICINE

## 2022-05-11 PROCEDURE — 0064A COVID-19,PF,MODERNA (18+ YRS BOOSTER .25ML): CPT | Performed by: FAMILY MEDICINE

## 2022-05-11 PROCEDURE — 84443 ASSAY THYROID STIM HORMONE: CPT | Performed by: FAMILY MEDICINE

## 2022-05-11 PROCEDURE — 84439 ASSAY OF FREE THYROXINE: CPT | Performed by: FAMILY MEDICINE

## 2022-05-11 PROCEDURE — 85027 COMPLETE CBC AUTOMATED: CPT | Performed by: FAMILY MEDICINE

## 2022-05-11 PROCEDURE — G0103 PSA SCREENING: HCPCS | Performed by: FAMILY MEDICINE

## 2022-05-11 PROCEDURE — 91306 COVID-19,PF,MODERNA (18+ YRS BOOSTER .25ML): CPT | Performed by: FAMILY MEDICINE

## 2022-05-11 PROCEDURE — 80053 COMPREHEN METABOLIC PANEL: CPT | Performed by: FAMILY MEDICINE

## 2022-05-11 PROCEDURE — 81001 URINALYSIS AUTO W/SCOPE: CPT | Performed by: FAMILY MEDICINE

## 2022-05-11 PROCEDURE — 36415 COLL VENOUS BLD VENIPUNCTURE: CPT | Performed by: FAMILY MEDICINE

## 2022-05-11 PROCEDURE — 82043 UR ALBUMIN QUANTITATIVE: CPT | Performed by: FAMILY MEDICINE

## 2022-05-11 PROCEDURE — 99396 PREV VISIT EST AGE 40-64: CPT | Mod: 25 | Performed by: FAMILY MEDICINE

## 2022-05-11 RX ORDER — PRAVASTATIN SODIUM 20 MG
20 TABLET ORAL DAILY
Qty: 90 TABLET | Refills: 3 | Status: SHIPPED | OUTPATIENT
Start: 2022-05-11 | End: 2023-05-15

## 2022-05-11 RX ORDER — LEVOTHYROXINE SODIUM 50 UG/1
50 TABLET ORAL DAILY
Qty: 90 TABLET | Refills: 3 | Status: SHIPPED | OUTPATIENT
Start: 2022-05-11 | End: 2023-05-15

## 2022-05-11 NOTE — PROGRESS NOTES
[facility]  [place]    SUBJECTIVE    CC: Tavo Johnson is an 61 year old male who presents for preventative health visit.     Patient has been advised of split billing requirements and indicates understanding: Yes     Healthy Habits:     Getting at least 3 servings of Calcium per day:  Yes    Bi-annual eye exam:  Yes    Dental care twice a year:  Yes    Sleep apnea or symptoms of sleep apnea:  Daytime drowsiness    Diet:  Regular (no restrictions)    Frequency of exercise:  2-3 days/week    Duration of exercise:  30-45 minutes    Taking medications regularly:  Yes    Medication side effects:  None    PHQ-2 Total Score: 0    Additional concerns today:  No    Today's PHQ-2 Score:   PHQ-2 ( 1999 Pfizer) 5/9/2022   Q1: Little interest or pleasure in doing things 0   Q2: Feeling down, depressed or hopeless 0   PHQ-2 Score 0   PHQ-2 Total Score (12-17 Years)- Positive if 3 or more points; Administer PHQ-A if positive -   Q1: Little interest or pleasure in doing things Not at all   Q2: Feeling down, depressed or hopeless Not at all   PHQ-2 Score 0     Abuse: Current or Past(Physical, Sexual or Emotional)- No  Do you feel safe in your environment? Yes    Social History     Tobacco Use     Smoking status: Never Smoker     Smokeless tobacco: Never Used   Substance Use Topics     Alcohol use: Yes     Types: 2 - 3 Standard drinks or equivalent per week     Comment: 2-3 drinks per week avg     If you drink alcohol do you typically have >3 drinks per day or >7 drinks per week? No    Last PSA:   PSA   Date Value Ref Range Status   04/30/2021 0.59 0 - 4 ug/L Final     Comment:     Assay Method:  Chemiluminescence using Siemens Vista analyzer       Reviewed orders with patient. Reviewed health maintenance and updated orders accordingly - Yes    Lipids    Recent Labs   Lab Test 04/30/21  0828 03/05/20  0747 02/12/16  0811 01/30/15  0815   CHOL 173 170   < > 162   HDL 41 39*   < > 44   * 106*   < > 89   TRIG 135 123    < > 143   CHOLHDLRATIO  --   --   --  3.7    < > = values in this interval not displayed.     Hypothyroid    TSH   Date Value Ref Range Status   04/30/2021 2.96 0.40 - 4.00 mU/L Final     Environmental allergies - allegra in springtime    PVC's - no issues    Glaucoma - [facility] - [facility]onaldo Nam    Health Maintenance     Colonoscopy:  Due 11/2024   FIT:  given              PSA:  pending   DEXA:  NA    Health Maintenance Due   Topic Date Due     Pneumococcal Vaccine: Pediatrics (0 to 5 Years) and At-Risk Patients (6 to 64 Years) (2 - PCV) 02/28/2020     LIPID  04/30/2022     T4 FREE  04/30/2022     PSA  04/30/2022     TSH W/FREE T4 REFLEX  04/30/2022       Current Problem List    Patient Active Problem List   Diagnosis     Seasonal allergies     Adenomatous polyp of colon     Subclinical hypothyroidism     Hyperlipidemia LDL goal <130     Advanced directives, counseling/discussion     PVC (premature ventricular contraction)     Environmental allergies     Glaucoma       Past Medical History    Past Medical History:   Diagnosis Date     Adenomatous polyp of colon 9/09, 10/14    tubular - due 5 yrs     Environmental allergies     seasonal - spring     Glaucoma 05/2005    [facility] - bilateral     Hyperlipidemia LDL goal <130     borderline     PVC (premature ventricular contraction) 12/15     Subclinical hypothyroidism 9/09       Past Surgical History    Past Surgical History:   Procedure Laterality Date     COLONOSCOPY  9/09, 10/14, 11/19    History of  tubular adenomatous polyps - due 5 yrs - internal hemorrhoids     HERNIA REPAIR  1987     ORTHOPEDIC SURGERY  1996     STRESS ECHO (METRO)  3/07, 10/09, 12/11, 1/14    normal     SURGICAL HISTORY OF -  Bilateral 06/1987    HERNIA - JUNIOR     SURGICAL HISTORY OF -  Right 1996    RT SHOULDER - Dr Juline       Current Medications    Current Outpatient Medications   Medication Sig Dispense Refill     ASPIRIN 81 MG OR TABS ONE DAILY 100 3     brimonidine-timolol  (COMBIGAN) 0.2-0.5 % ophthalmic solution        levothyroxine (SYNTHROID/LEVOTHROID) 50 MCG tablet Take 1 tablet (50 mcg) by mouth daily 90 tablet 3     pravastatin (PRAVACHOL) 20 MG tablet Take 1 tablet (20 mg) by mouth daily 90 tablet 3     XALATAN 0.005 % OP SOLN None Entered         Allergies    Allergies   Allergen Reactions     Amoxicillin Rash     rash     Azithromycin Tinnitus     ?       Immunizations    Immunization History   Administered Date(s) Administered     COVID-19,PF,Moderna 11/21/2021     COVID-19,PF,Moderna Booster 05/11/2022     COVID-19,PF,Pfizer (12+ Yrs) 04/14/2021, 05/05/2021     Influenza (IIV3) PF 11/01/2014     Pneumococcal 23 valent 02/28/2019     TD (ADULT, 7+) 11/18/1996     TDAP Vaccine (Adacel) 02/23/2007     Tdap (Adacel,Boostrix) 09/26/2015     Twinrix A/B 12/31/2013, 02/06/2014, 07/05/2014     Zoster vaccine recombinant adjuvanted (SHINGRIX) 07/17/2021, 12/29/2021       Family History    Family History   Problem Relation Age of Onset     C.A.D. Father 61        age 61     Thyroid Disease Father      C.A.D. Brother 42        age 42     No Known Problems Mother      Coronary Artery Disease Sister      No Known Problems Sister      Cancer Paternal Grandfather         stomach ca     Cancer Paternal Grandmother         stomach ca     Pancreatic Cancer Cousin         1st cousin     Depression Brother      Mental Illness Brother      No Known Problems Maternal Grandmother      No Known Problems Maternal Grandfather        Social History    Social History     Socioeconomic History     Marital status:      Spouse name: Maribell     Number of children: 3     Years of education: 16     Highest education level: Not on file   Occupational History     Employer: [employer]   Tobacco Use     Smoking status: Never Smoker     Smokeless tobacco: Never Used   Vaping Use     Vaping Use: Never used   Substance and Sexual Activity     Alcohol use: Yes     Types: 2 - 3 Standard drinks or  equivalent per week     Comment: 2-3 drinks per week avg     Drug use: No     Sexual activity: Yes     Partners: Female     Birth control/protection: Male Surgical   Other Topics Concern      Service No     Blood Transfusions No     Caffeine Concern Yes     Comment: 1-2 cups qd     Occupational Exposure No     Hobby Hazards No     Sleep Concern No     Stress Concern No     Weight Concern No     Special Diet Not Asked     Back Care Not Asked     Exercise Yes     Comment: lifts and treadmill - Wii Fit - [employer] - 2-3/week     Bike Helmet Not Asked     Seat Belt Yes     Self-Exams Not Asked     Parent/sibling w/ CABG, MI or angioplasty before 65F 55M? Yes   Social History Narrative     Not on file     Social Determinants of Health     Financial Resource Strain: Not on file   Food Insecurity: Not on file   Transportation Needs: Not on file   Physical Activity: Not on file   Stress: Not on file   Social Connections: Not on file   Intimate Partner Violence: Not on file   Housing Stability: Not on file       ROS    CONSTITUTIONAL: NEGATIVE for fever, chills, change in weight  INTEGUMENTARY/SKIN: NEGATIVE for worrisome rashes, moles or lesions  EYES: NEGATIVE for vision changes or irritation  ENT/MOUTH: NEGATIVE for ear, mouth and throat problems  RESP: NEGATIVE for significant cough or SOB  BREAST: NEGATIVE for masses, tenderness or discharge  CV: NEGATIVE for chest pain, palpitations or peripheral edema  GI: NEGATIVE for nausea, abdominal pain, heartburn, or change in bowel habits  : NEGATIVE for frequency, dysuria, or hematuria  MUSCULOSKELETAL: NEGATIVE for significant arthralgias or myalgia  NEURO: NEGATIVE for weakness, dizziness or paresthesias  ENDOCRINE: NEGATIVE for temperature intolerance, skin/hair changes  HEME: NEGATIVE for bleeding problems  PSYCHIATRIC: NEGATIVE for changes in mood or affect    OBJECTIVE    /78   Pulse 64   Temp 97.9  F (36.6  C) (Tympanic)   Resp 16   Ht 1.753 m (5'  "9\")   Wt 83.5 kg (184 lb)   SpO2 98%   BMI 27.17 kg/m      EXAM:    GENERAL: healthy, alert and no distress  EYES: Eyes grossly normal to inspection, PERRL and conjunctivae and sclerae normal  HENT: ear canals and TM's normal, nose and mouth without ulcers or lesions  NECK: no adenopathy, no asymmetry, masses, or scars and thyroid normal to palpation  RESP: lungs clear to auscultation - no rales, rhonchi or wheezes  CV: regular rate and rhythm, normal S1 S2, no S3 or S4, no murmur, click or rub, no peripheral edema and peripheral pulses strong  ABDOMEN: soft, nontender, no hepatosplenomegaly, no masses and bowel sounds normal   (male): testicles normal without atrophy or masses, no hernias and penis normal without urethral discharge  RECTAL: normal sphincter tone, no rectal masses and prostate of trace increased size for age, smooth, nontender without masses/nodules  MS: no gross musculoskeletal defects noted, no edema  SKIN: no suspicious lesions or rashes  NEURO: Normal strength and tone, mentation intact and speech normal  PSYCH: mentation appears normal, affect normal/bright  LYMPH: no cervical, supraclavicular, axillary, or inguinal adenopathy    DIAGNOSTICS/PROCEDURES    Pending    ASSESSMENT      ICD-10-CM    1. Routine general medical examination at a health care facility  Z00.00 Comprehensive metabolic panel     Lipid panel reflex to direct LDL Fasting     CBC with platelets     CK total     UA reflex to Microscopic and Culture     Albumin Random Urine Quantitative with Creat Ratio     Prostate Specific Antigen Screen     Fecal colorectal cancer screen FIT     TSH     T4, free     REVIEW OF HEALTH MAINTENANCE PROTOCOL ORDERS     Comprehensive metabolic panel     Lipid panel reflex to direct LDL Fasting     CBC with platelets     CK total     UA reflex to Microscopic and Culture     Albumin Random Urine Quantitative with Creat Ratio     Prostate Specific Antigen Screen     TSH     T4, free     Urine " Microscopic   2. Hyperlipidemia LDL goal <130  E78.5 Comprehensive metabolic panel     Lipid panel reflex to direct LDL Fasting     CK total     REVIEW OF HEALTH MAINTENANCE PROTOCOL ORDERS     Comprehensive metabolic panel     Lipid panel reflex to direct LDL Fasting     CK total     pravastatin (PRAVACHOL) 20 MG tablet   3. Subclinical hypothyroidism  E03.8 TSH     T4, free     REVIEW OF HEALTH MAINTENANCE PROTOCOL ORDERS     TSH     T4, free     levothyroxine (SYNTHROID/LEVOTHROID) 50 MCG tablet   4. Environmental allergies  Z91.09 REVIEW OF HEALTH MAINTENANCE PROTOCOL ORDERS   5. PVC (premature ventricular contraction)  I49.3 REVIEW OF HEALTH MAINTENANCE PROTOCOL ORDERS   6. Glaucoma of both eyes, unspecified glaucoma type  H40.9 REVIEW OF HEALTH MAINTENANCE PROTOCOL ORDERS   7. Adenomatous polyp of colon, unspecified part of colon  D12.6 Fecal colorectal cancer screen FIT     REVIEW OF HEALTH MAINTENANCE PROTOCOL ORDERS   8. Screen for colon cancer  Z12.11 Fecal colorectal cancer screen FIT     REVIEW OF HEALTH MAINTENANCE PROTOCOL ORDERS   9. Screening for prostate cancer  Z12.5 Prostate Specific Antigen Screen     REVIEW OF HEALTH MAINTENANCE PROTOCOL ORDERS     Prostate Specific Antigen Screen   10. Medication monitoring encounter  Z51.81 Comprehensive metabolic panel     Lipid panel reflex to direct LDL Fasting     CBC with platelets     CK total     UA reflex to Microscopic and Culture     Albumin Random Urine Quantitative with Creat Ratio     TSH     T4, free     REVIEW OF HEALTH MAINTENANCE PROTOCOL ORDERS     Comprehensive metabolic panel     Lipid panel reflex to direct LDL Fasting     CBC with platelets     CK total     UA reflex to Microscopic and Culture     Albumin Random Urine Quantitative with Creat Ratio     TSH     T4, free     Urine Microscopic   11. High priority for 2019-nCoV vaccine  Z23 COVID-19,PF,MODERNA (18+ Yrs BOOSTER .25mL)       PLAN    Discussed treatment/modality options,  "including risk and benefits, he desires:    advised alcohol consumption 1oz per day or less, advised 1 multivitamin per day, advised calcium 5587-0649 mg/d and Vitamin D 800-1200 IU/d, advised dentist every 6 months, advised diet and exercise, advised opthalmologist every 3-4 months, advised self testicular exam q month, further health care maintenance, further lab(s), immunization(s), medication refill(s) and observation    Discussed controversies surrounding PSA. Specifically reviewed 2017 USPSTF findings recommending discussion of PSA testing for men ages 55-69.  Reviewed findings of the  Randomized Study of Screening for Prostate Cancer which showed a 30% reduction in advanced stage prostate cancer and a 20% reduction in death rate from prostate cancer in this age group. PSA-based screening may prevent up to 2 deaths and up to 3 cases of metastatic disease per 1,000 men screened over 13 years.    We've elected to do PSA this year after discussing these controversies.    All diagnosis above reviewed and noted above, otherwise stable.      See [employer] orders for further details.      1) med refills    2) labs pending    3) Moderna - booster #2    4) [facility]    Return in about 1 year (around 5/11/2023) for Complete Physical, Medication Recheck Visit.    Health Maintenance Due   Topic Date Due     Pneumococcal Vaccine: Pediatrics (0 to 5 Years) and At-Risk Patients (6 to 64 Years) (2 - PCV) 02/28/2020     LIPID  04/30/2022     T4 FREE  04/30/2022     PSA  04/30/2022     TSH W/FREE T4 REFLEX  04/30/2022       COUNSELING    Reviewed preventive health counseling, as reflected in patient instructions    BP Readings from Last 1 Encounters:   05/11/22 122/78     Estimated body mass index is 27.17 kg/m  as calculated from the following:    Height as of this encounter: 1.753 m (5' 9\").    Weight as of this encounter: 83.5 kg (184 lb).           reports that he has never smoked. He has never used smokeless " tobacco.      Counseling Resources:    ATP IV Guidelines  Pooled Cohorts Equation Calculator  FRAX Risk Assessment  ICSI Preventive Guidelines  Dietary Guidelines for Americans, 2010  [employer]'s MyPlate  ASA Prophylaxis  Lung CA Screening           Reza Lynn MD, FAAFP     [facility]  [address] [place] [id number]  [email or web address]   Office: [phone]  Fax: [phone]  Pager: [phone]     Answers for HPI/ROS submitted by the patient on 5/9/2022  abdominal pain: No  Blood in stool: No  Blood in urine: No  chest pain: No  chills: No  congestion: No  constipation: No  cough: No  diarrhea: No  dizziness: No  ear pain: No  eye pain: No  nervous/anxious: No  fever: No  frequency: No  genital sores: No  headaches: No  hearing loss: Yes  heartburn: No  arthralgias: Yes  joint swelling: No  peripheral edema: No  mood changes: No  myalgias: No  nausea: No  dysuria: No  palpitations: No  Skin sensation changes: No  sore throat: No  urgency: No  rash: No  shortness of breath: No  visual disturbance: No  weakness: No  impotence: No  penile discharge: No

## 2022-05-11 NOTE — LETTER
May 12, 2022      Tavo ACEVEDO Alex  [address] [facility] [place] [address]        Dear ,    We are writing to inform you of your test results.    Labs are overall quite good     We advise:     Continue current cares.   Balanced low cholesterol diet.   Regular exercise.     For additional lab test information, [email or web address] is an excellent reference.       Resulted Orders   Comprehensive metabolic panel   Result Value Ref Range    Sodium 142 133 - 144 mmol/L    Potassium 4.1 3.4 - 5.3 mmol/L    Chloride 111 (H) 94 - 109 mmol/L    Carbon Dioxide (CO2) 24 20 - 32 mmol/L    Anion Gap 7 3 - 14 mmol/L    Urea Nitrogen 21 7 - 30 mg/dL    Creatinine 1.08 0.66 - 1.25 mg/dL    Calcium 9.1 8.5 - 10.1 mg/dL    Glucose 95 70 - 99 mg/dL    Alkaline Phosphatase 56 40 - 150 U/L    AST 20 0 - 45 U/L    ALT 31 0 - 70 U/L    Protein Total 7.3 6.8 - 8.8 g/dL    Albumin 3.8 3.4 - 5.0 g/dL    Bilirubin Total 1.6 (H) 0.2 - 1.3 mg/dL    GFR Estimate 77 >60 mL/min/1.73m2      Comment:      Effective December 21, 2021 eGFRcr in adults is calculated using the 2021 CKD-EPI creatinine equation which includes age and gender (Maninder et al., NEJ, DOI: 10.1056/GJALxy1806705)   Lipid panel reflex to direct LDL Fasting   Result Value Ref Range    Cholesterol 170 <200 mg/dL    Triglycerides 110 <150 mg/dL    Direct Measure HDL 39 (L) >=40 mg/dL    LDL Cholesterol Calculated 109 (H) <=100 mg/dL    Non HDL Cholesterol 131 (H) <130 mg/dL    Patient Fasting > 8hrs? Yes     Narrative    Cholesterol  Desirable:  <200 mg/dL    Triglycerides  Normal:  Less than 150 mg/dL  Borderline High:  150-199 mg/dL  High:  200-499 mg/dL  Very High:  Greater than or equal to 500 mg/dL    Direct Measure HDL  Female:  Greater than or equal to 50 mg/dL   Male:  Greater than or equal to 40 mg/dL    LDL Cholesterol  Desirable:  <100mg/dL  Above Desirable:  100-129 mg/dL   Borderline High:  130-159 mg/dL   High:  160-189 mg/dL   Very High:  >= 190  mg/dL    Non HDL Cholesterol  Desirable:  130 mg/dL  Above Desirable:  130-159 mg/dL  Borderline High:  160-189 mg/dL  High:  190-219 mg/dL  Very High:  Greater than or equal to 220 mg/dL   CBC with platelets   Result Value Ref Range    WBC Count 4.5 4.0 - 11.0 10e3/uL    RBC Count 4.63 4.40 - 5.90 10e6/uL    Hemoglobin 14.5 13.3 - 17.7 g/dL    Hematocrit 43.9 40.0 - 53.0 %    MCV 95 78 - 100 fL    MCH 31.3 26.5 - 33.0 pg    MCHC 33.0 31.5 - 36.5 g/dL    RDW 12.6 10.0 - 15.0 %    Platelet Count 175 150 - 450 10e3/uL   CK total   Result Value Ref Range     30 - 300 U/L   UA reflex to Microscopic and Culture   Result Value Ref Range    Color Urine Yellow Colorless, Straw, Light Yellow, Yellow    Appearance Urine Clear Clear    Glucose Urine Negative Negative mg/dL    Bilirubin Urine Negative Negative    Ketones Urine Negative Negative mg/dL    Specific Gravity Urine 1.025 1.003 - 1.035    Blood Urine Trace (A) Negative    pH Urine 5.0 5.0 - 7.0    Protein Albumin Urine Negative Negative mg/dL    Urobilinogen Urine 0.2 0.2, 1.0 E.U./dL    Nitrite Urine Negative Negative    Leukocyte Esterase Urine Negative Negative   Albumin Random Urine Quantitative with Creat Ratio   Result Value Ref Range    Creatinine Urine mg/dL 113 mg/dL    Albumin Urine mg/L 6 mg/L    Albumin Urine mg/g Cr 5.31 0.00 - 17.00 mg/g Cr   Prostate Specific Antigen Screen   Result Value Ref Range    Prostate Specific Antigen Screen 0.53 0.00 - 4.00 ug/L   TSH   Result Value Ref Range    TSH 3.17 0.40 - 4.00 mU/L   T4, free   Result Value Ref Range    Free T4 0.98 0.76 - 1.46 ng/dL   Urine Microscopic   Result Value Ref Range    RBC Urine 0-2 0-2 /HPF /HPF    WBC Urine 0-5 0-5 /HPF /HPF    Narrative    Urine Culture not indicated       If you have any questions or concerns, please call the clinic at the number listed above.       Sincerely,            Reza Lynn M.D./

## 2022-10-16 ENCOUNTER — HEALTH MAINTENANCE LETTER (OUTPATIENT)
Age: 62
End: 2022-10-16

## 2023-02-14 ENCOUNTER — OFFICE VISIT (OUTPATIENT)
Dept: FAMILY MEDICINE | Facility: CLINIC | Age: 63
End: 2023-02-14
Payer: COMMERCIAL

## 2023-02-14 VITALS
HEIGHT: 69 IN | SYSTOLIC BLOOD PRESSURE: 122 MMHG | TEMPERATURE: 97 F | WEIGHT: 184 LBS | HEART RATE: 84 BPM | DIASTOLIC BLOOD PRESSURE: 78 MMHG | OXYGEN SATURATION: 99 % | BODY MASS INDEX: 27.25 KG/M2 | RESPIRATION RATE: 16 BRPM

## 2023-02-14 DIAGNOSIS — M25.561 RIGHT KNEE PAIN, UNSPECIFIED CHRONICITY: Primary | ICD-10-CM

## 2023-02-14 PROCEDURE — 99213 OFFICE O/P EST LOW 20 MIN: CPT | Performed by: FAMILY MEDICINE

## 2023-02-14 NOTE — PROGRESS NOTES
"  Assessment & Plan   Right knee pain, unspecified chronicity  ***      BMI:     Return in about 3 weeks (around 3/7/2023) for symptoms failing to improve or sooner if worsening.      Jonathan Cancino MD      [address] [address]  [employer].Department of Health and Human Services   Office: [phone]       Steven Vo is a 62 year old, presenting for the following health issues:  Knee Injury      HPI       Review of Systems         Objective    /78 (BP Location: Left arm, Patient Position: Chair, Cuff Size: Adult Large)   Pulse 84   Temp 97  F (36.1  C) (Tympanic)   Resp 16   Ht 1.753 m (5' 9\")   Wt 83.5 kg (184 lb)   SpO2 99%   BMI 27.17 kg/m    Body mass index is 27.17 kg/m .  Physical Exam   GENERAL: healthy, alert and no distress  RESP: lungs clear to auscultation - no rales, rhonchi or wheezes  CV: regular rate and rhythm, normal S1 S2, no S3 or S4, no murmur, click or rub, no peripheral edema and peripheral pulses strong  MS:r lateral joint line otherwise  no gross musculoskeletal defects noted, no edema  SKIN: no suspicious lesions or rashes  NEURO: Normal strength and tone, mentation intact and speech normal                    "

## 2023-02-14 NOTE — PROGRESS NOTES
Answers for HPI/ROS submitted by the patient on 2/7/2023  How many servings of fruits and vegetables do you eat daily?: 2-3  On average, how many sweetened beverages do you drink each day (Examples: soda, juice, sweet tea, etc.  Do NOT count diet or artificially sweetened beverages)?: 2  How many minutes a day do you exercise enough to make your heart beat faster?: 10 to 19  How many days a week do you exercise enough to make your heart beat faster?: 3 or less  How many days per week do you miss taking your medication?: 0  What is the reason for your visit today?: Knee injury  When did your symptoms begin?: 1-2 weeks ago  What are your symptoms?: pain  How would you describe these symptoms?: Severe  Are your symptoms:: Improving  Have you had these symptoms before?: No  Is there anything that makes you feel worse?: twisting the knee  Is there anything that makes you feel better?: rest, ibuprofin/acetominophin

## 2023-02-14 NOTE — PROGRESS NOTES
"  Assessment & Plan   Right knee pain, unspecified chronicity  Exam most consistent with a PCL sprain and he is in better.  No signs of ACL or meniscus injuries.  Intact.  Continue to wear brace and range of motion exercises recommended.      BMI:   Estimated body mass index is 27.17 kg/m  as calculated from the following:    Height as of this encounter: 1.753 m (5' 9\").    Weight as of this encounter: 83.5 kg (184 lb).   Weight management plan: Discussed healthy diet and exercise guidelines      Return in about 3 weeks (around 3/7/2023) for symptoms failing to improve or sooner if worsening.      Jonathan Cancino MD      [facility]  [address] [place] [id number]  [employer]   Office: [phone]       Steven Vo is a 62 year old, presenting for the following health issues:  Knee Injury      History of Present Illness     Right knee pain with fall while skiing 2 weeks.  Able to ski off the hill but was done then.  No prior similar symptoms.     Some achiness of the knees chronically.       Reason for visit:  Knee injury  Symptom onset:  1-2 weeks ago  Symptoms include:  Pain  Symptom intensity:  Severe  Symptom progression:  Improving  Had these symptoms before:  No  What makes it worse:  Twisting the knee  What makes it better:  Rest, ibuprofin/acetominophin    He eats 2-3 servings of fruits and vegetables daily.He consumes 2 sweetened beverage(s) daily.He exercises with enough effort to increase his heart rate 10 to 19 minutes per day.  He exercises with enough effort to increase his heart rate 3 or less days per week.   He is taking medications regularly.     Review of Systems         Objective    /78 (BP Location: Left arm, Patient Position: Chair, Cuff Size: Adult Large)   Pulse 84   Temp 97  F (36.1  C) (Tympanic)   Resp 16   Ht 1.753 m (5' 9\")   Wt 83.5 kg (184 lb)   SpO2 99%   BMI 27.17 kg/m    Body mass index is 27.17 kg/m .  Physical Exam "   GENERAL: healthy, alert and no distress  NECK: no adenopathy, no asymmetry, masses, or scars and thyroid normal to palpation  RESP: lungs clear to auscultation - no rales, rhonchi or wheezes  CV: regular rate and rhythm, normal S1 S2, no S3 or S4, no murmur, click or rub, no peripheral edema and peripheral pulses strong  ABDOMEN: soft, nontender, no hepatosplenomegaly, no masses and bowel sounds normal  MS: Right knee has a mild tenderness of the PCL on the sole of the MCL, no laxity of the ligaments x4.negative meniscus testing as well as patellar grind otherwise no gross musculoskeletal defects noted, no edema  SKIN: no suspicious lesions or rashes  NEURO: Normal strength and tone, mentation intact and speech normal

## 2023-02-14 NOTE — LETTER
[facility] PRIOR [address] [id number] [phone]       February 14, 2023    Tavo Johnson  [address] [place] [id number]    To Whom it May Concern:    The above patient was evaluated today for a right knee injury and it is recommended that he not ski for 3 months.   Please contact me with questions or concerns.      Sincerely,          Jonathan Cancino M.D.

## 2023-05-08 ASSESSMENT — ENCOUNTER SYMPTOMS
HEADACHES: 0
JOINT SWELLING: 0
MYALGIAS: 0
SHORTNESS OF BREATH: 0
PARESTHESIAS: 0
PALPITATIONS: 0
NAUSEA: 0
HEARTBURN: 0
CHILLS: 0
CONSTIPATION: 0
SORE THROAT: 0
COUGH: 0
HEMATOCHEZIA: 0
ARTHRALGIAS: 1
HEMATURIA: 0
EYE PAIN: 0
DIARRHEA: 0
ABDOMINAL PAIN: 0
DIZZINESS: 0
FEVER: 0
NERVOUS/ANXIOUS: 0
FREQUENCY: 0
WEAKNESS: 0
DYSURIA: 0

## 2023-05-15 ENCOUNTER — OFFICE VISIT (OUTPATIENT)
Dept: FAMILY MEDICINE | Facility: CLINIC | Age: 63
End: 2023-05-15
Payer: COMMERCIAL

## 2023-05-15 VITALS
RESPIRATION RATE: 16 BRPM | DIASTOLIC BLOOD PRESSURE: 78 MMHG | SYSTOLIC BLOOD PRESSURE: 122 MMHG | HEIGHT: 69 IN | BODY MASS INDEX: 27.91 KG/M2 | HEART RATE: 78 BPM | TEMPERATURE: 97.6 F | OXYGEN SATURATION: 95 % | WEIGHT: 188.4 LBS

## 2023-05-15 DIAGNOSIS — I49.3 PVC (PREMATURE VENTRICULAR CONTRACTION): ICD-10-CM

## 2023-05-15 DIAGNOSIS — Z23 NEED FOR PNEUMOCOCCAL VACCINATION: ICD-10-CM

## 2023-05-15 DIAGNOSIS — E78.5 HYPERLIPIDEMIA LDL GOAL <130: ICD-10-CM

## 2023-05-15 DIAGNOSIS — D12.6 ADENOMATOUS POLYP OF COLON, UNSPECIFIED PART OF COLON: ICD-10-CM

## 2023-05-15 DIAGNOSIS — E03.8 SUBCLINICAL HYPOTHYROIDISM: ICD-10-CM

## 2023-05-15 DIAGNOSIS — Z51.81 MEDICATION MONITORING ENCOUNTER: ICD-10-CM

## 2023-05-15 DIAGNOSIS — Z00.00 ROUTINE GENERAL MEDICAL EXAMINATION AT A HEALTH CARE FACILITY: Primary | ICD-10-CM

## 2023-05-15 DIAGNOSIS — Z12.11 SCREEN FOR COLON CANCER: ICD-10-CM

## 2023-05-15 DIAGNOSIS — R19.7 DIARRHEA, UNSPECIFIED TYPE: ICD-10-CM

## 2023-05-15 DIAGNOSIS — H40.9 GLAUCOMA OF BOTH EYES, UNSPECIFIED GLAUCOMA TYPE: ICD-10-CM

## 2023-05-15 DIAGNOSIS — Z12.5 SCREENING FOR PROSTATE CANCER: ICD-10-CM

## 2023-05-15 DIAGNOSIS — Z91.09 ENVIRONMENTAL ALLERGIES: ICD-10-CM

## 2023-05-15 LAB
ALBUMIN SERPL BCG-MCNC: 4.4 G/DL (ref 3.5–5.2)
ALBUMIN UR-MCNC: NEGATIVE MG/DL
ALP SERPL-CCNC: 75 U/L (ref 40–129)
ALT SERPL W P-5'-P-CCNC: 24 U/L (ref 10–50)
ANION GAP SERPL CALCULATED.3IONS-SCNC: 10 MMOL/L (ref 7–15)
APPEARANCE UR: CLEAR
AST SERPL W P-5'-P-CCNC: 25 U/L (ref 10–50)
BILIRUB SERPL-MCNC: 0.7 MG/DL
BILIRUB UR QL STRIP: NEGATIVE
BUN SERPL-MCNC: 15.5 MG/DL (ref 8–23)
CALCIUM SERPL-MCNC: 9.3 MG/DL (ref 8.8–10.2)
CHLORIDE SERPL-SCNC: 107 MMOL/L (ref 98–107)
CHOLEST SERPL-MCNC: 189 MG/DL
CK SERPL-CCNC: 143 U/L (ref 39–308)
COLOR UR AUTO: YELLOW
CREAT SERPL-MCNC: 1.08 MG/DL (ref 0.67–1.17)
CREAT UR-MCNC: 105 MG/DL
DEPRECATED HCO3 PLAS-SCNC: 23 MMOL/L (ref 22–29)
ERYTHROCYTE [DISTWIDTH] IN BLOOD BY AUTOMATED COUNT: 12.6 % (ref 10–15)
GFR SERPL CREATININE-BSD FRML MDRD: 77 ML/MIN/1.73M2
GLUCOSE SERPL-MCNC: 101 MG/DL (ref 70–99)
GLUCOSE UR STRIP-MCNC: NEGATIVE MG/DL
HCT VFR BLD AUTO: 43.6 % (ref 40–53)
HDLC SERPL-MCNC: 38 MG/DL
HGB BLD-MCNC: 14.4 G/DL (ref 13.3–17.7)
HGB UR QL STRIP: NEGATIVE
KETONES UR STRIP-MCNC: NEGATIVE MG/DL
LDLC SERPL CALC-MCNC: 107 MG/DL
LEUKOCYTE ESTERASE UR QL STRIP: NEGATIVE
MCH RBC QN AUTO: 31 PG (ref 26.5–33)
MCHC RBC AUTO-ENTMCNC: 33 G/DL (ref 31.5–36.5)
MCV RBC AUTO: 94 FL (ref 78–100)
MICROALBUMIN UR-MCNC: <12 MG/L
MICROALBUMIN/CREAT UR: NORMAL MG/G{CREAT}
NITRATE UR QL: NEGATIVE
NONHDLC SERPL-MCNC: 151 MG/DL
PH UR STRIP: 5.5 [PH] (ref 5–7)
PLATELET # BLD AUTO: 195 10E3/UL (ref 150–450)
POTASSIUM SERPL-SCNC: 4.4 MMOL/L (ref 3.4–5.3)
PROT SERPL-MCNC: 7.3 G/DL (ref 6.4–8.3)
PSA SERPL DL<=0.01 NG/ML-MCNC: 0.65 NG/ML (ref 0–4.5)
RBC # BLD AUTO: 4.65 10E6/UL (ref 4.4–5.9)
SODIUM SERPL-SCNC: 140 MMOL/L (ref 136–145)
SP GR UR STRIP: 1.02 (ref 1–1.03)
T4 FREE SERPL-MCNC: 1.22 NG/DL (ref 0.9–1.7)
TRIGL SERPL-MCNC: 219 MG/DL
TSH SERPL DL<=0.005 MIU/L-ACNC: 4.89 UIU/ML (ref 0.3–4.2)
UROBILINOGEN UR STRIP-ACNC: 0.2 E.U./DL
WBC # BLD AUTO: 4.9 10E3/UL (ref 4–11)

## 2023-05-15 PROCEDURE — 36415 COLL VENOUS BLD VENIPUNCTURE: CPT | Performed by: FAMILY MEDICINE

## 2023-05-15 PROCEDURE — 85027 COMPLETE CBC AUTOMATED: CPT | Performed by: FAMILY MEDICINE

## 2023-05-15 PROCEDURE — 90471 IMMUNIZATION ADMIN: CPT | Performed by: FAMILY MEDICINE

## 2023-05-15 PROCEDURE — 82043 UR ALBUMIN QUANTITATIVE: CPT | Performed by: FAMILY MEDICINE

## 2023-05-15 PROCEDURE — 90677 PCV20 VACCINE IM: CPT | Performed by: FAMILY MEDICINE

## 2023-05-15 PROCEDURE — 99214 OFFICE O/P EST MOD 30 MIN: CPT | Mod: 25 | Performed by: FAMILY MEDICINE

## 2023-05-15 PROCEDURE — G0103 PSA SCREENING: HCPCS | Performed by: FAMILY MEDICINE

## 2023-05-15 PROCEDURE — 80053 COMPREHEN METABOLIC PANEL: CPT | Performed by: FAMILY MEDICINE

## 2023-05-15 PROCEDURE — 82570 ASSAY OF URINE CREATININE: CPT | Performed by: FAMILY MEDICINE

## 2023-05-15 PROCEDURE — 84443 ASSAY THYROID STIM HORMONE: CPT | Performed by: FAMILY MEDICINE

## 2023-05-15 PROCEDURE — 82550 ASSAY OF CK (CPK): CPT | Performed by: FAMILY MEDICINE

## 2023-05-15 PROCEDURE — 81003 URINALYSIS AUTO W/O SCOPE: CPT | Performed by: FAMILY MEDICINE

## 2023-05-15 PROCEDURE — 80061 LIPID PANEL: CPT | Performed by: FAMILY MEDICINE

## 2023-05-15 PROCEDURE — 84439 ASSAY OF FREE THYROXINE: CPT | Performed by: FAMILY MEDICINE

## 2023-05-15 PROCEDURE — 99396 PREV VISIT EST AGE 40-64: CPT | Mod: 25 | Performed by: FAMILY MEDICINE

## 2023-05-15 RX ORDER — PRAVASTATIN SODIUM 20 MG
20 TABLET ORAL DAILY
Qty: 90 TABLET | Refills: 3 | Status: SHIPPED | OUTPATIENT
Start: 2023-05-15 | End: 2024-05-16

## 2023-05-15 RX ORDER — LEVOTHYROXINE SODIUM 50 UG/1
50 TABLET ORAL DAILY
Qty: 90 TABLET | Refills: 3 | Status: SHIPPED | OUTPATIENT
Start: 2023-05-15 | End: 2024-05-16

## 2023-05-15 ASSESSMENT — ENCOUNTER SYMPTOMS
HEMATOCHEZIA: 0
CHILLS: 0
CONSTIPATION: 0
SHORTNESS OF BREATH: 0
EYE PAIN: 0
DYSURIA: 0
HEMATURIA: 0
MYALGIAS: 0
SORE THROAT: 0
PALPITATIONS: 0
HEARTBURN: 0
HEADACHES: 0
DIARRHEA: 0
JOINT SWELLING: 0
COUGH: 0
WEAKNESS: 0
DIZZINESS: 0
NAUSEA: 0
FREQUENCY: 0
ARTHRALGIAS: 1
FEVER: 0
NERVOUS/ANXIOUS: 0
ABDOMINAL PAIN: 0
PARESTHESIAS: 0

## 2023-05-15 NOTE — PROGRESS NOTES
[facility]  [place]    SUBJECTIVE    CC: Tavo is an 62 year old who presents for preventative health visit.       5/15/2023     7:38 AM   Additional Questions   Roomed by Madisyn DIAZ          Patient has been advised of split billing requirements and indicates understanding: Yes     Healthy Habits:     Getting at least 3 servings of Calcium per day:  Yes    Bi-annual eye exam:  Yes    Dental care twice a year:  Yes    Sleep apnea or symptoms of sleep apnea:  Daytime drowsiness    Diet:  Regular (no restrictions)    Frequency of exercise:  2-3 days/week    Duration of exercise:  45-60 minutes    Taking medications regularly:  Yes    Medication side effects:  None    PHQ-2 Total Score: 0    Additional concerns today:  No    Today's PHQ-2 Score:       5/8/2023     9:29 AM   PHQ-2 ( 1999 [employer])   Q1: Little interest or pleasure in doing things 0   Q2: Feeling down, depressed or hopeless 0   PHQ-2 Score 0   Q1: Little interest or pleasure in doing things Not at all   Q2: Feeling down, depressed or hopeless Not at all   PHQ-2 Score 0       Have you ever done Advance Care Planning? (For example, a Health Directive, POLST, or a discussion with a medical provider or your loved ones about your wishes): No, advance care planning information given to patient to review.  Patient declined advance care planning discussion at this time.    Social History     Tobacco Use     Smoking status: Never     Smokeless tobacco: Never   Vaping Use     Vaping status: Never Used   Substance Use Topics     Alcohol use: Yes     Types: 2 - 3 Standard drinks or equivalent per week     Comment: 2-3 drinks per week avg             5/8/2023     9:29 AM   Alcohol Use   Prescreen: >3 drinks/day or >7 drinks/week? No          View : No data to display.                Last PSA:   PSA   Date Value Ref Range Status   04/30/2021 0.59 0 - 4 ug/L Final     Comment:     Assay Method:  Chemiluminescence using Siemens Vista analyzer     Prostate  Specific Antigen Screen   Date Value Ref Range Status   05/11/2022 0.53 0.00 - 4.00 ug/L Final       Reviewed orders with patient. Reviewed health maintenance and updated orders accordingly - Yes    Reviewed and updated as needed this visit by clinical staff   Tobacco  Allergies  Meds  Problems  Med Hx  Surg Hx  Fam Hx          Reviewed and updated as needed this visit by Provider     Meds             Review of Systems   Constitutional: Negative for chills and fever.   HENT: Positive for hearing loss. Negative for congestion, ear pain and sore throat.    Eyes: Negative for pain and visual disturbance.   Respiratory: Negative for cough and shortness of breath.    Cardiovascular: Negative for chest pain, palpitations and peripheral edema.   Gastrointestinal: Negative for abdominal pain, constipation, diarrhea, heartburn, hematochezia and nausea.   Genitourinary: Negative for dysuria, frequency, genital sores, hematuria, impotence, penile discharge and urgency.   Musculoskeletal: Positive for arthralgias. Negative for joint swelling and myalgias.   Skin: Negative for rash.   Neurological: Negative for dizziness, weakness, headaches and paresthesias.   Psychiatric/Behavioral: Negative for mood changes. The patient is not nervous/anxious.      Lipids    Recent Labs   Lab Test 05/11/22  0746 04/30/21  0828   CHOL 170 173   HDL 39* 41   * 105*   TRIG 110 135     Hypothyroid    TSH   Date Value Ref Range Status   05/11/2022 3.17 0.40 - 4.00 mU/L Final   04/30/2021 2.96 0.40 - 4.00 mU/L Final     Environmental Allergies - using allegra with good results    PVC's - no issues    Glaucoma - using meds x 2, with good results, [facility]    Rt knee - improving    Diarrhea - last few days - no recent abx, traveled to [place] 2 weeks ago, no camping, using pepto bismol, using probiotics, no f/c/s, no n/v, doesn't feel sick    Health Maintenance     Colonoscopy:  Due 11/2024   FIT:  given              PSA:   pending   DEXA:  NA    Health Maintenance Due   Topic Date Due     INFLUENZA VACCINE (1) 09/01/2022     LIPID  05/11/2023     T4 FREE  05/11/2023     PSA  05/11/2023     TSH W/FREE T4 REFLEX  05/11/2023       Current Problem List    Patient Active Problem List   Diagnosis     Seasonal allergies     Adenomatous polyp of colon     Subclinical hypothyroidism     Hyperlipidemia LDL goal <130     Advanced directives, counseling/discussion     PVC (premature ventricular contraction)     Environmental allergies     Glaucoma       Past Medical History    Past Medical History:   Diagnosis Date     Adenomatous polyp of colon 9/09, 10/14    tubular - due 5 yrs     Environmental allergies     seasonal - spring     Glaucoma 05/2005    [facility] - bilateral     Hyperlipidemia LDL goal <130     borderline     PVC (premature ventricular contraction) 12/15     Subclinical hypothyroidism 9/09       Past Surgical History    Past Surgical History:   Procedure Laterality Date     COLONOSCOPY  9/09, 10/14, 11/19    History of  tubular adenomatous polyps - due 5 yrs - internal hemorrhoids     STRESS ECHO (METRO)  3/07, 10/09, 12/11, 1/14    normal     SURGICAL HISTORY OF -  Bilateral 06/1987    HERNIA - JUNIOR     SURGICAL HISTORY OF -  Right 1996    RT SHOULDER - Dr Julien     VASECTOMY  1994       Current Medications    Current Outpatient Medications   Medication Sig Dispense Refill     brimonidine-timolol (COMBIGAN) 0.2-0.5 % ophthalmic solution        levothyroxine (SYNTHROID/LEVOTHROID) 50 MCG tablet Take 1 tablet (50 mcg) by mouth daily 90 tablet 3     pravastatin (PRAVACHOL) 20 MG tablet Take 1 tablet (20 mg) by mouth daily 90 tablet 3     XALATAN 0.005 % OP SOLN None Entered         Allergies    Allergies   Allergen Reactions     Amoxicillin Rash     rash     Azithromycin Tinnitus     ?       Immunizations    Immunization History   Administered Date(s) Administered     COVID-19 Bivalent 18+ (Moderna) 12/14/2022     COVID-19  MONOVALENT 12+ (Pfizer) 2021, 2021     COVID-19 Monovalent 18+ (Moderna) 2021     COVID-19 Monovalent Booster 18+ (Moderna) 2022     Influenza (IIV3) PF 2014     Pneumococcal 20 valent Conjugate (Prevnar 20) 05/15/2023     Pneumococcal 23 valent 2019     TD,PF 7+ (Tenivac) 1996     TDAP (Adacel,Boostrix) 2015     TDAP Vaccine (Adacel) 2007     Twinrix A/B 2013, 2014, 2014     Zoster recombinant adjuvanted (SHINGRIX) 2021, 2021       Family History    Family History   Problem Relation Age of Onset     Other - See Comments Mother          in MVA at age 51     C.A.D. Father 61        age 61     Thyroid Disease Father      Coronary Artery Disease Sister      No Known Problems Sister      C.A.D. Brother 42        age 42     Depression Brother         suicide     Mental Illness Brother         anxiety     No Known Problems Maternal Grandmother      No Known Problems Maternal Grandfather      Cancer Paternal Grandmother         stomach ca     Cancer Paternal Grandfather         stomach ca     Pancreatic Cancer Cousin         1st cousin       Social History    Social History     Socioeconomic History     Marital status:      Spouse name: Maribell     Number of children: 3     Years of education: 16     Highest education level: Not on file   Occupational History     Employer: [employer]   Tobacco Use     Smoking status: Never     Smokeless tobacco: Never   Vaping Use     Vaping status: Never Used   Substance and Sexual Activity     Alcohol use: Yes     Types: 2 - 3 Standard drinks or equivalent per week     Comment: 2-3 drinks per week avg     Drug use: No     Sexual activity: Yes     Partners: Female     Birth control/protection: Male Surgical   Other Topics Concern      Service No     Blood Transfusions No     Caffeine Concern Yes     Comment: 1-2 cups qd     Occupational Exposure No     Hobby Hazards No     Sleep  "Concern No     Stress Concern No     Weight Concern No     Special Diet Not Asked     Back Care Not Asked     Exercise Yes     Comment: lifts and treadmill - Wii Fit - [employer] fitness - 2-3/week     Bike Helmet Not Asked     Seat Belt Yes     Self-Exams Not Asked     Parent/sibling w/ CABG, MI or angioplasty before 65F 55M? Yes   Social History Narrative     Not on file     Social Determinants of Health     Financial Resource Strain: Not on file   Food Insecurity: Not on file   Transportation Needs: Not on file   Physical Activity: Not on file   Stress: Not on file   Social Connections: Not on file   Intimate Partner Violence: Not on file   Housing Stability: Not on file       ROS    CONSTITUTIONAL: NEGATIVE for fever, chills, change in weight  INTEGUMENTARY/SKIN: NEGATIVE for worrisome rashes, moles or lesions  EYES: NEGATIVE for vision changes or irritation  ENT/MOUTH: NEGATIVE for ear, mouth and throat problems  RESP: NEGATIVE for significant cough or SOB  BREAST: NEGATIVE for masses, tenderness or discharge  CV: NEGATIVE for chest pain, palpitations or peripheral edema  GI: NEGATIVE for nausea, abdominal pain, heartburn, or change in bowel habits  : NEGATIVE for frequency, dysuria, or hematuria  MUSCULOSKELETAL: NEGATIVE for significant arthralgias or myalgia  NEURO: NEGATIVE for weakness, dizziness or paresthesias  ENDOCRINE: NEGATIVE for temperature intolerance, skin/hair changes  HEME: NEGATIVE for bleeding problems  PSYCHIATRIC: NEGATIVE for changes in mood or affect    OBJECTIVE    /78   Pulse 78   Temp 97.6  F (36.4  C) (Tympanic)   Resp 16   Ht 1.759 m (5' 9.25\")   Wt 85.5 kg (188 lb 6.4 oz)   SpO2 95%   BMI 27.62 kg/m      EXAM:    GENERAL: healthy, alert and no distress  EYES: Eyes grossly normal to inspection, PERRL and conjunctivae and sclerae normal  HENT: ear canals and TM's normal, nose and mouth without ulcers or lesions, cerumen R>L  NECK: no adenopathy, no asymmetry, masses, or scars " and thyroid normal to palpation  RESP: lungs clear to auscultation - no rales, rhonchi or wheezes  CV: regular rate and rhythm, normal S1 S2, no S3 or S4, no murmur, click or rub, no peripheral edema and peripheral pulses strong  ABDOMEN: soft, nontender, no hepatosplenomegaly, no masses and bowel sounds normal   (male): pt declines  RECTAL: pt declines  MS: no gross musculoskeletal defects noted, no edema  SKIN: no suspicious lesions or rashes  NEURO: Normal strength and tone, mentation intact and speech normal  PSYCH: mentation appears normal, affect normal/bright  LYMPH: no cervical, supraclavicular, axillary, or inguinal adenopathy    DIAGNOSTICS/PROCEDURES    Pending    ASSESSMENT      ICD-10-CM    1. Routine general medical examination at a health care facility  Z00.00 Comprehensive metabolic panel     Lipid panel reflex to direct LDL Fasting     CBC with platelets     CK total     UA Macroscopic with reflex to Microscopic and Culture     Albumin Random Urine Quantitative with Creat Ratio     Prostate Specific Antigen Screen     Fecal colorectal cancer screen FIT     TSH     T4, free     PRIMARY CARE FOLLOW-UP SCHEDULING     REVIEW OF HEALTH MAINTENANCE PROTOCOL ORDERS     Comprehensive metabolic panel     Lipid panel reflex to direct LDL Fasting     CBC with platelets     CK total     UA Macroscopic with reflex to Microscopic and Culture     Albumin Random Urine Quantitative with Creat Ratio     Prostate Specific Antigen Screen     Fecal colorectal cancer screen FIT     TSH     T4, free      2. Hyperlipidemia LDL goal <130  E78.5 Comprehensive metabolic panel     Lipid panel reflex to direct LDL Fasting     CK total     PRIMARY CARE FOLLOW-UP SCHEDULING     REVIEW OF HEALTH MAINTENANCE PROTOCOL ORDERS     pravastatin (PRAVACHOL) 20 MG tablet     OFFICE/OUTPT VISIT,EST,LEVL III     Comprehensive metabolic panel     Lipid panel reflex to direct LDL Fasting     CK total      3. Subclinical hypothyroidism   E03.8 TSH     T4, free     PRIMARY CARE FOLLOW-UP SCHEDULING     REVIEW OF HEALTH MAINTENANCE PROTOCOL ORDERS     levothyroxine (SYNTHROID/LEVOTHROID) 50 MCG tablet     OFFICE/OUTPT VISIT,EST,LEVL III     TSH     T4, free      4. Diarrhea, unspecified type  R19.7 OFFICE/OUTPT VISIT,EST,LEVL III      5. Environmental allergies  Z91.09 PRIMARY CARE FOLLOW-UP SCHEDULING     REVIEW OF HEALTH MAINTENANCE PROTOCOL ORDERS     OFFICE/OUTPT VISIT,EST,LEVL III      6. PVC (premature ventricular contraction)  I49.3 PRIMARY CARE FOLLOW-UP SCHEDULING     REVIEW OF HEALTH MAINTENANCE PROTOCOL ORDERS     OFFICE/OUTPT VISIT,EST,LEVL III      7. Glaucoma of both eyes, unspecified glaucoma type  H40.9 PRIMARY CARE FOLLOW-UP SCHEDULING     REVIEW OF HEALTH MAINTENANCE PROTOCOL ORDERS     OFFICE/OUTPT VISIT,EST,LEVL III      8. Adenomatous polyp of colon, unspecified part of colon  D12.6 Fecal colorectal cancer screen FIT     PRIMARY CARE FOLLOW-UP SCHEDULING     REVIEW OF HEALTH MAINTENANCE PROTOCOL ORDERS     OFFICE/OUTPT VISIT,EST,LEVL III     Fecal colorectal cancer screen FIT      9. Screen for colon cancer  Z12.11 Fecal colorectal cancer screen FIT     PRIMARY CARE FOLLOW-UP SCHEDULING     REVIEW OF HEALTH MAINTENANCE PROTOCOL ORDERS     OFFICE/OUTPT VISIT,EST,LEVL III     Fecal colorectal cancer screen FIT      10. Screening for prostate cancer  Z12.5 Prostate Specific Antigen Screen     PRIMARY CARE FOLLOW-UP SCHEDULING     REVIEW OF HEALTH MAINTENANCE PROTOCOL ORDERS     OFFICE/OUTPT VISIT,EST,LEVL III     Prostate Specific Antigen Screen      11. Medication monitoring encounter  Z51.81 Comprehensive metabolic panel     Lipid panel reflex to direct LDL Fasting     CBC with platelets     CK total     UA Macroscopic with reflex to Microscopic and Culture     Albumin Random Urine Quantitative with Creat Ratio     TSH     T4, free     PRIMARY CARE FOLLOW-UP SCHEDULING     REVIEW OF HEALTH MAINTENANCE PROTOCOL ORDERS      OFFICE/OUTPT VISIT,EST,LEVL III     Comprehensive metabolic panel     Lipid panel reflex to direct LDL Fasting     CBC with platelets     CK total     UA Macroscopic with reflex to Microscopic and Culture     Albumin Random Urine Quantitative with Creat Ratio     TSH     T4, free      12. Need for pneumococcal vaccination  Z23 PNEUMOCOCCAL 20 VALENT CONJUGATE (PREVNAR 20)     OFFICE/OUTPT VISIT,EST,LEVL III          PLAN    Discussed treatment/modality options, including risk and benefits, he desires:    advised alcohol consumption 1oz per day or less, advised 1 multivitamin per day, advised calcium 6172-2304 mg/d and Vitamin D 800-1200 IU/d, advised dentist every 6 months, advised diet and exercise, advised opthalmologist every 4 months, advised self testicular exam q month, further health care maintenance, further lab(s), immunization(s), medication refill(s) and observation    Discussed controversies surrounding PSA. Specifically reviewed 2017 USPSTF findings recommending discussion of PSA testing for men ages 55-69.  Reviewed findings of the  Randomized Study of Screening for Prostate Cancer which showed a 30% reduction in advanced stage prostate cancer and a 20% reduction in death rate from prostate cancer in this age group. PSA-based screening may prevent up to 2 deaths and up to 3 cases of metastatic disease per 1,000 men screened over 13 years.    We've elected to do PSA this year after discussing these controversies.    All diagnosis above reviewed and noted above, otherwise stable.      [facility] for further details.      1) med refills    2) labs pending    3) immunizations reviewed - prevnar 20 given    4) BRAT diet, probiotics, consider cultures    5) Ophthalmology    Return in about 1 year (around 5/15/2024) for Complete Physical, Medication Recheck Visit, Follow Up Chronic.    Health Maintenance Due   Topic Date Due     INFLUENZA VACCINE (1) 09/01/2022     LIPID  05/11/2023     T4  "FREE  05/11/2023     PSA  05/11/2023     TSH W/FREE T4 REFLEX  05/11/2023       COUNSELING    Reviewed preventive health counseling, as reflected in patient instructions    BP Readings from Last 1 Encounters:   05/15/23 122/78     Estimated body mass index is 27.62 kg/m  as calculated from the following:    Height as of this encounter: 1.759 m (5' 9.25\").    Weight as of this encounter: 85.5 kg (188 lb 6.4 oz).           reports that he has never smoked. He has never used smokeless tobacco.      Counseling Resources:    ATP IV Guidelines  Pooled Cohorts Equation Calculator  FRAX Risk Assessment  ICSI Preventive Guidelines  Dietary Guidelines for Americans, 2010  USDA's MyPlate  ASA Prophylaxis  Lung CA Screening           Reza Lynn MD, FAAFP     [facility]  [address] [place] [id number]  [email or web address]   Office: [phone]  Fax: [phone]  Pager: [phone]     "

## 2023-05-15 NOTE — LETTER
[facility]  [address] [facility], [place] [address] [phone]                    May 22, 2023    Tavo Johnson  [address] [address]      Dear Tavo,    Here is a summary of your recent test results:    They showed:     Labs are overall quite good, except     Elevated triglycerides   Low HDL (good cholesterol)   Borderline thyroid replacement   Borderline glucose     We advise:     Continue current cares.   Balanced low cholesterol diet.   Regular exercise.     Recheck fasting labs in 3-4 months     For additional lab test information, [email or web address] is an excellent reference.      Your test results are enclosed.      Please contact me if you have any questions.    In addition, here is a list of due or overdue Health Maintenance reminders.    Health Maintenance Due   Topic Date Due    Flu Vaccine (1) 09/01/2022       Please call us at [phone] (or use Likeeds) to address the above recommendations.            Thank you very much for trusting [facility].     Healthy regards,          Reza Lynn M.D.        Results for orders placed or performed in visit on 05/15/23   Comprehensive metabolic panel     Status: Abnormal   Result Value Ref Range    Sodium 140 136 - 145 mmol/L    Potassium 4.4 3.4 - 5.3 mmol/L    Chloride 107 98 - 107 mmol/L    Carbon Dioxide (CO2) 23 22 - 29 mmol/L    Anion Gap 10 7 - 15 mmol/L    Urea Nitrogen 15.5 8.0 - 23.0 mg/dL    Creatinine 1.08 0.67 - 1.17 mg/dL    Calcium 9.3 8.8 - 10.2 mg/dL    Glucose 101 (H) 70 - 99 mg/dL    Alkaline Phosphatase 75 40 - 129 U/L    AST 25 10 - 50 U/L    ALT 24 10 - 50 U/L    Protein Total 7.3 6.4 - 8.3 g/dL    Albumin 4.4 3.5 - 5.2 g/dL    Bilirubin Total 0.7 <=1.2 mg/dL    GFR Estimate 77 >60 mL/min/1.73m2   Lipid panel reflex to direct LDL Fasting     Status: Abnormal   Result Value Ref Range    Cholesterol 189 <200 mg/dL    Triglycerides 219 (H) <150 mg/dL     Direct Measure HDL 38 (L) >=40 mg/dL    LDL Cholesterol Calculated 107 (H) <=100 mg/dL    Non HDL Cholesterol 151 (H) <130 mg/dL    Narrative    Cholesterol  Desirable:  <200 mg/dL    Triglycerides  Normal:  Less than 150 mg/dL  Borderline High:  150-199 mg/dL  High:  200-499 mg/dL  Very High:  Greater than or equal to 500 mg/dL    Direct Measure HDL  Female:  Greater than or equal to 50 mg/dL   Male:  Greater than or equal to 40 mg/dL    LDL Cholesterol  Desirable:  <100mg/dL  Above Desirable:  100-129 mg/dL   Borderline High:  130-159 mg/dL   High:  160-189 mg/dL   Very High:  >= 190 mg/dL    Non HDL Cholesterol  Desirable:  130 mg/dL  Above Desirable:  130-159 mg/dL  Borderline High:  160-189 mg/dL  High:  190-219 mg/dL  Very High:  Greater than or equal to 220 mg/dL   CBC with platelets     Status: Normal   Result Value Ref Range    WBC Count 4.9 4.0 - 11.0 10e3/uL    RBC Count 4.65 4.40 - 5.90 10e6/uL    Hemoglobin 14.4 13.3 - 17.7 g/dL    Hematocrit 43.6 40.0 - 53.0 %    MCV 94 78 - 100 fL    MCH 31.0 26.5 - 33.0 pg    MCHC 33.0 31.5 - 36.5 g/dL    RDW 12.6 10.0 - 15.0 %    Platelet Count 195 150 - 450 10e3/uL   CK total     Status: Normal   Result Value Ref Range     39 - 308 U/L   UA Macroscopic with reflex to Microscopic and Culture     Status: Normal    Specimen: Urine, NOS   Result Value Ref Range    Color Urine Yellow Colorless, Straw, Light Yellow, Yellow    Appearance Urine Clear Clear    Glucose Urine Negative Negative mg/dL    Bilirubin Urine Negative Negative    Ketones Urine Negative Negative mg/dL    Specific Gravity Urine 1.020 1.003 - 1.035    Blood Urine Negative Negative    pH Urine 5.5 5.0 - 7.0    Protein Albumin Urine Negative Negative mg/dL    Urobilinogen Urine 0.2 0.2, 1.0 E.U./dL    Nitrite Urine Negative Negative    Leukocyte Esterase Urine Negative Negative    Narrative    Microscopic not indicated   Albumin Random Urine Quantitative with Creat Ratio     Status: None   Result  Value Ref Range    Creatinine Urine mg/dL 105.0 mg/dL    Albumin Urine mg/L <12.0 mg/L    Albumin Urine mg/g Cr     Prostate Specific Antigen Screen     Status: Normal   Result Value Ref Range    Prostate Specific Antigen Screen 0.65 0.00 - 4.50 ng/mL    Narrative    This result is obtained using the Roche Elecsys total PSA method on the haydee e801 immunoassay analyzer. Results obtained with different assay methods or kits cannot be used interchangeably.   TSH     Status: Abnormal   Result Value Ref Range    TSH 4.89 (H) 0.30 - 4.20 uIU/mL   T4, free     Status: Normal   Result Value Ref Range    Free T4 1.22 0.90 - 1.70 ng/dL

## 2023-05-21 DIAGNOSIS — Z51.81 MEDICATION MONITORING ENCOUNTER: ICD-10-CM

## 2023-05-21 DIAGNOSIS — E78.5 HYPERLIPIDEMIA LDL GOAL <130: Primary | ICD-10-CM

## 2023-05-21 DIAGNOSIS — E03.8 SUBCLINICAL HYPOTHYROIDISM: ICD-10-CM

## 2023-05-21 DIAGNOSIS — R73.09 ELEVATED GLUCOSE: ICD-10-CM

## 2023-05-22 ENCOUNTER — MYC MEDICAL ADVICE (OUTPATIENT)
Dept: FAMILY MEDICINE | Facility: CLINIC | Age: 63
End: 2023-05-22
Payer: COMMERCIAL

## 2023-05-23 ENCOUNTER — OFFICE VISIT (OUTPATIENT)
Dept: PEDIATRICS | Facility: CLINIC | Age: 63
End: 2023-05-23
Attending: FAMILY MEDICINE
Payer: COMMERCIAL

## 2023-05-23 ENCOUNTER — NURSE TRIAGE (OUTPATIENT)
Dept: FAMILY MEDICINE | Facility: CLINIC | Age: 63
End: 2023-05-23

## 2023-05-23 VITALS
DIASTOLIC BLOOD PRESSURE: 85 MMHG | HEART RATE: 76 BPM | TEMPERATURE: 98.1 F | SYSTOLIC BLOOD PRESSURE: 121 MMHG | WEIGHT: 187 LBS | RESPIRATION RATE: 18 BRPM | OXYGEN SATURATION: 98 % | BODY MASS INDEX: 27.42 KG/M2

## 2023-05-23 DIAGNOSIS — R19.7 DIARRHEA: Primary | ICD-10-CM

## 2023-05-23 DIAGNOSIS — R19.7 DIARRHEA, UNSPECIFIED TYPE: ICD-10-CM

## 2023-05-23 DIAGNOSIS — E86.0 DEHYDRATION: Primary | ICD-10-CM

## 2023-05-23 LAB
ALBUMIN SERPL BCG-MCNC: 4.3 G/DL (ref 3.5–5.2)
ALP SERPL-CCNC: 80 U/L (ref 40–129)
ALT SERPL W P-5'-P-CCNC: 23 U/L (ref 10–50)
ANION GAP SERPL CALCULATED.3IONS-SCNC: 9 MMOL/L (ref 7–15)
AST SERPL W P-5'-P-CCNC: 23 U/L (ref 10–50)
BASOPHILS # BLD MANUAL: 0.1 10E3/UL (ref 0–0.2)
BASOPHILS NFR BLD MANUAL: 1 %
BILIRUB SERPL-MCNC: 1.4 MG/DL
BUN SERPL-MCNC: 20.9 MG/DL (ref 8–23)
CALCIUM SERPL-MCNC: 9.3 MG/DL (ref 8.8–10.2)
CHLORIDE SERPL-SCNC: 105 MMOL/L (ref 98–107)
CREAT SERPL-MCNC: 1.14 MG/DL (ref 0.67–1.17)
DEPRECATED HCO3 PLAS-SCNC: 25 MMOL/L (ref 22–29)
EOSINOPHIL # BLD MANUAL: 0.1 10E3/UL (ref 0–0.7)
EOSINOPHIL NFR BLD MANUAL: 1 %
ERYTHROCYTE [DISTWIDTH] IN BLOOD BY AUTOMATED COUNT: 12.6 % (ref 10–15)
ERYTHROCYTE [SEDIMENTATION RATE] IN BLOOD BY WESTERGREN METHOD: 10 MM/HR (ref 0–20)
GFR SERPL CREATININE-BSD FRML MDRD: 72 ML/MIN/1.73M2
GLUCOSE SERPL-MCNC: 102 MG/DL (ref 70–99)
HCT VFR BLD AUTO: 43.8 % (ref 40–53)
HEMOCCULT STL QL: NEGATIVE
HGB BLD-MCNC: 14.3 G/DL (ref 13.3–17.7)
LYMPHOCYTES # BLD MANUAL: 1.3 10E3/UL (ref 0.8–5.3)
LYMPHOCYTES NFR BLD MANUAL: 24 %
MCH RBC QN AUTO: 31.6 PG (ref 26.5–33)
MCHC RBC AUTO-ENTMCNC: 32.6 G/DL (ref 31.5–36.5)
MCV RBC AUTO: 97 FL (ref 78–100)
MONOCYTES # BLD MANUAL: 0.6 10E3/UL (ref 0–1.3)
MONOCYTES NFR BLD MANUAL: 12 %
NEUTROPHILS # BLD MANUAL: 3.3 10E3/UL (ref 1.6–8.3)
NEUTROPHILS NFR BLD MANUAL: 62 %
PLAT MORPH BLD: ABNORMAL
PLATELET # BLD AUTO: 179 10E3/UL (ref 150–450)
POTASSIUM SERPL-SCNC: 4.5 MMOL/L (ref 3.4–5.3)
PROT SERPL-MCNC: 7 G/DL (ref 6.4–8.3)
RBC # BLD AUTO: 4.53 10E6/UL (ref 4.4–5.9)
RBC MORPH BLD: ABNORMAL
SODIUM SERPL-SCNC: 139 MMOL/L (ref 136–145)
VARIANT LYMPHS BLD QL SMEAR: PRESENT
WBC # BLD AUTO: 5.4 10E3/UL (ref 4–11)

## 2023-05-23 PROCEDURE — 80053 COMPREHEN METABOLIC PANEL: CPT | Performed by: PHYSICIAN ASSISTANT

## 2023-05-23 PROCEDURE — 99207 REFERRAL TO ACUTE AND DIAGNOSTIC SERVICES: CPT | Performed by: FAMILY MEDICINE

## 2023-05-23 PROCEDURE — 85027 COMPLETE CBC AUTOMATED: CPT | Performed by: PHYSICIAN ASSISTANT

## 2023-05-23 PROCEDURE — 96360 HYDRATION IV INFUSION INIT: CPT | Performed by: PHYSICIAN ASSISTANT

## 2023-05-23 PROCEDURE — 87329 GIARDIA AG IA: CPT

## 2023-05-23 PROCEDURE — 99417 PROLNG OP E/M EACH 15 MIN: CPT | Performed by: PHYSICIAN ASSISTANT

## 2023-05-23 PROCEDURE — 87177 OVA AND PARASITES SMEARS: CPT

## 2023-05-23 PROCEDURE — 87209 SMEAR COMPLEX STAIN: CPT

## 2023-05-23 PROCEDURE — 99215 OFFICE O/P EST HI 40 MIN: CPT | Mod: 25 | Performed by: PHYSICIAN ASSISTANT

## 2023-05-23 PROCEDURE — 82272 OCCULT BLD FECES 1-3 TESTS: CPT | Performed by: PHYSICIAN ASSISTANT

## 2023-05-23 PROCEDURE — 87328 CRYPTOSPORIDIUM AG IA: CPT

## 2023-05-23 PROCEDURE — 87506 IADNA-DNA/RNA PROBE TQ 6-11: CPT | Performed by: PHYSICIAN ASSISTANT

## 2023-05-23 PROCEDURE — 36415 COLL VENOUS BLD VENIPUNCTURE: CPT | Performed by: PHYSICIAN ASSISTANT

## 2023-05-23 PROCEDURE — 85652 RBC SED RATE AUTOMATED: CPT | Performed by: PHYSICIAN ASSISTANT

## 2023-05-23 PROCEDURE — 85007 BL SMEAR W/DIFF WBC COUNT: CPT | Performed by: PHYSICIAN ASSISTANT

## 2023-05-23 RX ORDER — DIPHENOXYLATE HCL/ATROPINE 2.5-.025MG
1 TABLET ORAL 3 TIMES DAILY PRN
Qty: 30 TABLET | Refills: 0 | Status: SHIPPED | OUTPATIENT
Start: 2023-05-23 | End: 2023-06-08

## 2023-05-23 RX ADMIN — Medication 1000 ML: at 11:35

## 2023-05-23 NOTE — PROGRESS NOTES
Assessment & Plan     Diarrhea, unspecified type    Diarrhea is when stools are loose and watery. This can be caused by:     Viral infections    Bacterial infections    Food poisoning    Parasites    Irritable bowel syndrome (IBS)    Inflammatory bowel diseases such as ulcerative colitis, Crohn's disease, and celiac disease    Food intolerance, such as to lactose, the sugar found in milk and milk products    This is likely a sort of infectious diarrhea based on onset of symptoms  Collect stool cultures and return to clinic    If diarrhea persists, patient has been referred to GI    - Referral to Acute and Diagnostic Services (Day of diagnostic / First order acute)  - Ova and Parasite Exam Routine; Future  - C. difficile Toxin B PCR with reflex to C. difficile Antigen and Toxins A/B EIA; Future    - CBC with platelets differential  - Erythrocyte sedimentation rate auto  - sodium chloride (PF) 0.9% PF flush 3 mL  - Occult blood stool; Future  - Occult blood stool  - diphenoxylate-atropine (LOMOTIL) 2.5-0.025 MG tablet; Take 1 tablet by mouth 3 times daily as needed for diarrhea    - Adult GI  Referral - Consult Only; Future  - Enteric Bacteria and Virus Panel by [facility] Stool    Dehydration    Diarrhea can cause dehydration. This is the loss of too much water and other fluids from the body. When this occurs, you must replace those body fluids. This can be done with oral rehydration solutions. Oral rehydration solutions are available at drugstores and grocery stores without a prescription. Sports drinks are not the best choice if you are very dehydrated. They usually have too much sugar and not enough electrolytes.     Patients CMP and CBC are normal, no signs of electrolyte abnormalities    Patient was given 1 liter of fluids, NS to rehydrate    Continue to hydrate, electrolytes  Follow up with PCP as needed  If symptoms of dehydration occurs then have PCP refer back to [facility] or go to the ED    - 0.9% sodium  chloride BOLUS  - Comprehensive metabolic panel; Future  - CBC with platelets differential; Future  - Comprehensive metabolic panel  - CBC with platelets differential  - sodium chloride (PF) 0.9% PF flush 3 mL    Review of external notes as documented elsewhere in note  90 minutes spent by me on the date of the encounter doing chart review, history and exam, documentation and further activities per the note       CONSULTATION/REFERRAL to GI for any ongoing diarrhea symptoms      Amol Rodríguez, [facility], PA-C  [facility] KARRIE Vo is a 62 year old, presenting for the following health issues:  Diarrhea (X 2 weeks)      HPI     Diarrhea  Onset/Duration: X 2 weeks  Description:       Consistency of stool: watery, granular solids       Blood in stool: No, but notes black stool       Number of loose stools past 24 hours: 5-6  Progression of Symptoms: same  Accompanying signs and symptoms:       Fever: No       Nausea/Vomiting: No       Abdominal pain: denies pain, but notes cramping and discomfort, lower abdomen area       Weight loss: No       Episodes of constipation: No  History   Ill contacts: No  Recent use of antibiotics: No  Recent travels: No  Recent medication-new or changes(Rx or OTC): Glucosamine and probiotic started taking these X 3 years ago, has since stopped taking these to rule out this as the cause  Precipitating or alleviating factors: no known cause, no alleviating factor  Therapies tried and outcome: Pepto and Imodium, these didn't help.  Notes normal Colonoscopy 11/19/19, previous hx of polyps removed, benign.         Review of Systems   Constitutional, HEENT, cardiovascular, pulmonary, GI, , musculoskeletal, neuro, skin, endocrine and psych systems are negative, except as otherwise noted.      Objective    Wt 84.8 kg (187 lb)   BMI 27.42 kg/m    Body mass index is 27.42 kg/m .  Physical Exam   GENERAL: healthy, alert and no distress  EYES: Eyes grossly normal to  inspection, PERRL and conjunctivae and sclerae normal  HENT: ear canals and TM's normal, nose and mouth without ulcers or lesions  NECK: no adenopathy, no asymmetry, masses, or scars and thyroid normal to palpation  RESP: lungs clear to auscultation - no rales, rhonchi or wheezes  CV: regular rate and rhythm, normal S1 S2, no S3 or S4, no murmur, click or rub, no peripheral edema and peripheral pulses strong  ABDOMEN: soft, nontender, no hepatosplenomegaly, no masses and bowel sounds normal  MS: no gross musculoskeletal defects noted, no edema  SKIN: no suspicious lesions or rashes  NEURO: Normal strength and tone, mentation intact and speech normal  PSYCH: mentation appears normal, affect normal/bright        Results for orders placed or performed in visit on 05/23/23   Comprehensive metabolic panel     Status: Abnormal   Result Value Ref Range    Sodium 139 136 - 145 mmol/L    Potassium 4.5 3.4 - 5.3 mmol/L    Chloride 105 98 - 107 mmol/L    Carbon Dioxide (CO2) 25 22 - 29 mmol/L    Anion Gap 9 7 - 15 mmol/L    Urea Nitrogen 20.9 8.0 - 23.0 mg/dL    Creatinine 1.14 0.67 - 1.17 mg/dL    Calcium 9.3 8.8 - 10.2 mg/dL    Glucose 102 (H) 70 - 99 mg/dL    Alkaline Phosphatase 80 40 - 129 U/L    AST 23 10 - 50 U/L    ALT 23 10 - 50 U/L    Protein Total 7.0 6.4 - 8.3 g/dL    Albumin 4.3 3.5 - 5.2 g/dL    Bilirubin Total 1.4 (H) <=1.2 mg/dL    GFR Estimate 72 >60 mL/min/1.73m2   Erythrocyte sedimentation rate auto     Status: Normal   Result Value Ref Range    Erythrocyte Sedimentation Rate 10 0 - 20 mm/hr   CBC with platelets and differential     Status: Normal   Result Value Ref Range    WBC Count 5.4 4.0 - 11.0 10e3/uL    RBC Count 4.53 4.40 - 5.90 10e6/uL    Hemoglobin 14.3 13.3 - 17.7 g/dL    Hematocrit 43.8 40.0 - 53.0 %    MCV 97 78 - 100 fL    MCH 31.6 26.5 - 33.0 pg    MCHC 32.6 31.5 - 36.5 g/dL    RDW 12.6 10.0 - 15.0 %    Platelet Count 179 150 - 450 10e3/uL   Occult blood stool     Status: Normal   Result Value  Ref Range    Occult Blood Negative Negative   Manual Differential     Status: Abnormal   Result Value Ref Range    % Neutrophils 62 %    % Lymphocytes 24 %    % Monocytes 12 %    % Eosinophils 1 %    % Basophils 1 %    Absolute Neutrophils 3.3 1.6 - 8.3 10e3/uL    Absolute Lymphocytes 1.3 0.8 - 5.3 10e3/uL    Absolute Monocytes 0.6 0.0 - 1.3 10e3/uL    Absolute Eosinophils 0.1 0.0 - 0.7 10e3/uL    Absolute Basophils 0.1 0.0 - 0.2 10e3/uL    RBC Morphology Confirmed RBC Indices     Platelet Assessment  Automated Count Confirmed. Platelet morphology is normal.     Automated Count Confirmed. Platelet morphology is normal.    Reactive Lymphocytes Present (A) None Seen   CBC with platelets differential     Status: Abnormal    Narrative    The following orders were created for panel order CBC with platelets differential.  Procedure                               Abnormality         Status                     ---------                               -----------         ------                     CBC with platelets and d...[[id number]]  Normal              Final result               Manual Differential[[id number]]          Abnormal            Final result                 Please view results for these tests on the individual orders.

## 2023-05-23 NOTE — TELEPHONE ENCOUNTER
"RN called [facility] to get pt in - discussed with the provider Amol in the [place] [facility] and he will accept pt - pt is agreeable to go to [facility]     Reviewed address with pt and he said he will be leaving now     Aimee Hernandez RN, BSN  [facility] Triage    Reason for Disposition    Bloody, black, or tarry bowel movements (Exception: chronic-unchanged black-grey bowel movements and is taking iron pills or Pepto-Bismol)    SEVERE diarrhea (e.g., 7 or more times / day more than normal) and age > 60 years    Additional Information    Negative: Shock suspected (e.g., cold/pale/clammy skin, too weak to stand, low BP, rapid pulse)    Negative: Difficult to awaken or acting confused (e.g., disoriented, slurred speech)    Negative: Sounds like a life-threatening emergency to the triager    Negative: Vomiting also present and worse than the diarrhea    Negative: Blood in stool and without diarrhea    Negative: Constant abdominal pain lasting > 2 hours    Negative: Drinking very little and has signs of dehydration (e.g., no urine > 12 hours, very dry mouth, very lightheaded)    Negative: Patient sounds very sick or weak to the triager    Negative: Weak immune system (e.g., HIV positive, cancer chemo, splenectomy, organ transplant, chronic steroids)    Negative: Mucus or pus in stool has been present > 2 days and diarrhea is more than mild    Answer Assessment - Initial Assessment Questions  1. DIARRHEA SEVERITY: \"How bad is the diarrhea?\" \"How many more stools have you had in the past 24 hours than normal?\"     - NO DIARRHEA (SCALE 0)    - MILD (SCALE 1-3): Few loose or mushy BMs; increase of 1-3 stools over normal daily number of stools; mild increase in ostomy output.    -  MODERATE (SCALE 4-7): Increase of 4-6 stools daily over normal; moderate increase in ostomy output.  * SEVERE (SCALE 8-10; OR 'WORST POSSIBLE'): Increase of 7 or more stools daily over normal; moderate increase in ostomy output; " "incontinence.      6-7 moderate     2. ONSET: \"When did the diarrhea begin?\"       Over 2 weeks     3. BM CONSISTENCY: \"How loose or watery is the diarrhea?\"       Loose, soft    4. VOMITING: \"Are you also vomiting?\" If Yes, ask: \"How many times in the past 24 hours?\"       None     5. ABDOMINAL PAIN: \"Are you having any abdominal pain?\" If Yes, ask: \"What does it feel like?\" (e.g., crampy, dull, intermittent, constant)       Bloating and cramping - mild     6. ABDOMINAL PAIN SEVERITY: If present, ask: \"How bad is the pain?\"  (e.g., Scale 1-10; mild, moderate, or severe)    - MILD (1-3): doesn't interfere with normal activities, abdomen soft and not tender to touch     - MODERATE (4-7): interferes with normal activities or awakens from sleep, abdomen tender to touch     - SEVERE (8-10): excruciating pain, doubled over, unable to do any normal activities        Mild    7. ORAL INTAKE: If vomiting, \"Have you been able to drink liquids?\" \"How much liquids have you had in the past 24 hours?\"      3 - 4 glasses of water and 1 glass of OJ and milk     8. HYDRATION: \"Any signs of dehydration?\" (e.g., dry mouth [not just dry lips], too weak to stand, dizziness, new weight loss) \"When did you last urinate?\"      Does feel lightheaded but that is not a new thing   This morning he peed - yellow     9. EXPOSURE: \"Have you traveled to a foreign country recently?\" \"Have you been exposed to anyone with diarrhea?\" \"Could you have eaten any food that was spoiled?\"      None, none     10. ANTIBIOTIC USE: \"Are you taking antibiotics now or have you taken antibiotics in the past 2 months?\"        None     11. OTHER SYMPTOMS: \"Do you have any other symptoms?\" (e.g., fever, blood in stool)        Stools are black, and grainy, more tired than normal       Denies: fever, nausea, vomiting, CP , SOB, headaches,     12. PREGNANCY: \"Is there any chance you are pregnant?\" \"When was your last menstrual period?\"        None    Protocols used: " DIARRHEA-A-OH

## 2023-05-24 ENCOUNTER — LAB (OUTPATIENT)
Dept: LAB | Facility: CLINIC | Age: 63
End: 2023-05-24
Payer: COMMERCIAL

## 2023-05-24 DIAGNOSIS — R19.7 DIARRHEA, UNSPECIFIED TYPE: ICD-10-CM

## 2023-05-25 LAB
C PARVUM AG STL QL IA: NEGATIVE
G LAMBLIA AG STL QL IA: NEGATIVE
O+P STL MICRO: NEGATIVE

## 2023-06-08 ENCOUNTER — OFFICE VISIT (OUTPATIENT)
Dept: GASTROENTEROLOGY | Facility: CLINIC | Age: 63
End: 2023-06-08
Attending: PHYSICIAN ASSISTANT
Payer: COMMERCIAL

## 2023-06-08 VITALS
WEIGHT: 186.2 LBS | BODY MASS INDEX: 27.58 KG/M2 | HEART RATE: 60 BPM | OXYGEN SATURATION: 100 % | SYSTOLIC BLOOD PRESSURE: 125 MMHG | HEIGHT: 69 IN | DIASTOLIC BLOOD PRESSURE: 79 MMHG

## 2023-06-08 DIAGNOSIS — R19.7 DIARRHEA, UNSPECIFIED TYPE: ICD-10-CM

## 2023-06-08 LAB — CRP SERPL-MCNC: <3 MG/L

## 2023-06-08 PROCEDURE — 86140 C-REACTIVE PROTEIN: CPT | Performed by: PHYSICIAN ASSISTANT

## 2023-06-08 PROCEDURE — 82784 ASSAY IGA/IGD/IGG/IGM EACH: CPT | Performed by: PHYSICIAN ASSISTANT

## 2023-06-08 PROCEDURE — 86364 TISS TRNSGLTMNASE EA IG CLAS: CPT | Performed by: PHYSICIAN ASSISTANT

## 2023-06-08 PROCEDURE — 36415 COLL VENOUS BLD VENIPUNCTURE: CPT | Performed by: PHYSICIAN ASSISTANT

## 2023-06-08 PROCEDURE — 99204 OFFICE O/P NEW MOD 45 MIN: CPT | Performed by: PHYSICIAN ASSISTANT

## 2023-06-08 RX ORDER — CHOLESTYRAMINE 4 G/9G
1 POWDER, FOR SUSPENSION ORAL 2 TIMES DAILY WITH MEALS
Qty: 60 EACH | Refills: 0 | Status: SHIPPED | OUTPATIENT
Start: 2023-06-08 | End: 2023-07-21

## 2023-06-08 ASSESSMENT — PAIN SCALES - GENERAL: PAINLEVEL: MILD PAIN (2)

## 2023-06-08 NOTE — LETTER
6/8/2023         RE: Tavo Johnson  [address] [facility] [id number]        Dear Colleague,    Thank you for referring your patient, Tavo Johnson, to the [facility]. Please see a copy of my visit note below.    NEW PATIENT GI CLINIC VISIT    CC/REFERRING MD:  Amol Rodríguez  REASON FOR CONSULTATION:   Amol Rodríguez for   Chief Complaint   Patient presents with     New Patient     New consult for diarrhea, indigestion and abdominal pain.  Referred per Amol Rodríguez PA-C       ASSESSMENT/PLAN: Patient here for evaluation of subacute, bordering on chronic diarrhea over the past 6 weeks.  Has had limited relief with antidiarrheals.  Infectious testing has been negative thus far.  Reviewed other potential etiologies, which would include malabsorptive process, IBD, microscopic colitis, IBS.  We will trial cholestyramine and check labs and stool studies to rule out the above conditions.  May need to undergo diagnostic colonoscopy as well.  We will follow-up with patient after labs are in.      RTC PRN    Thank you for this consultation.  It was a pleasure to participate in the care of this patient; please contact us with any further questions.      20 minutes spent on the date of the encounter doing chart review, patient visit and documentation    This note was created with voice recognition software, and while reviewed for accuracy, typos may remain.     Ryan Pradhan PA-C  Division of Gastroenterology, Hepatology and Nutrition  [facility]  Tavo Johnson is a 62 year old male with a past medical history significant for hypothyroidism, hyperlipidemia that is seen as a new patient in the GI clinic today for subacute diarrhea.  Patient states that he developed persistent diarrhea the first week of May.  Prior to that, he would have intermittent diarrhea depending on what he ate, but it has been consistent for about 6  "weeks now.  Describes [place] stool chart 5 through 7 stools.  No blood or mucus within the stool.  There can be associated abdominal cramping and bloating.  This has woken him from sleep at times.  Has trialed Pepto-Bismol, Imodium, and Lomotil for several days without significant relief.  Was seen in [facility] about a month ago, underwent infectious diarrheal testing that has came back negative.  Normal CBC, CMP, ESR as well.  Has not had any imaging.  Most recent colonoscopy was in 2019, normal-appearing colon, no biopsies taken, 5-year follow-up recommended due to history of tubular adenomas.    Family history pertinent for \"colitis\" in father.  No tobacco use, rare alcohol use, no drug use.  Surgical history pertinent for hernia repair in the 80s.    ROS:    10 point ROS neg other than the symptoms noted above in the HPI.    PREVIOUS ENDOSCOPY:  Reviewed, see HPI    PERTINENT RELEVANT IMAGING OR LABS:  Reviewed, see HPI    ALLERGIES:     Allergies   Allergen Reactions     Amoxicillin Rash     rash     Azithromycin Tinnitus     ?       PERTINENT MEDICATIONS:    Current Outpatient Medications:      brimonidine-timolol (COMBIGAN) 0.2-0.5 % ophthalmic solution, , Disp: , Rfl:      cholestyramine (QUESTRAN) 4 g packet, Take 1 packet (4 g) by mouth 2 times daily (with meals), Disp: 60 each, Rfl: 0     levothyroxine (SYNTHROID/LEVOTHROID) 50 MCG tablet, Take 1 tablet (50 mcg) by mouth daily, Disp: 90 tablet, Rfl: 3     pravastatin (PRAVACHOL) 20 MG tablet, Take 1 tablet (20 mg) by mouth daily, Disp: 90 tablet, Rfl: 3     XALATAN 0.005 % OP SOLN, None Entered, Disp: , Rfl:     Current Facility-Administered Medications:      sodium chloride (PF) 0.9% PF flush 3 mL, 3 mL, Intravenous, q1 min prn, Amol Rodríguez PA-C, 3 mL at 05/23/23 1132    PROBLEM LIST  Patient Active Problem List    Diagnosis Date Noted     Hyperlipidemia LDL goal <130 10/31/2010     Priority: High     Environmental allergies      Priority: Medium     " seasonal - spring       PVC (premature ventricular contraction)      Priority: Medium     Subclinical hypothyroidism      Priority: Medium     Adenomatous polyp of colon      Priority: Medium     Seasonal allergies      Priority: Medium     seasonal - spring       Glaucoma 05/01/2005     Priority: Medium     [facility] - bilateral       Advanced directives, counseling/discussion 12/05/2011     Priority: Low     Advance Directive Problem List Overview:   Name Relationship Phone    Primary Health Care Agent            Alternative Health Care Agent          Discussed advance care planning with patient; information given to patient to review. 12/5/2011     Left message for patient to call back to arrange facilitation if desired for completion of Advanced Care Directive. Peace Mullen LPN/Advanced Healthcare Planning Facilitator  12/13/11               PERTINENT PAST MEDICAL HISTORY:  Past Medical History:   Diagnosis Date     Adenomatous polyp of colon 9/09, 10/14    tubular - due 5 yrs     Environmental allergies     seasonal - spring     Glaucoma 05/2005    [facility] - bilateral     Hyperlipidemia LDL goal <130     borderline     PVC (premature ventricular contraction) 12/15     Subclinical hypothyroidism 9/09       PREVIOUS SURGERIES:  Past Surgical History:   Procedure Laterality Date     COLONOSCOPY  9/09, 10/14, 11/19    History of  tubular adenomatous polyps - due 5 yrs - internal hemorrhoids     STRESS ECHO (METRO)  3/07, 10/09, 12/11, 1/14    normal     SURGICAL HISTORY OF -  Bilateral 06/1987    HERNIA - JUNIOR     SURGICAL HISTORY OF -  Right 1996    RT SHOULDER - Dr Julien     VASECTOMY  1994       SOCIAL HISTORY:  Social History     Socioeconomic History     Marital status:      Spouse name: Maribell     Number of children: 3     Years of education: 16     Highest education level: Not on file   Occupational History     Employer: [employer]   Tobacco Use     Smoking status: Never      Smokeless tobacco: Never   Vaping Use     Vaping status: Never Used   Substance and Sexual Activity     Alcohol use: Yes     Types: 2 - 3 Standard drinks or equivalent per week     Comment: 2-3 drinks per week avg     Drug use: No     Sexual activity: Yes     Partners: Female     Birth control/protection: Male Surgical   Other Topics Concern      Service No     Blood Transfusions No     Caffeine Concern Yes     Comment: 1-2 cups qd     Occupational Exposure No     Hobby Hazards No     Sleep Concern No     Stress Concern No     Weight Concern No     Special Diet Not Asked     Back Care Not Asked     Exercise Yes     Comment: lifts and treadmill - Wii Fit - [employer] - 2-3/week     Bike Helmet Not Asked     Seat Belt Yes     Self-Exams Not Asked     Parent/sibling w/ CABG, MI or angioplasty before 65F 55M? Yes   Social History Narrative     Not on file     Social Determinants of Health     Financial Resource Strain: Not on file   Food Insecurity: Not on file   Transportation Needs: Not on file   Physical Activity: Not on file   Stress: Not on file   Social Connections: Not on file   Intimate Partner Violence: Not on file   Housing Stability: Not on file       FAMILY HISTORY:  Family History   Problem Relation Age of Onset     Other - See Comments Mother          in MVA at age 51     C.A.D. Father 61        age 61     Thyroid Disease Father      Coronary Artery Disease Sister      No Known Problems Sister      C.A.D. Brother 42        age 42     Depression Brother         suicide     Mental Illness Brother         anxiety     No Known Problems Maternal Grandmother      No Known Problems Maternal Grandfather      Cancer Paternal Grandmother         stomach ca     Cancer Paternal Grandfather         stomach ca     Pancreatic Cancer Cousin         1st cousin       Past/family/social history reviewed and no changes    PHYSICAL EXAMINATION:  Constitutional: aaox3, cooperative, pleasant, not  "dyspneic/diaphoretic, no acute distress  Vitals reviewed: /79 (BP Location: Left arm, Patient Position: Sitting, Cuff Size: Adult Regular)   Pulse 60   Ht 1.753 m (5' 9\")   Wt 84.5 kg (186 lb 3.2 oz)   SpO2 100%   BMI 27.50 kg/m    Wt:   Wt Readings from Last 2 Encounters:   06/08/23 84.5 kg (186 lb 3.2 oz)   05/23/23 84.8 kg (187 lb)      Eyes: Sclera anicteric/injected  CV: No edema  Respiratory: Unlabored breathing  Skin: warm, perfused, no jaundice  Psych: Normal affect  MSK: Normal gait                        Again, thank you for allowing me to participate in the care of your patient.        Sincerely,        Ryan Pradhan PA-C    "

## 2023-06-08 NOTE — NURSING NOTE
"Chief Complaint   Patient presents with     New Patient     New consult for diarrhea, indigestion and abdominal pain.  Referred per Amol Rodríguez PA-C     He requests these members of his care team be copied on today's visit information:  PCP: Reza Lynn    Referring Provider:  Amol Rodríguez PA-C  [address] [place] [address]    Vitals:    06/08/23 1104   BP: 125/79   BP Location: Left arm   Patient Position: Sitting   Cuff Size: Adult Regular   Pulse: 60   SpO2: 100%   Weight: 84.5 kg (186 lb 3.2 oz)   Height: 1.753 m (5' 9\")     Body mass index is 27.5 kg/m .    Medications were reconciled.        Oliva Caldwell CMA    "

## 2023-06-08 NOTE — PROGRESS NOTES
NEW PATIENT GI CLINIC VISIT    CC/REFERRING MD:  Amol Rodríguez  REASON FOR CONSULTATION:   Amol Rodríguez for   Chief Complaint   Patient presents with     New Patient     New consult for diarrhea, indigestion and abdominal pain.  Referred per Amol Rodríguez PA-C       ASSESSMENT/PLAN: Patient here for evaluation of subacute, bordering on chronic diarrhea over the past 6 weeks.  Has had limited relief with antidiarrheals.  Infectious testing has been negative thus far.  Reviewed other potential etiologies, which would include malabsorptive process, IBD, microscopic colitis, IBS.  We will trial cholestyramine and check labs and stool studies to rule out the above conditions.  May need to undergo diagnostic colonoscopy as well.  We will follow-up with patient after labs are in.      RTC PRN    Thank you for this consultation.  It was a pleasure to participate in the care of this patient; please contact us with any further questions.      20 minutes spent on the date of the encounter doing chart review, patient visit and documentation    This note was created with voice recognition software, and while reviewed for accuracy, typos may remain.     Ryan Pradhan PA-C  Division of Gastroenterology, Hepatology and Nutrition  [facility]  Tavo Johnson is a 62 year old male with a past medical history significant for hypothyroidism, hyperlipidemia that is seen as a new patient in the GI clinic today for subacute diarrhea.  Patient states that he developed persistent diarrhea the first week of May.  Prior to that, he would have intermittent diarrhea depending on what he ate, but it has been consistent for about 6 weeks now.  Describes [place] stool chart 5 through 7 stools.  No blood or mucus within the stool.  There can be associated abdominal cramping and bloating.  This has woken him from sleep at times.  Has trialed Pepto-Bismol, Imodium, and Lomotil for several days  "without significant relief.  Was seen in [facility] about a month ago, underwent infectious diarrheal testing that has came back negative.  Normal CBC, CMP, ESR as well.  Has not had any imaging.  Most recent colonoscopy was in 2019, normal-appearing colon, no biopsies taken, 5-year follow-up recommended due to history of tubular adenomas.    Family history pertinent for \"colitis\" in father.  No tobacco use, rare alcohol use, no drug use.  Surgical history pertinent for hernia repair in the 80s.    ROS:    10 point ROS neg other than the symptoms noted above in the HPI.    PREVIOUS ENDOSCOPY:  Reviewed, see HPI    PERTINENT RELEVANT IMAGING OR LABS:  Reviewed, see HPI    ALLERGIES:     Allergies   Allergen Reactions     Amoxicillin Rash     rash     Azithromycin Tinnitus     ?       PERTINENT MEDICATIONS:    Current Outpatient Medications:      brimonidine-timolol (COMBIGAN) 0.2-0.5 % ophthalmic solution, , Disp: , Rfl:      cholestyramine (QUESTRAN) 4 g packet, Take 1 packet (4 g) by mouth 2 times daily (with meals), Disp: 60 each, Rfl: 0     levothyroxine (SYNTHROID/LEVOTHROID) 50 MCG tablet, Take 1 tablet (50 mcg) by mouth daily, Disp: 90 tablet, Rfl: 3     pravastatin (PRAVACHOL) 20 MG tablet, Take 1 tablet (20 mg) by mouth daily, Disp: 90 tablet, Rfl: 3     XALATAN 0.005 % OP SOLN, None Entered, Disp: , Rfl:     Current Facility-Administered Medications:      sodium chloride (PF) 0.9% PF flush 3 mL, 3 mL, Intravenous, q1 min prn, Amol Rodríguez PA-C, 3 mL at 05/23/23 1132    PROBLEM LIST  Patient Active Problem List    Diagnosis Date Noted     Hyperlipidemia LDL goal <130 10/31/2010     Priority: High     Environmental allergies      Priority: Medium     seasonal - spring       PVC (premature ventricular contraction)      Priority: Medium     Subclinical hypothyroidism      Priority: Medium     Adenomatous polyp of colon      Priority: Medium     Seasonal allergies      Priority: Medium     seasonal - spring       " Glaucoma 05/01/2005     Priority: Medium     [place] [facility] - bilateral       Advanced directives, counseling/discussion 12/05/2011     Priority: Low     Advance Directive Problem List Overview:   Name Relationship Phone    Primary Health Care Agent            Alternative Health Care Agent          Discussed advance care planning with patient; information given to patient to review. 12/5/2011     Left message for patient to call back to arrange facilitation if desired for completion of Advanced Care Directive. Peace Mullen LPN/Advanced Healthcare Planning Facilitator  12/13/11               PERTINENT PAST MEDICAL HISTORY:  Past Medical History:   Diagnosis Date     Adenomatous polyp of colon 9/09, 10/14    tubular - due 5 yrs     Environmental allergies     seasonal - spring     Glaucoma 05/2005    [place] [facility] - bilateral     Hyperlipidemia LDL goal <130     borderline     PVC (premature ventricular contraction) 12/15     Subclinical hypothyroidism 9/09       PREVIOUS SURGERIES:  Past Surgical History:   Procedure Laterality Date     COLONOSCOPY  9/09, 10/14, 11/19    History of  tubular adenomatous polyps - due 5 yrs - internal hemorrhoids     STRESS ECHO ([facility]RO)  3/07, 10/09, 12/11, 1/14    normal     SURGICAL HISTORY OF -  Bilateral 06/1987    HERNIA - JUNIOR     SURGICAL HISTORY OF -  Right 1996    RT SHOULDER - Dr Julien     VASECTOMY  1994       SOCIAL HISTORY:  Social History     Socioeconomic History     Marital status:      Spouse name: Maribell     Number of children: 3     Years of education: 16     Highest education level: Not on file   Occupational History     Employer: [employer]   Tobacco Use     Smoking status: Never     Smokeless tobacco: Never   Vaping Use     Vaping status: Never Used   Substance and Sexual Activity     Alcohol use: Yes     Types: 2 - 3 Standard drinks or equivalent per week     Comment: 2-3 drinks per week avg     Drug use: No     Sexual activity: Yes      "Partners: Female     Birth control/protection: Male Surgical   Other Topics Concern      Service No     Blood Transfusions No     Caffeine Concern Yes     Comment: 1-2 cups qd     Occupational Exposure No     Hobby Hazards No     Sleep Concern No     Stress Concern No     Weight Concern No     Special Diet Not Asked     Back Care Not Asked     Exercise Yes     Comment: lifts and treadmill - Wii Fit - [employer] - 2-3/week     Bike Helmet Not Asked     Seat Belt Yes     Self-Exams Not Asked     Parent/sibling w/ CABG, MI or angioplasty before 65F 55M? Yes   Social History Narrative     Not on file     Social Determinants of Health     Financial Resource Strain: Not on file   Food Insecurity: Not on file   Transportation Needs: Not on file   Physical Activity: Not on file   Stress: Not on file   Social Connections: Not on file   Intimate Partner Violence: Not on file   Housing Stability: Not on file       FAMILY HISTORY:  Family History   Problem Relation Age of Onset     Other - See Comments Mother          in MVA at age 51     C.A.D. Father 61        age 61     Thyroid Disease Father      Coronary Artery Disease Sister      No Known Problems Sister      C.A.D. Brother 42        age 42     Depression Brother         suicide     Mental Illness Brother         anxiety     No Known Problems Maternal Grandmother      No Known Problems Maternal Grandfather      Cancer Paternal Grandmother         stomach ca     Cancer Paternal Grandfather         stomach ca     Pancreatic Cancer Cousin         1st cousin       Past/family/social history reviewed and no changes    PHYSICAL EXAMINATION:  Constitutional: aaox3, cooperative, pleasant, not dyspneic/diaphoretic, no acute distress  Vitals reviewed: /79 (BP Location: Left arm, Patient Position: Sitting, Cuff Size: Adult Regular)   Pulse 60   Ht 1.753 m (5' 9\")   Wt 84.5 kg (186 lb 3.2 oz)   SpO2 100%   BMI 27.50 kg/m    Wt:   Wt Readings from Last 2 " Encounters:   06/08/23 84.5 kg (186 lb 3.2 oz)   05/23/23 84.8 kg (187 lb)      Eyes: Sclera anicteric/injected  CV: No edema  Respiratory: Unlabored breathing  Skin: warm, perfused, no jaundice  Psych: Normal affect  MSK: Normal gait

## 2023-06-09 ENCOUNTER — APPOINTMENT (OUTPATIENT)
Dept: LAB | Facility: CLINIC | Age: 63
End: 2023-06-09
Payer: COMMERCIAL

## 2023-06-09 LAB
IGA SERPL-MCNC: 286 MG/DL (ref 84–499)
TTG IGA SER-ACNC: 0.5 U/ML
TTG IGG SER-ACNC: <0.6 U/ML

## 2023-06-09 PROCEDURE — 82705 FATS/LIPIDS FECES QUAL: CPT | Mod: 90 | Performed by: PHYSICIAN ASSISTANT

## 2023-06-09 PROCEDURE — 99000 SPECIMEN HANDLING OFFICE-LAB: CPT | Performed by: PHYSICIAN ASSISTANT

## 2023-06-09 PROCEDURE — 83993 ASSAY FOR CALPROTECTIN FECAL: CPT | Performed by: PHYSICIAN ASSISTANT

## 2023-06-09 PROCEDURE — 82653 EL-1 FECAL QUANTITATIVE: CPT | Performed by: PHYSICIAN ASSISTANT

## 2023-06-11 LAB
FAT STL QL: NORMAL
NEUTRAL FAT STL QL: NORMAL

## 2023-06-12 LAB — CALPROTECTIN STL-MCNT: 55.8 MG/KG (ref 0–49.9)

## 2023-06-12 NOTE — RESULT ENCOUNTER NOTE
Nicholas Vo,    Some of your labs have started to come in.  The blood tests screening for celiac disease, inflammation, and the stool test screening for increased fat are all normal.  The other 2 stool tests are still pending, I will reach out as soon as those are in.    Please let me know if you have any questions.    Sincerely,  Ryan Pradhan PA-C

## 2023-06-12 NOTE — RESULT ENCOUNTER NOTE
Noel Vo,    One of the stool studies is back, the calprotectin level was very mildly elevated.  This is an inflammatory marker test for the small bowel and colon.    This suggests that you may have been dealing with an infection in your colon.  Alternatively, it could indicate some ongoing inflammation in the colon.  If the other stool test is normal, I would suggest we think about bringing you in for colonoscopy to follow-up on this finding.    Please let me know what you think.    Sincerely,    Ryan Pradhan PA-C

## 2023-06-13 DIAGNOSIS — R19.7 DIARRHEA, UNSPECIFIED TYPE: Primary | ICD-10-CM

## 2023-06-14 LAB — ELASTASE PANC STL-MCNT: >800 UG/G

## 2023-06-15 NOTE — RESULT ENCOUNTER NOTE
Nicholas Vo,    The last stool test is back and it is normal.    As I mentioned in my previous message, if the increase in cholestyramine does not help form up your stools, I would recommend proceeding with colonoscopy for further evaluation.  I have placed an order for this and you should be receiving a call soon to get this scheduled.    Please let me know if you have any questions.    Sincerely,  Ryan Pradhan PA-C

## 2023-06-21 ENCOUNTER — MYC MEDICAL ADVICE (OUTPATIENT)
Dept: GASTROENTEROLOGY | Facility: CLINIC | Age: 63
End: 2023-06-21
Payer: COMMERCIAL

## 2023-06-21 DIAGNOSIS — K58.0 IRRITABLE BOWEL SYNDROME WITH DIARRHEA: Primary | ICD-10-CM

## 2023-06-22 ENCOUNTER — TELEPHONE (OUTPATIENT)
Dept: GASTROENTEROLOGY | Facility: CLINIC | Age: 63
End: 2023-06-22
Payer: COMMERCIAL

## 2023-06-22 NOTE — TELEPHONE ENCOUNTER
"Endoscopy Scheduling Screen    Have you had a positive Covid test in the last 14 days?  No    Are you active on MyChart?   Yes    What insurance is in the chart?  BC/BS: Schedule in ASC unless patient meets exclusion criteria.     Ordering/Referring Provider: Lorne   (If ordering provider performs procedure, schedule with ordering provider unless otherwise instructed. )    BMI: Estimated body mass index is 27.5 kg/m  as calculated from the following:    Height as of 6/8/23: 1.753 m (5' 9\").    Weight as of 6/8/23: 84.5 kg (186 lb 3.2 oz).     Sedation Ordered  moderate sedation.   If patient BMI > 50 do not schedule in ASC.    Are you taking any prescription medications for pain?   No    Are you taking methadone or Suboxone?  No    Do you have a history of malignant hyperthermia or adverse reaction to anesthesia?  No    (Females) Are you currently pregnant?   No     Have you been diagnosed or told you have pulmonary hypertension?   No    Do you have an LVAD?  No    Have you been told you have moderate to severe sleep apnea?  No    Have you been told you have COPD, asthma, or any other lung disease?  No    Do you have any heart conditions?  No     Have you ever had or are you awaiting a heart or lung transplant?   No    Have you had a stroke or transient ischemic attack (TIA aka \"mini stroke\" in the last 6 months?   No    Have you been diagnosed with or been told you have cirrhosis of the liver?   No    Are you currently on dialysis?   No    Do you need assistance transferring?   No    BMI: Estimated body mass index is 27.5 kg/m  as calculated from the following:    Height as of 6/8/23: 1.753 m (5' 9\").    Weight as of 6/8/23: 84.5 kg (186 lb 3.2 oz).     Is patients BMI > 40 and scheduling location [facility]?  No    Do you take the medication Phentermine, Ozempic or Wegovy?  No    Do you take the medication Naltrexone?  No    Do you take blood thinners?  No      Prep   Are you currently on dialysis or do you have chronic " kidney disease?  No    Do you have a diagnosis of diabetes?  No    Do you have a diagnosis of cystic fibrosis (CF)?  No    On a regular basis do you go 3 -5 days between bowel movements?  No    BMI > 40?  No    Preferred Pharmacy:      [place], [place] - [address]  [address] [facility] [address]  Phone: [phone] Fax: [phone] Alternate Fax: [phone], [phone]      Final Scheduling Details   Colonoscopy prep sent?  MiraLAX (No Mag Citrate)    Procedure scheduled  Colonoscopy    Surgeon:  Sarath     Date of procedure:  9/14/23     Schedule PAC:   No    Location  [facility]    Sedation   Moderate Sedation    Patient Reminders:    You will receive a call from a Nurse to review instructions and health history.  This assessment must be completed prior to your procedure.  Failure to complete the Nurse assessment may result in the procedure being cancelled.       On the day of your procedure, please designate an adult(s) who can drive you home stay with you for the next 24 hours. The medicines used in the exam will make you sleepy. You will not be able to drive.       You cannot take public transportation, ride share services, or non-medical taxi service without a responsible caregiver.  Medical transport services are allowed with the requirement that a responsible caregiver will receive you at your destination.  We require that drivers and caregivers are confirmed prior to your procedure.

## 2023-06-22 NOTE — TELEPHONE ENCOUNTER
Replied to [employer] message that provider stated that patient could stop the cholestyramine while he is taking the antibiotic.     Mariza Malloy RN

## 2023-07-14 ENCOUNTER — MYC MEDICAL ADVICE (OUTPATIENT)
Dept: GASTROENTEROLOGY | Facility: CLINIC | Age: 63
End: 2023-07-14
Payer: COMMERCIAL

## 2023-07-14 ENCOUNTER — TELEPHONE (OUTPATIENT)
Dept: GASTROENTEROLOGY | Facility: CLINIC | Age: 63
End: 2023-07-14
Payer: COMMERCIAL

## 2023-07-14 NOTE — TELEPHONE ENCOUNTER
Replied to [employer] message that scheduling team has been notified to reach out to patient re: earlier availability for scope scheduling and that inquiry re: budesonide will be forwarded to provider.     Mariza Malloy RN

## 2023-07-14 NOTE — TELEPHONE ENCOUNTER
Caller:   Reason for Reschedule/Cancellation (please be detailed, any staff messages or encounters to note?): MARIBELL WANTED HIM MOVED UP SOONER      Prior to reschedule please review:    Ordering Provider: MARIBELL    Sedation per order: CS    Does patient have any ASC Exclusions, please identify?:       Notes on Cancelled Procedure:    Procedure: Lower Endoscopy [Colonoscopy]     Date: 9/14    Location: [facility]; [facility] [address] [place] [address]    Surgeon: HODA      Rescheduled: Yes    Procedure: Lower Endoscopy [Colonoscopy]     Date: 7/25    Location: [facility]; [address] [place] [address]    Surgeon: HODA    Sedation Level Scheduled  CS,  Reason for Sedation Level ORDER    Prep/Instructions updated and sent: N     Send In - basket message to Panc - Isidro Pool if EUS  procedure is canceled or rescheduled: [ N/A, YES or NO]

## 2023-07-25 ENCOUNTER — HOSPITAL ENCOUNTER (OUTPATIENT)
Facility: CLINIC | Age: 63
Discharge: HOME | End: 2023-07-25
Attending: INTERNAL MEDICINE | Admitting: INTERNAL MEDICINE
Payer: COMMERCIAL

## 2023-07-25 VITALS
DIASTOLIC BLOOD PRESSURE: 76 MMHG | RESPIRATION RATE: 12 BRPM | HEART RATE: 64 BPM | SYSTOLIC BLOOD PRESSURE: 119 MMHG | OXYGEN SATURATION: 97 % | WEIGHT: 184 LBS | BODY MASS INDEX: 27.25 KG/M2 | HEIGHT: 69 IN

## 2023-07-25 LAB — COLONOSCOPY: NORMAL

## 2023-07-25 PROCEDURE — 88305 TISSUE EXAM BY PATHOLOGIST: CPT | Mod: TC | Performed by: INTERNAL MEDICINE

## 2023-07-25 PROCEDURE — 250N000011 HC RX IP 250 OP 636: Performed by: INTERNAL MEDICINE

## 2023-07-25 PROCEDURE — 99153 MOD SED SAME PHYS/QHP EA: CPT | Performed by: INTERNAL MEDICINE

## 2023-07-25 PROCEDURE — 45380 COLONOSCOPY AND BIOPSY: CPT | Performed by: INTERNAL MEDICINE

## 2023-07-25 PROCEDURE — G0500 MOD SEDAT ENDO SERVICE >5YRS: HCPCS | Performed by: INTERNAL MEDICINE

## 2023-07-25 PROCEDURE — 88305 TISSUE EXAM BY PATHOLOGIST: CPT | Mod: 26 | Performed by: PATHOLOGY

## 2023-07-25 RX ORDER — ONDANSETRON 2 MG/ML
4 INJECTION INTRAMUSCULAR; INTRAVENOUS
Status: DISCONTINUED | OUTPATIENT
Start: 2023-07-25 | End: 2023-07-25 | Stop reason: HOSPADM

## 2023-07-25 RX ORDER — NALOXONE HYDROCHLORIDE 0.4 MG/ML
0.4 INJECTION, SOLUTION INTRAMUSCULAR; INTRAVENOUS; SUBCUTANEOUS
Status: DISCONTINUED | OUTPATIENT
Start: 2023-07-25 | End: 2023-07-25 | Stop reason: HOSPADM

## 2023-07-25 RX ORDER — FENTANYL CITRATE 50 UG/ML
50-100 INJECTION, SOLUTION INTRAMUSCULAR; INTRAVENOUS EVERY 5 MIN PRN
Status: DISCONTINUED | OUTPATIENT
Start: 2023-07-25 | End: 2023-07-25 | Stop reason: HOSPADM

## 2023-07-25 RX ORDER — NALOXONE HYDROCHLORIDE 0.4 MG/ML
0.2 INJECTION, SOLUTION INTRAMUSCULAR; INTRAVENOUS; SUBCUTANEOUS
Status: DISCONTINUED | OUTPATIENT
Start: 2023-07-25 | End: 2023-07-25 | Stop reason: HOSPADM

## 2023-07-25 RX ORDER — FLUMAZENIL 0.1 MG/ML
0.2 INJECTION, SOLUTION INTRAVENOUS
Status: DISCONTINUED | OUTPATIENT
Start: 2023-07-25 | End: 2023-07-25 | Stop reason: HOSPADM

## 2023-07-25 RX ORDER — ONDANSETRON 4 MG/1
4 TABLET, ORALLY DISINTEGRATING ORAL EVERY 6 HOURS PRN
Status: DISCONTINUED | OUTPATIENT
Start: 2023-07-25 | End: 2023-07-25 | Stop reason: HOSPADM

## 2023-07-25 RX ORDER — ONDANSETRON 2 MG/ML
4 INJECTION INTRAMUSCULAR; INTRAVENOUS EVERY 6 HOURS PRN
Status: DISCONTINUED | OUTPATIENT
Start: 2023-07-25 | End: 2023-07-25 | Stop reason: HOSPADM

## 2023-07-25 RX ORDER — EPINEPHRINE 1 MG/ML
0.1 INJECTION, SOLUTION INTRAMUSCULAR; SUBCUTANEOUS
Status: DISCONTINUED | OUTPATIENT
Start: 2023-07-25 | End: 2023-07-25 | Stop reason: HOSPADM

## 2023-07-25 RX ORDER — SIMETHICONE 40MG/0.6ML
133 SUSPENSION, DROPS(FINAL DOSAGE FORM)(ML) ORAL
Status: DISCONTINUED | OUTPATIENT
Start: 2023-07-25 | End: 2023-07-25 | Stop reason: HOSPADM

## 2023-07-25 RX ORDER — PROCHLORPERAZINE MALEATE 5 MG
5 TABLET ORAL EVERY 6 HOURS PRN
Status: DISCONTINUED | OUTPATIENT
Start: 2023-07-25 | End: 2023-07-25 | Stop reason: HOSPADM

## 2023-07-25 RX ORDER — DIPHENHYDRAMINE HYDROCHLORIDE 50 MG/ML
25-50 INJECTION INTRAMUSCULAR; INTRAVENOUS
Status: DISCONTINUED | OUTPATIENT
Start: 2023-07-25 | End: 2023-07-25 | Stop reason: HOSPADM

## 2023-07-25 RX ORDER — LIDOCAINE 40 MG/G
CREAM TOPICAL
Status: DISCONTINUED | OUTPATIENT
Start: 2023-07-25 | End: 2023-07-25 | Stop reason: HOSPADM

## 2023-07-25 RX ORDER — ATROPINE SULFATE 0.1 MG/ML
1 INJECTION INTRAVENOUS
Status: DISCONTINUED | OUTPATIENT
Start: 2023-07-25 | End: 2023-07-25 | Stop reason: HOSPADM

## 2023-07-25 RX ADMIN — FENTANYL CITRATE 100 MCG: 50 INJECTION, SOLUTION INTRAMUSCULAR; INTRAVENOUS at 09:32

## 2023-07-25 RX ADMIN — MIDAZOLAM 1 MG: 1 INJECTION INTRAMUSCULAR; INTRAVENOUS at 09:56

## 2023-07-25 RX ADMIN — FENTANYL CITRATE 50 MCG: 50 INJECTION, SOLUTION INTRAMUSCULAR; INTRAVENOUS at 09:56

## 2023-07-25 RX ADMIN — MIDAZOLAM 2 MG: 1 INJECTION INTRAMUSCULAR; INTRAVENOUS at 09:32

## 2023-07-25 ASSESSMENT — ACTIVITIES OF DAILY LIVING (ADL): ADLS_ACUITY_SCORE: 35

## 2023-07-25 NOTE — H&P
Pre-Endoscopy History and Physical     Tavo Johnson MRN# [id number]   YOB: 1960 Age: 63 year old     Date of Procedure: 7/25/2023  Primary care provider: Reza Lynn  Type of Endoscopy: Colonoscopy with possible biopsy, possible polypectomy  Reason for Procedure: polyp  Type of Anesthesia Anticipated: Conscious Sedation    HPI:    Tavo is a 63 year old male who will be undergoing the above procedure.      A history and physical has been performed. The patient's medications and allergies have been reviewed. The risks and benefits of the procedure and the sedation options and risks were discussed with the patient.  All questions were answered and informed consent was obtained.      He denies a personal or family history of anesthesia complications or bleeding disorders.     Patient Active Problem List   Diagnosis    Seasonal allergies    Adenomatous polyp of colon    Subclinical hypothyroidism    Hyperlipidemia LDL goal <130    Advanced directives, counseling/discussion    PVC (premature ventricular contraction)    Environmental allergies    Glaucoma        Past Medical History:   Diagnosis Date    Adenomatous polyp of colon 9/09, 10/14    tubular - due 5 yrs    Environmental allergies     seasonal - spring    Glaucoma 05/2005    [facility] [place] [facility] - bilateral    Hyperlipidemia LDL goal <130     borderline    PVC (premature ventricular contraction) 12/15    Subclinical hypothyroidism 9/09        Past Surgical History:   Procedure Laterality Date    COLONOSCOPY  9/09, 10/14, 11/19    History of  tubular adenomatous polyps - due 5 yrs - internal hemorrhoids    STRESS ECHO ([facility])  3/07, 10/09, 12/11, 1/14    normal    SURGICAL HISTORY OF -  Bilateral 06/1987    HERNIA - JUNIOR    SURGICAL HISTORY OF -  Right 1996    RT SHOULDER - Dr Julien    VASECTOMY  1994       Social History     Tobacco Use    Smoking status: Never    Smokeless tobacco: Never   Substance Use Topics    Alcohol use: Yes     Types: 2 - 3  "Standard drinks or equivalent per week     Comment: 1 drink per week       Family History   Problem Relation Age of Onset    Other - See Comments Mother          in MVA at age 51    C.A.D. Father 61        age 61    Thyroid Disease Father     No Known Problems Maternal Grandmother     No Known Problems Maternal Grandfather     Cancer Paternal Grandmother         stomach ca    Cancer Paternal Grandfather         stomach ca    C.A.D. Brother 42        age 42    Depression Brother         suicide    Mental Illness Brother         anxiety    Coronary Artery Disease Sister     No Known Problems Sister     Pancreatic Cancer Cousin         1st cousin    Colon Cancer No family hx of        Prior to Admission medications    Medication Sig Start Date End Date Taking? Authorizing Provider   brimonidine-timolol (COMBIGAN) 0.2-0.5 % ophthalmic solution  20  Yes Reported, Patient   levothyroxine (SYNTHROID/LEVOTHROID) 50 MCG tablet Take 1 tablet (50 mcg) by mouth daily 5/15/23  Yes Reza Lynn MD   pravastatin (PRAVACHOL) 20 MG tablet Take 1 tablet (20 mg) by mouth daily 5/15/23  Yes Reza Lynn MD   XALATAN 0.005 % OP SOLN None Entered   Yes Reported, Patient       Allergies   Allergen Reactions    Amoxicillin Rash     rash    Azithromycin Tinnitus     ?        REVIEW OF SYSTEMS:   5 point ROS negative except as noted above in HPI, including Gen., Resp., CV, GI &  system review.    PHYSICAL EXAM:   There were no vitals taken for this visit. Estimated body mass index is 27.5 kg/m  as calculated from the following:    Height as of 23: 1.753 m (5' 9\").    Weight as of 23: 84.5 kg (186 lb 3.2 oz).   GENERAL APPEARANCE: alert, and oriented  MENTAL STATUS: alert  AIRWAY EXAM: Mallampatti Class I (visualization of the soft palate, fauces, uvula, anterior and posterior pillars)  RESP: lungs clear to auscultation - no rales, rhonchi or wheezes  CV: regular rates and rhythm  DIAGNOSTICS:    Not indicated    IMPRESSION "   ASA Class 2 - Mild systemic disease    PLAN:   Plan for Colonoscopy with possible biopsy, possible polypectomy. We discussed the risks, benefits and alternatives and the patient wished to proceed.    The above has been forwarded to the consulting provider.      Signed Electronically by: Carlos Alberto Clemens MD  July 25, 2023

## 2023-07-26 LAB
PATH REPORT.COMMENTS IMP SPEC: NORMAL
PATH REPORT.COMMENTS IMP SPEC: NORMAL
PATH REPORT.FINAL DX SPEC: NORMAL
PATH REPORT.GROSS SPEC: NORMAL
PATH REPORT.MICROSCOPIC SPEC OTHER STN: NORMAL
PATH REPORT.RELEVANT HX SPEC: NORMAL
PHOTO IMAGE: NORMAL

## 2023-07-31 NOTE — RESULT ENCOUNTER NOTE
Nicholas Vo,    The report and subsequent biopsies from your colonoscopy are available for you to review.  Overall, there were no abnormal findings.    How have things been going?  Any changes to your stool since we last spoke?    Sincerely,    Ryan Pradhan PA-C

## 2023-08-08 ENCOUNTER — OFFICE VISIT (OUTPATIENT)
Dept: GASTROENTEROLOGY | Facility: CLINIC | Age: 63
End: 2023-08-08
Attending: PHYSICIAN ASSISTANT
Payer: COMMERCIAL

## 2023-08-08 VITALS
HEART RATE: 54 BPM | DIASTOLIC BLOOD PRESSURE: 72 MMHG | OXYGEN SATURATION: 99 % | WEIGHT: 184.3 LBS | HEIGHT: 69 IN | BODY MASS INDEX: 27.3 KG/M2 | SYSTOLIC BLOOD PRESSURE: 125 MMHG

## 2023-08-08 DIAGNOSIS — K58.0 IRRITABLE BOWEL SYNDROME WITH DIARRHEA: Primary | ICD-10-CM

## 2023-08-08 PROBLEM — K58.9 IRRITABLE BOWEL SYNDROME: Status: ACTIVE | Noted: 2023-08-08

## 2023-08-08 PROCEDURE — 99214 OFFICE O/P EST MOD 30 MIN: CPT | Performed by: PHYSICIAN ASSISTANT

## 2023-08-08 RX ORDER — DICYCLOMINE HCL 20 MG
20 TABLET ORAL 4 TIMES DAILY PRN
Qty: 30 TABLET | Refills: 0 | Status: SHIPPED | OUTPATIENT
Start: 2023-08-08 | End: 2023-09-18

## 2023-08-08 ASSESSMENT — PAIN SCALES - GENERAL: PAINLEVEL: NO PAIN (0)

## 2023-08-08 NOTE — LETTER
8/8/2023         RE: Tavo Johnson  [address] [facility] [address]        Dear Colleague,    Thank you for referring your patient, Tavo Johnson, to the [facility]. Please see a copy of my visit note below.    FOLLOW UP GI CLINIC VISIT    CC/REFERRING MD:  Ryan Pradhan  REASON FOR CONSULTATION:   Ryan Pradhan for   Chief Complaint   Patient presents with     Follow Up     2 month follow up for diarrhea.  F/U after colonoscopy (07/25/2023)       ASSESSMENT/PLAN: Patient is here for follow-up of chronic diarrhea.  We reviewed his work-up thus far, which has been reassuring.  He has seen some improvement with use of Metamucil.  I suspect the diagnosis here is likely IBS-D.  I am going to have him try some Bentyl in addition to the Metamucil to see if we can get some more consistent firm and less frequent stools.  We discussed the role of diet and there may be some food intolerance.  He will consider eliminating dairy and we may also have him try low FODMAP as well.  I will reach out to him in a couple weeks over MyChart to get an update on his symptoms.      RTC PRN    Thank you for this consultation.  It was a pleasure to participate in the care of this patient; please contact us with any further questions.      25 minutes spent on the date of the encounter doing chart review, patient visit, and documentation    This note was created with voice recognition software, and while reviewed for accuracy, typos may remain.     Ryan Pradhan PA-C  Division of Gastroenterology, Hepatology and Nutrition  [facility]  Tavo Johnson is a 63 year old male with a past medical history significant for hypothyroidism and hyperlipidemia that is seen for follow up in the GI clinic today for chronic diarrhea.  To review, I initially saw patient about 2 months ago for ongoing diarrhea.  It had originally developed in May of this year.   He had had some brief spells of diarrhea in the past depending on what he ate, but the persistent diarrhea had never occurred before.  He described having [place] stool chart 5 through 7, 4-5+ times daily.  He had experienced some abdominal bloating and cramping as well.  It had woken him from sleep at times.  Trials of Pepto-Bismol, Imodium, and Lomotil did not provide significant relief.  He had normal inflammatory marker testing, infectious diarrheal testing.  I sent him for colonoscopy to rule out microscopic colitis, he had this done last month and this did not show any signs of IBD and random biopsies from the colon were normal.    He started taking Metamucil and this has formed his stools up to some degree, now having [place] stool chart 2 through 4.  He is still having frequency, going 5+ times per day.  There is still some gas and cramping.  He follows up today to discuss next steps.    ROS:    10 point ROS neg other than the symptoms noted above in the HPI.    PREVIOUS ENDOSCOPY:  Reviewed, see HPI    PERTINENT RELEVANT IMAGING OR LABS:  Reviewed    ALLERGIES:     Allergies   Allergen Reactions     Amoxicillin Rash     rash     Azithromycin Tinnitus     ?       PERTINENT MEDICATIONS:    Current Outpatient Medications:      brimonidine-timolol (COMBIGAN) 0.2-0.5 % ophthalmic solution, , Disp: , Rfl:      dicyclomine (BENTYL) 20 MG tablet, Take 1 tablet (20 mg) by mouth 4 times daily as needed (abdominal pain, diarrhea), Disp: 30 tablet, Rfl: 0     levothyroxine (SYNTHROID/LEVOTHROID) 50 MCG tablet, Take 1 tablet (50 mcg) by mouth daily, Disp: 90 tablet, Rfl: 3     pravastatin (PRAVACHOL) 20 MG tablet, Take 1 tablet (20 mg) by mouth daily, Disp: 90 tablet, Rfl: 3     XALATAN 0.005 % OP SOLN, None Entered, Disp: , Rfl:     Current Facility-Administered Medications:      sodium chloride (PF) 0.9% PF flush 3 mL, 3 mL, Intravenous, q1 min prn, Amol Rodríguez PA-C, 3 mL at 05/23/23 3108    PROBLEM LIST  Patient  Active Problem List    Diagnosis Date Noted     Hyperlipidemia LDL goal <130 10/31/2010     Priority: High     Environmental allergies      Priority: Medium     seasonal - spring       PVC (premature ventricular contraction)      Priority: Medium     Subclinical hypothyroidism      Priority: Medium     Adenomatous polyp of colon      Priority: Medium     Seasonal allergies      Priority: Medium     seasonal - spring       Glaucoma 05/01/2005     Priority: Medium     [place] [facility] - bilateral       Advanced directives, counseling/discussion 12/05/2011     Priority: Low     Advance Directive Problem List Overview:   Name Relationship Phone    Primary Health Care Agent            Alternative Health Care Agent          Discussed advance care planning with patient; information given to patient to review. 12/5/2011     Left message for patient to call back to arrange facilitation if desired for completion of Advanced Care Directive. Peace Mullen LPN/Advanced Healthcare Planning Facilitator  12/13/11               PERTINENT PAST MEDICAL HISTORY:  Past Medical History:   Diagnosis Date     Adenomatous polyp of colon 9/09, 10/14    tubular - due 5 yrs     Environmental allergies     seasonal - spring     Glaucoma 05/2005    [facility] [place] [facility] - bilateral     Hyperlipidemia LDL goal <130     borderline     PVC (premature ventricular contraction) 12/15     Subclinical hypothyroidism 9/09       PREVIOUS SURGERIES:  Past Surgical History:   Procedure Laterality Date     COLONOSCOPY  9/09, 10/14, 11/19    History of  tubular adenomatous polyps - due 5 yrs - internal hemorrhoids     COLONOSCOPY N/A 7/25/2023    Procedure: Colonoscopy random biopsies using cold bx forcep;  Surgeon: Carlos Alberto Clemens MD;  Location:  GI     STRESS ECHO ([facility])  3/07, 10/09, 12/11, 1/14    normal     SURGICAL HISTORY OF -  Bilateral 06/1987    HERNIA - JUNIOR     SURGICAL HISTORY OF -  Right 1996    RT SHOULDER - Dr Julien     VASECTOMY  1994        SOCIAL HISTORY:  Social History     Socioeconomic History     Marital status:      Spouse name: Maribell     Number of children: 3     Years of education: 16     Highest education level: Not on file   Occupational History     Employer: [employer]   Tobacco Use     Smoking status: Never     Smokeless tobacco: Never   Vaping Use     Vaping Use: Never used   Substance and Sexual Activity     Alcohol use: Yes     Types: 2 - 3 Standard drinks or equivalent per week     Comment: 1 drink per week     Drug use: No     Sexual activity: Yes     Partners: Female     Birth control/protection: Male Surgical   Other Topics Concern      Service No     Blood Transfusions No     Caffeine Concern Yes     Comment: 1-2 cups qd     Occupational Exposure No     Hobby Hazards No     Sleep Concern No     Stress Concern No     Weight Concern No     Special Diet Not Asked     Back Care Not Asked     Exercise Yes     Comment: lifts and treadmill - Wii Fit - snap fitness - 2-3/week     Bike Helmet Not Asked     Seat Belt Yes     Self-Exams Not Asked     Parent/sibling w/ CABG, MI or angioplasty before 65F 55M? Yes   Social History Narrative     Not on file     Social Determinants of Health     Financial Resource Strain: Not on file   Food Insecurity: Not on file   Transportation Needs: Not on file   Physical Activity: Not on file   Stress: Not on file   Social Connections: Not on file   Intimate Partner Violence: Not on file   Housing Stability: Not on file       FAMILY HISTORY:  Family History   Problem Relation Age of Onset     Other - See Comments Mother          in MVA at age 51     C.A.D. Father 61        age 61     Thyroid Disease Father      No Known Problems Maternal Grandmother      No Known Problems Maternal Grandfather      Cancer Paternal Grandmother         stomach ca     Cancer Paternal Grandfather         stomach ca     C.A.D. Brother 42        age 42     Depression Brother         suicide     Mental  "Illness Brother         anxiety     Coronary Artery Disease Sister      No Known Problems Sister      Pancreatic Cancer Cousin         1st cousin     Colon Cancer No family hx of        Past/family/social history reviewed and no changes    PHYSICAL EXAMINATION:  Constitutional: aaox3, cooperative, pleasant, not dyspneic/diaphoretic, no acute distress  Vitals reviewed: /72 (BP Location: Left arm, Patient Position: Sitting, Cuff Size: Adult Regular)   Pulse 54   Ht 1.753 m (5' 9\")   Wt 83.6 kg (184 lb 4.8 oz)   SpO2 99%   BMI 27.22 kg/m    Wt:   Wt Readings from Last 2 Encounters:   08/08/23 83.6 kg (184 lb 4.8 oz)   07/25/23 83.5 kg (184 lb)      Eyes: Sclera anicteric/injected  CV: No edema  Respiratory: Unlabored breathing  Skin: warm, perfused, no jaundice  Psych: Normal affect  MSK: Normal gait                      Again, thank you for allowing me to participate in the care of your patient.        Sincerely,        Ryan Pradhan PA-C  "

## 2023-08-08 NOTE — PROGRESS NOTES
FOLLOW UP GI CLINIC VISIT    CC/REFERRING MD:  Ryan Pradhan  REASON FOR CONSULTATION:   Ryan Pradhan for   Chief Complaint   Patient presents with    Follow Up     2 month follow up for diarrhea.  F/U after colonoscopy (07/25/2023)       ASSESSMENT/PLAN: Patient is here for follow-up of chronic diarrhea.  We reviewed his work-up thus far, which has been reassuring.  He has seen some improvement with use of Metamucil.  I suspect the diagnosis here is likely IBS-D.  I am going to have him try some Bentyl in addition to the Metamucil to see if we can get some more consistent firm and less frequent stools.  We discussed the role of diet and there may be some food intolerance.  He will consider eliminating dairy and we may also have him try low FODMAP as well.  I will reach out to him in a couple weeks over MyChart to get an update on his symptoms.      RTC PRN    Thank you for this consultation.  It was a pleasure to participate in the care of this patient; please contact us with any further questions.      25 minutes spent on the date of the encounter doing chart review, patient visit, and documentation    This note was created with voice recognition software, and while reviewed for accuracy, typos may remain.     Ryan Pradhan PA-C  Division of Gastroenterology, Hepatology and Nutrition  [facility]  Tavo Johnson is a 63 year old male with a past medical history significant for hypothyroidism and hyperlipidemia that is seen for follow up in the GI clinic today for chronic diarrhea.  To review, I initially saw patient about 2 months ago for ongoing diarrhea.  It had originally developed in May of this year.  He had had some brief spells of diarrhea in the past depending on what he ate, but the persistent diarrhea had never occurred before.  He described having [place] stool chart 5 through 7, 4-5+ times daily.  He had experienced some abdominal bloating and cramping  as well.  It had woken him from sleep at times.  Trials of Pepto-Bismol, Imodium, and Lomotil did not provide significant relief.  He had normal inflammatory marker testing, infectious diarrheal testing.  I sent him for colonoscopy to rule out microscopic colitis, he had this done last month and this did not show any signs of IBD and random biopsies from the colon were normal.    He started taking Metamucil and this has formed his stools up to some degree, now having [place] stool chart 2 through 4.  He is still having frequency, going 5+ times per day.  There is still some gas and cramping.  He follows up today to discuss next steps.    ROS:    10 point ROS neg other than the symptoms noted above in the HPI.    PREVIOUS ENDOSCOPY:  Reviewed, see HPI    PERTINENT RELEVANT IMAGING OR LABS:  Reviewed    ALLERGIES:     Allergies   Allergen Reactions    Amoxicillin Rash     rash    Azithromycin Tinnitus     ?       PERTINENT MEDICATIONS:    Current Outpatient Medications:     brimonidine-timolol (COMBIGAN) 0.2-0.5 % ophthalmic solution, , Disp: , Rfl:     dicyclomine (BENTYL) 20 MG tablet, Take 1 tablet (20 mg) by mouth 4 times daily as needed (abdominal pain, diarrhea), Disp: 30 tablet, Rfl: 0    levothyroxine (SYNTHROID/LEVOTHROID) 50 MCG tablet, Take 1 tablet (50 mcg) by mouth daily, Disp: 90 tablet, Rfl: 3    pravastatin (PRAVACHOL) 20 MG tablet, Take 1 tablet (20 mg) by mouth daily, Disp: 90 tablet, Rfl: 3    XALATAN 0.005 % OP SOLN, None Entered, Disp: , Rfl:     Current Facility-Administered Medications:     sodium chloride (PF) 0.9% PF flush 3 mL, 3 mL, Intravenous, q1 min prn, Amol Rodríguez PA-C, 3 mL at 05/23/23 1132    PROBLEM LIST  Patient Active Problem List    Diagnosis Date Noted    Hyperlipidemia LDL goal <130 10/31/2010     Priority: High    Environmental allergies      Priority: Medium     seasonal - spring      PVC (premature ventricular contraction)      Priority: Medium    Subclinical  hypothyroidism      Priority: Medium    Adenomatous polyp of colon      Priority: Medium    Seasonal allergies      Priority: Medium     seasonal - spring      Glaucoma 05/01/2005     Priority: Medium     [facility] - bilateral      Advanced directives, counseling/discussion 12/05/2011     Priority: Low     Advance Directive Problem List Overview:   Name Relationship Phone    Primary Health Care Agent            Alternative Health Care Agent          Discussed advance care planning with patient; information given to patient to review. 12/5/2011     Left message for patient to call back to arrange facilitation if desired for completion of Advanced Care Directive. Peace Mullen LPN/Advanced Healthcare Planning Facilitator  12/13/11               PERTINENT PAST MEDICAL HISTORY:  Past Medical History:   Diagnosis Date    Adenomatous polyp of colon 9/09, 10/14    tubular - due 5 yrs    Environmental allergies     seasonal - spring    Glaucoma 05/2005    [facility] - bilateral    Hyperlipidemia LDL goal <130     borderline    PVC (premature ventricular contraction) 12/15    Subclinical hypothyroidism 9/09       PREVIOUS SURGERIES:  Past Surgical History:   Procedure Laterality Date    COLONOSCOPY  9/09, 10/14, 11/19    History of  tubular adenomatous polyps - due 5 yrs - internal hemorrhoids    COLONOSCOPY N/A 7/25/2023    Procedure: Colonoscopy random biopsies using cold bx forcep;  Surgeon: Carlos Alberto Clemens MD;  Location:  GI    STRESS ECHO ([facility])  3/07, 10/09, 12/11, 1/14    normal    SURGICAL HISTORY OF -  Bilateral 06/1987    HERNIA - JUNIOR    SURGICAL HISTORY OF -  Right 1996    RT SHOULDER - Dr Julien    VASECTOMY  1994       SOCIAL HISTORY:  Social History     Socioeconomic History    Marital status:      Spouse name: Maribell    Number of children: 3    Years of education: 16    Highest education level: Not on file   Occupational History     Employer: [employer]   Tobacco Use    Smoking  status: Never    Smokeless tobacco: Never   Vaping Use    Vaping Use: Never used   Substance and Sexual Activity    Alcohol use: Yes     Types: 2 - 3 Standard drinks or equivalent per week     Comment: 1 drink per week    Drug use: No    Sexual activity: Yes     Partners: Female     Birth control/protection: Male Surgical   Other Topics Concern     Service No    Blood Transfusions No    Caffeine Concern Yes     Comment: 1-2 cups qd    Occupational Exposure No    Hobby Hazards No    Sleep Concern No    Stress Concern No    Weight Concern No    Special Diet Not Asked    Back Care Not Asked    Exercise Yes     Comment: lifts and treadmill - Wii Fit - [employer] - 2-3/week    Bike Helmet Not Asked    Seat Belt Yes    Self-Exams Not Asked    Parent/sibling w/ CABG, MI or angioplasty before 65F 55M? Yes   Social History Narrative    Not on file     Social Determinants of Health     Financial Resource Strain: Not on file   Food Insecurity: Not on file   Transportation Needs: Not on file   Physical Activity: Not on file   Stress: Not on file   Social Connections: Not on file   Intimate Partner Violence: Not on file   Housing Stability: Not on file       FAMILY HISTORY:  Family History   Problem Relation Age of Onset    Other - See Comments Mother          in MVA at age 51    C.A.D. Father 61        age 61    Thyroid Disease Father     No Known Problems Maternal Grandmother     No Known Problems Maternal Grandfather     Cancer Paternal Grandmother         stomach ca    Cancer Paternal Grandfather         stomach ca    C.A.D. Brother 42        age 42    Depression Brother         suicide    Mental Illness Brother         anxiety    Coronary Artery Disease Sister     No Known Problems Sister     Pancreatic Cancer Cousin         1st cousin    Colon Cancer No family hx of        Past/family/social history reviewed and no changes    PHYSICAL EXAMINATION:  Constitutional: aaox3, cooperative, pleasant, not  "dyspneic/diaphoretic, no acute distress  Vitals reviewed: /72 (BP Location: Left arm, Patient Position: Sitting, Cuff Size: Adult Regular)   Pulse 54   Ht 1.753 m (5' 9\")   Wt 83.6 kg (184 lb 4.8 oz)   SpO2 99%   BMI 27.22 kg/m    Wt:   Wt Readings from Last 2 Encounters:   08/08/23 83.6 kg (184 lb 4.8 oz)   07/25/23 83.5 kg (184 lb)      Eyes: Sclera anicteric/injected  CV: No edema  Respiratory: Unlabored breathing  Skin: warm, perfused, no jaundice  Psych: Normal affect  MSK: Normal gait                    "

## 2023-08-08 NOTE — NURSING NOTE
"Chief Complaint   Patient presents with    Follow Up     2 month follow up for diarrhea.  F/U after colonoscopy (07/25/2023)     He requests these members of his care team be copied on today's visit information:  PCP: Reza Lynn    Vitals:    08/08/23 1351   BP: 125/72   BP Location: Left arm   Patient Position: Sitting   Cuff Size: Adult Regular   Pulse: 54   SpO2: 99%   Weight: 83.6 kg (184 lb 4.8 oz)   Height: 1.753 m (5' 9\")     Body mass index is 27.22 kg/m .    Medications were reconciled.    Does patient need any medication refills at today's visit? None        Oliva Caldwell CMA    "

## 2023-09-08 ENCOUNTER — OFFICE VISIT (OUTPATIENT)
Dept: FAMILY MEDICINE | Facility: CLINIC | Age: 63
End: 2023-09-08
Payer: COMMERCIAL

## 2023-09-08 VITALS
DIASTOLIC BLOOD PRESSURE: 82 MMHG | HEIGHT: 69 IN | BODY MASS INDEX: 26.66 KG/M2 | WEIGHT: 180 LBS | HEART RATE: 66 BPM | SYSTOLIC BLOOD PRESSURE: 134 MMHG | RESPIRATION RATE: 14 BRPM | OXYGEN SATURATION: 99 % | TEMPERATURE: 97.8 F

## 2023-09-08 DIAGNOSIS — N45.1 EPIDIDYMITIS, RIGHT: Primary | ICD-10-CM

## 2023-09-08 PROCEDURE — 99213 OFFICE O/P EST LOW 20 MIN: CPT | Performed by: FAMILY MEDICINE

## 2023-09-08 RX ORDER — CIPROFLOXACIN 500 MG/1
500 TABLET, FILM COATED ORAL 2 TIMES DAILY
Qty: 20 TABLET | Refills: 0 | Status: SHIPPED | OUTPATIENT
Start: 2023-09-08 | End: 2023-09-18

## 2023-09-08 NOTE — PROGRESS NOTES
"  Assessment & Plan   See after visit summary and result note for helpful information and advice given to patient.    Epididymitis, right  - ciprofloxacin (CIPRO) 500 MG tablet  Dispense: 20 tablet; Refill: 0                   DO DAMARIS [facility]    Steven Vo is a 63 year old, presenting for the following health issues:  Groin Pain      HPI           Patient symptoms have been the same for 2 days. Mild-Moderate. No injury. Works out, but nothing new.    Hx of this in the past, 10 years ago. Was an infection.   Review of Systems   Patient is seen for primary reason of right scrotal discomfort and concern for possible infection.    Patient has noted tenderness and swelling to right testicle.     No urinary concerns such as urinary frequency or dysuria.     No urethral discharge.     No saddle area pain or back pain.     Has pain in right scrotal area which seems to radiate in to right lower abdomen. Symptoms are similar to prior epididymitis treated in 2015 with ciprofloxacin, which gave good relief.     Patient is  and monogamous.      Objective    /82   Pulse 66   Temp 97.8  F (36.6  C) (Oral)   Resp 14   Ht 1.753 m (5' 9\")   Wt 81.6 kg (180 lb)   SpO2 99%   BMI 26.58 kg/m    Body mass index is 26.58 kg/m .  Physical Exam   Vital signs reviewed.  Patient is in no acute appearing distress.  Breathing appears nonlabored.  Patient is alert and oriented ×3.  Patient is very pleasant, making good eye contact and responding with clear fluent speech.  Patient gets up and down from exam room chair and table without visible difficulty.    Heart: Heart rate is regular without murmur.    Lungs: Lungs are clear to auscultation with good airflow bilaterally.    Abdomen soft, there is tenderness in the right distal lower quadrant without guarding or rigidity, no abnormal masses or organomegally. Normal bowel sounds.    Back: No areas of tenderness.    Genital exam: No " abnormal bulging in previous surgical areas for bilateral inguinal hernia repair.  No abnormal bulging with coughing and bilateral scrotal areas.  I do not palpate or visualize abnormal testicular edema, with scrotal exam being most significant for tenderness over the posterior and superior aspect of right testicle, and just above the right testicle characteristic of epididymitis.

## 2023-09-18 ENCOUNTER — MYC MEDICAL ADVICE (OUTPATIENT)
Dept: GASTROENTEROLOGY | Facility: CLINIC | Age: 63
End: 2023-09-18
Payer: COMMERCIAL

## 2023-09-18 DIAGNOSIS — K58.0 IRRITABLE BOWEL SYNDROME WITH DIARRHEA: ICD-10-CM

## 2023-09-18 RX ORDER — DICYCLOMINE HCL 20 MG
20 TABLET ORAL 4 TIMES DAILY PRN
Qty: 90 TABLET | Refills: 1 | Status: SHIPPED | OUTPATIENT
Start: 2023-09-18 | End: 2024-04-03

## 2023-09-18 NOTE — TELEPHONE ENCOUNTER
Dicyclomine (bentyl) 20mg tablet. Take 1 tablet (20mg) by mouth 4 times a day as needed (abdominal pain, diarrhea).    Last Written Prescription Date:  8/8/23  Last Fill Quantity: 30,  # refills: 0   Last office visit: 8/8/2023   Future Office Visit:  No visits scheduled    Refill request sent to provider per the contra indication with glaucoma eye drops per pharmacist  Cailin Farrell RN

## 2023-11-13 ENCOUNTER — LAB (OUTPATIENT)
Dept: LAB | Facility: CLINIC | Age: 63
End: 2023-11-13
Payer: COMMERCIAL

## 2023-11-13 DIAGNOSIS — E03.8 SUBCLINICAL HYPOTHYROIDISM: ICD-10-CM

## 2023-11-13 DIAGNOSIS — E78.5 HYPERLIPIDEMIA LDL GOAL <130: ICD-10-CM

## 2023-11-13 DIAGNOSIS — Z51.81 MEDICATION MONITORING ENCOUNTER: ICD-10-CM

## 2023-11-13 DIAGNOSIS — R73.09 ELEVATED GLUCOSE: ICD-10-CM

## 2023-11-13 LAB
CHOLEST SERPL-MCNC: 174 MG/DL
FASTING STATUS PATIENT QL REPORTED: YES
GLUCOSE SERPL-MCNC: 102 MG/DL (ref 70–99)
HBA1C MFR BLD: 5.6 % (ref 0–5.6)
HDLC SERPL-MCNC: 43 MG/DL
LDLC SERPL CALC-MCNC: 109 MG/DL
NONHDLC SERPL-MCNC: 131 MG/DL
T4 FREE SERPL-MCNC: 1.25 NG/DL (ref 0.9–1.7)
TRIGL SERPL-MCNC: 109 MG/DL
TSH SERPL DL<=0.005 MIU/L-ACNC: 3.22 UIU/ML (ref 0.3–4.2)

## 2023-11-13 PROCEDURE — 82947 ASSAY GLUCOSE BLOOD QUANT: CPT

## 2023-11-13 PROCEDURE — 84439 ASSAY OF FREE THYROXINE: CPT

## 2023-11-13 PROCEDURE — 84443 ASSAY THYROID STIM HORMONE: CPT

## 2023-11-13 PROCEDURE — 36415 COLL VENOUS BLD VENIPUNCTURE: CPT

## 2023-11-13 PROCEDURE — 83036 HEMOGLOBIN GLYCOSYLATED A1C: CPT

## 2023-11-13 PROCEDURE — 80061 LIPID PANEL: CPT

## 2023-11-30 ENCOUNTER — PATIENT OUTREACH (OUTPATIENT)
Dept: GASTROENTEROLOGY | Facility: CLINIC | Age: 63
End: 2023-11-30
Payer: COMMERCIAL

## 2024-01-09 ENCOUNTER — OFFICE VISIT (OUTPATIENT)
Dept: FAMILY MEDICINE | Facility: CLINIC | Age: 64
End: 2024-01-09
Payer: COMMERCIAL

## 2024-01-09 VITALS
BODY MASS INDEX: 26.22 KG/M2 | DIASTOLIC BLOOD PRESSURE: 72 MMHG | HEIGHT: 69 IN | HEART RATE: 69 BPM | WEIGHT: 177 LBS | OXYGEN SATURATION: 98 % | TEMPERATURE: 96.9 F | SYSTOLIC BLOOD PRESSURE: 110 MMHG | RESPIRATION RATE: 18 BRPM

## 2024-01-09 DIAGNOSIS — N45.1 RIGHT EPIDIDYMITIS: Primary | ICD-10-CM

## 2024-01-09 PROCEDURE — 99213 OFFICE O/P EST LOW 20 MIN: CPT | Performed by: FAMILY MEDICINE

## 2024-01-09 RX ORDER — SULFAMETHOXAZOLE/TRIMETHOPRIM 800-160 MG
1 TABLET ORAL 2 TIMES DAILY
Qty: 20 TABLET | Refills: 0 | Status: SHIPPED | OUTPATIENT
Start: 2024-01-09 | End: 2024-01-26

## 2024-01-09 NOTE — PROGRESS NOTES
"  Assessment & Plan     Right epididymitis  His symptoms are consistent with epididymitis. Will treat with different antibiotic. If not improving ove rthe next 1-2 weeks, will ultrasound testicle.  - sulfamethoxazole-trimethoprim (BACTRIM DS) 800-160 MG tablet; Take 1 tablet by mouth 2 times daily  - US Testicular & Scrotum w Doppler Ltd; Future     BMI:   Estimated body mass index is 26.14 kg/m  as calculated from the following:    Height as of this encounter: 1.753 m (5' 9\").    Weight as of this encounter: 80.3 kg (177 lb).         Shane Garcia [facility] PRIOR JENIFFER Vo is a 63 year old, presenting for the following health issues:  No chief complaint on file.        1/9/2024    10:44 AM   Additional Questions   Roomed by Jie       History of Present Illness       Reason for visit:  Discomfort/abnormality of \"quirino parts\".    He eats 2-3 servings of fruits and vegetables daily.He consumes 1 sweetened beverage(s) daily.He exercises with enough effort to increase his heart rate 30 to 60 minutes per day.  He exercises with enough effort to increase his heart rate 4 days per week.   He is taking medications regularly.     Noticing pain on right testicle (around the top/back part of the testicle). He has history of epididymitis x 2. The first episode was in 2015 and resolved with antibiotics. This past fall he was treated again with the same antibiotic (Cipro) but symptoms never fully cleared. He is in a monogamous relationship. Denies any dysuria, hematuria, frequency, urgency. No urethral discharge. No fevers or chills. The pain does go up into the right pelvis/RLQ. NO bulging or masses noted.              Review of Systems   Constitutional, HEENT, cardiovascular, pulmonary, gi and gu systems are negative, except as otherwise noted.      Objective    /72   Pulse 69   Temp 96.9  F (36.1  C) (Tympanic)   Resp 18   Ht 1.753 m (5' 9\")   Wt 80.3 kg (177 lb)   SpO2 98%  "  BMI 26.14 kg/m    Body mass index is 26.14 kg/m .  Physical Exam   GENERAL: healthy, alert and no distress   (male): no hernias, penis normal without urethral discharge, and tenderness to palpation over right epididymis. No testicular tenderness or masses

## 2024-01-24 ENCOUNTER — HOSPITAL ENCOUNTER (OUTPATIENT)
Dept: ULTRASOUND IMAGING | Facility: CLINIC | Age: 64
Discharge: HOME | End: 2024-01-24
Attending: FAMILY MEDICINE | Admitting: FAMILY MEDICINE
Payer: COMMERCIAL

## 2024-01-24 DIAGNOSIS — N45.1 RIGHT EPIDIDYMITIS: ICD-10-CM

## 2024-01-24 PROCEDURE — 93976 VASCULAR STUDY: CPT

## 2024-01-24 PROCEDURE — 76870 US EXAM SCROTUM: CPT

## 2024-01-25 ENCOUNTER — MYC MEDICAL ADVICE (OUTPATIENT)
Dept: FAMILY MEDICINE | Facility: CLINIC | Age: 64
End: 2024-01-25
Payer: COMMERCIAL

## 2024-01-25 DIAGNOSIS — N45.1 RIGHT EPIDIDYMITIS: ICD-10-CM

## 2024-01-26 RX ORDER — SULFAMETHOXAZOLE/TRIMETHOPRIM 800-160 MG
1 TABLET ORAL 2 TIMES DAILY
Qty: 20 TABLET | Refills: 0 | Status: SHIPPED | OUTPATIENT
Start: 2024-01-26 | End: 2024-03-18 | Stop reason: SINTOL

## 2024-01-26 NOTE — TELEPHONE ENCOUNTER
Let's treat with another round of antibiotics. Rx sent to pharmacy.    Shane Garcia DO  1/26/2024 9:15 AM

## 2024-01-26 NOTE — TELEPHONE ENCOUNTER
Called patient and advised.of rx     Advised to call for new, persistent or worsening symptoms.

## 2024-02-01 RX ORDER — LEVOFLOXACIN 500 MG/1
500 TABLET, FILM COATED ORAL DAILY
Qty: 10 TABLET | Refills: 0 | Status: SHIPPED | OUTPATIENT
Start: 2024-02-01 | End: 2024-02-11

## 2024-02-01 NOTE — TELEPHONE ENCOUNTER
Unfortunately, Levofloxacin is the alternative antibiotic to the Bactrim when covering for bacteria causing epididymitis. Generally when taking this antibiotic I recommend taking a break from strenuous activity and monitor closely for any symptoms of joint pain or tendonitis type symptoms.     Shane Garcia DO  1/31/2024 8:47 PM

## 2024-03-18 ENCOUNTER — VIRTUAL VISIT (OUTPATIENT)
Dept: FAMILY MEDICINE | Facility: CLINIC | Age: 64
End: 2024-03-18
Payer: COMMERCIAL

## 2024-03-18 DIAGNOSIS — Z51.81 MEDICATION MONITORING ENCOUNTER: ICD-10-CM

## 2024-03-18 DIAGNOSIS — J01.90 ACUTE SINUSITIS WITH SYMPTOMS > 10 DAYS: Primary | ICD-10-CM

## 2024-03-18 DIAGNOSIS — Z91.09 ENVIRONMENTAL ALLERGIES: ICD-10-CM

## 2024-03-18 DIAGNOSIS — E03.8 SUBCLINICAL HYPOTHYROIDISM: ICD-10-CM

## 2024-03-18 DIAGNOSIS — K58.0 IRRITABLE BOWEL SYNDROME WITH DIARRHEA: ICD-10-CM

## 2024-03-18 PROCEDURE — 99213 OFFICE O/P EST LOW 20 MIN: CPT | Mod: 95 | Performed by: FAMILY MEDICINE

## 2024-03-18 RX ORDER — CEFDINIR 300 MG/1
300 CAPSULE ORAL 2 TIMES DAILY
Qty: 20 CAPSULE | Refills: 0 | Status: SHIPPED | OUTPATIENT
Start: 2024-03-18 | End: 2024-03-28

## 2024-03-18 NOTE — PROGRESS NOTES
"Tavo is a 63 year old who is being evaluated via a billable video visit.      How would you like to obtain your AVS? MyChart  If the video visit is dropped, the invitation should be resent by: Text to cell phone: [phone]  Will anyone else be joining your video visit? No      Assessment & Plan     Acute sinusitis with symptoms > 10 days    - cefdinir (OMNICEF) 300 MG capsule  Dispense: 20 capsule; Refill: 0    Environmental allergies      Irritable bowel syndrome with diarrhea      Subclinical hypothyroidism      Medication monitoring encounter      Prescription drug management    15 minutes spent by me on the date of the encounter doing chart review, history and exam, documentation and further activities per the note      BMI  Estimated body mass index is 26.14 kg/m  as calculated from the following:    Height as of 1/9/24: 1.753 m (5' 9\").    Weight as of 1/9/24: 80.3 kg (177 lb).     Regular exercise  Plan:    1) Medications: omnicef    2) Labs: NA    3) Immunizations: NA    4) Imaging/Diagnostics: NA    5) Consults: NA    6) sx cares reviewed - mucinex, tylenol, neddy pot    7) CPX in 5/2024    Subjective   Tavo is a 63 year old, presenting for the following health issues:  Cold Symptoms (Sinus problem?)        3/18/2024    11:04 AM   Additional Questions   Roomed by Bebeto OCAMPO CMA     History of Present Illness       Reason for visit:  Possible sinus infection  Symptom onset:  1-2 weeks ago  Symptoms include:  Sinus pain and ear pain  Symptom intensity:  Moderate  Symptom progression:  Worsening  Had these symptoms before:  Yes  Has tried/received treatment for these symptoms:  Yes  Previous treatment was successful:  Yes  Prior treatment description:  Anitbiotic  What makes it better:  Decongestant    He eats 2-3 servings of fruits and vegetables daily.He consumes 1 sweetened beverage(s) daily.He exercises with enough effort to increase his heart rate 20 to 29 minutes per day.  He exercises with enough " effort to increase his heart rate 4 days per week.   He is taking medications regularly.     Acute Illness    Started with symptoms 2-3 weeks ago, minor cold, increasing/worsening 1-2 weeks, ear pian/plugged, chills at times, no sweats, no fever, sinus pressure HA's, rhinitis = light yellow, PND same, ST improved, cough from PND, no n/v, occas diarrhea frm IBS    Acute illness concerns: Sinus Infection  Onset/Duration: cold, symptoms, Sinus problem, plugged ears  Symptoms:  Fever: No  Chills/Sweats: YES  Headache (location?): No  Sinus Pressure: YES- face, around eyes and into the ears  Conjunctivitis:  No  Ear Pain: YES: bilateral ear ache, plugged  Rhinorrhea: YES  Congestion: YES  Sore Throat: YES- but got better  Cough: YES- productive of yellow sputum, worsening - was starting to feel better and then came back really bad, PND.  Wheeze: No  Decreased Appetite: No  Nausea: No  Vomiting: No  Diarrhea: No  Dysuria/Freq.: No  Dysuria or Hematuria: No  Fatigue/Achiness: YES, due to not being able to sleep  Sick/Strep Exposure: No  Therapies tried and outcome: OTC Decongestant     Patient Active Problem List   Diagnosis    Seasonal allergies    Adenomatous polyp of colon    Subclinical hypothyroidism    Hyperlipidemia LDL goal <130    Advanced directives, counseling/discussion    PVC (premature ventricular contraction)    Environmental allergies    Glaucoma    Irritable bowel syndrome       Past Medical History:   Diagnosis Date    Adenomatous polyp of colon 09/09/2010    tubular - due 5 yrs    Environmental allergies     seasonal - spring    Epididymitis 2023    Glaucoma 05/2005    [facility] - bilateral    Hyperlipidemia LDL goal <130     borderline    Irritable bowel syndrome     IBS - D    PVC (premature ventricular contraction) 12/2015    Subclinical hypothyroidism 09/2009       Past Surgical History:   Procedure Laterality Date    COLONOSCOPY  9/09, 10/14, 11/19    History of  tubular adenomatous polyps -  due 5 yrs - internal hemorrhoids    COLONOSCOPY N/A 2023    Procedure: Colonoscopy random biopsies using cold bx forcep;  Surgeon: Carlos Alberto Clemens MD;  Location: [facility]    STRESS ECHO ([facility])  3/07, 10/09, ,     normal    SURGICAL HISTORY OF -  Bilateral 1987    HERNIA - JUNIOR    SURGICAL HISTORY OF -  Right     RT SHOULDER - Dr Julien    VASECTOMY         Current Outpatient Medications   Medication Sig Dispense Refill    brimonidine-timolol (COMBIGAN) 0.2-0.5 % ophthalmic solution       cefdinir (OMNICEF) 300 MG capsule Take 1 capsule (300 mg) by mouth 2 times daily for 10 days 20 capsule 0    dicyclomine (BENTYL) 20 MG tablet Take 1 tablet (20 mg) by mouth 4 times daily as needed (abdominal pain, diarrhea) 90 tablet 1    levothyroxine (SYNTHROID/LEVOTHROID) 50 MCG tablet Take 1 tablet (50 mcg) by mouth daily 90 tablet 3    pravastatin (PRAVACHOL) 20 MG tablet Take 1 tablet (20 mg) by mouth daily 90 tablet 3    XALATAN 0.005 % OP SOLN None Entered         Allergies   Allergen Reactions    Amoxicillin Rash     rash    Azithromycin Tinnitus     ?    Sulfamethoxazole-Trimethoprim Rash       Family History   Problem Relation Age of Onset    Other - See Comments Mother          in MVA at age 51    C.A.D. Father 61        age 61    Thyroid Disease Father     No Known Problems Maternal Grandmother     No Known Problems Maternal Grandfather     Cancer Paternal Grandmother         stomach ca    Cancer Paternal Grandfather         stomach ca    C.A.D. Brother 42        age 42    Depression Brother         suicide    Mental Illness Brother         anxiety    Coronary Artery Disease Sister     No Known Problems Sister     Pancreatic Cancer Cousin         1st cousin    Colon Cancer No family hx of        Social History     Socioeconomic History    Marital status:      Spouse name: Maribell    Number of children: 3    Years of education: 16    Highest education level: None   Occupational History      Employer: [employer]   Tobacco Use    Smoking status: Never    Smokeless tobacco: Never   Vaping Use    Vaping Use: Never used   Substance and Sexual Activity    Alcohol use: Yes     Types: 2 - 3 Standard drinks or equivalent per week     Comment: 1 drink per week    Drug use: No    Sexual activity: Yes     Partners: Female     Birth control/protection: Male Surgical   Other Topics Concern     Service No    Blood Transfusions No    Caffeine Concern Yes     Comment: 1-2 cups qd    Occupational Exposure No    Hobby Hazards No    Sleep Concern No    Stress Concern No    Weight Concern No    Exercise Yes     Comment: lifts and treadmill - Wii Fit - snap fitness - 2-3/week    Seat Belt Yes    Parent/sibling w/ CABG, MI or angioplasty before 65F 55M? Yes     Social Determinants of Health     Financial Resource Strain: Low Risk  (1/8/2024)    Financial Resource Strain     Within the past 12 months, have you or your family members you live with been unable to get utilities (heat, electricity) when it was really needed?: No   Food Insecurity: Low Risk  (1/8/2024)    Food Insecurity     Within the past 12 months, did you worry that your food would run out before you got money to buy more?: No     Within the past 12 months, did the food you bought just not last and you didn t have money to get more?: No   Transportation Needs: Low Risk  (1/8/2024)    Transportation Needs     Within the past 12 months, has lack of transportation kept you from medical appointments, getting your medicines, non-medical meetings or appointments, work, or from getting things that you need?: No   Interpersonal Safety: Low Risk  (1/9/2024)    Interpersonal Safety     Do you feel physically and emotionally safe where you currently live?: Yes     Within the past 12 months, have you been hit, slapped, kicked or otherwise physically hurt by someone?: No     Within the past 12 months, have you been humiliated or emotionally abused in other  ways by your partner or ex-partner?: No   Housing Stability: Low Risk  (1/8/2024)    Housing Stability     Do you have housing? : Yes     Are you worried about losing your housing?: No       Review of Systems  CONSTITUTIONAL: NEGATIVE for fever, chills, change in weight  INTEGUMENTARY/SKIN: NEGATIVE for worrisome rashes, moles or lesions  EYES: NEGATIVE for vision changes or irritation  RESP: NEGATIVE for significant cough or SOB  CV: NEGATIVE for chest pain, palpitations or peripheral edema  GI: NEGATIVE for nausea, abdominal pain, heartburn, or change in bowel habits  : NEGATIVE for frequency, dysuria, or hematuria  MUSCULOSKELETAL: NEGATIVE for significant arthralgias or myalgia  NEURO: NEGATIVE for weakness, dizziness or paresthesias  ENDOCRINE: NEGATIVE for temperature intolerance, skin/hair changes  HEME: NEGATIVE for bleeding problems  PSYCHIATRIC: NEGATIVE for changes in mood or affect      Objective       Vitals:  No vitals were obtained today due to virtual visit.    Physical Exam   GENERAL: alert and no distress  EYES: Eyes grossly normal to inspection.  No discharge or erythema, or obvious scleral/conjunctival abnormalities.  RESP: No audible wheeze, cough, or visible cyanosis.    SKIN: Visible skin clear. No significant rash, abnormal pigmentation or lesions.  NEURO: Cranial nerves grossly intact.  Mentation and speech appropriate for age.  PSYCH: Appropriate affect, tone, and pace of words      Video-Visit Details    Type of service:  Video Visit   Originating Location (pt. Location): Home      Distant Location (provider location):  On-site    Platform used for Video Visit: Catrachito    Signed Electronically by:              Reza Lynn MD, FAAFP     [facility]  [address] [place] [id number]  [email or web address]   Office: [phone]  Fax: [phone]

## 2024-04-01 ENCOUNTER — MYC MEDICAL ADVICE (OUTPATIENT)
Dept: GASTROENTEROLOGY | Facility: CLINIC | Age: 64
End: 2024-04-01
Payer: COMMERCIAL

## 2024-04-01 NOTE — TELEPHONE ENCOUNTER
Agree with office visit, will need to discuss additional medication changes if desired.    Ryan Pradhan PA-C

## 2024-04-03 ENCOUNTER — OFFICE VISIT (OUTPATIENT)
Dept: GASTROENTEROLOGY | Facility: CLINIC | Age: 64
End: 2024-04-03
Payer: COMMERCIAL

## 2024-04-03 VITALS
WEIGHT: 175.4 LBS | BODY MASS INDEX: 25.98 KG/M2 | SYSTOLIC BLOOD PRESSURE: 104 MMHG | DIASTOLIC BLOOD PRESSURE: 68 MMHG | HEIGHT: 69 IN | HEART RATE: 63 BPM | OXYGEN SATURATION: 100 %

## 2024-04-03 DIAGNOSIS — R19.7 DIARRHEA, UNSPECIFIED TYPE: Primary | ICD-10-CM

## 2024-04-03 DIAGNOSIS — K58.0 IRRITABLE BOWEL SYNDROME WITH DIARRHEA: ICD-10-CM

## 2024-04-03 DIAGNOSIS — R10.9 ABDOMINAL CRAMPING: ICD-10-CM

## 2024-04-03 PROCEDURE — 99213 OFFICE O/P EST LOW 20 MIN: CPT | Performed by: PHYSICIAN ASSISTANT

## 2024-04-03 RX ORDER — DICYCLOMINE HCL 20 MG
20 TABLET ORAL 4 TIMES DAILY PRN
Qty: 90 TABLET | Refills: 1 | Status: SHIPPED | OUTPATIENT
Start: 2024-04-03

## 2024-04-03 ASSESSMENT — PAIN SCALES - GENERAL: PAINLEVEL: NO PAIN (0)

## 2024-04-03 NOTE — LETTER
4/3/2024         RE: Tavo Johnson  [address] [facility] [place] [address]        Dear Colleague,    Thank you for referring your patient, Tavo Johnson, to the [facility]. Please see a copy of my visit note below.    FOLLOW UP GI CLINIC VISIT    CC/REFERRING MD:  No ref. provider found  REASON FOR CONSULTATION:   No ref. provider found for   Chief Complaint   Patient presents with     Follow Up     Follow up for ongoing chronic diarrhea., discuss options for treatment.   Last seen on 08/08/2023.       ASSESSMENT/PLAN: Patient here for follow-up regarding symptoms of recurring abdominal bloating, cramping, and loose stool.  Given her previous workup, my working diagnosis for him is IBS-D.  There has been some improvement with use of Bentyl and Metamucil, though he is still experiencing symptoms fairly often.  I recommended proceeding with CT scan to evaluate for small bowel pathology.  He will continue with his current medication regimen for now.  We did review other treatments for IBS, including a trial of tricyclic antidepressants.  He has tolerated Bentyl pretty well, so I imagine he would do all right with nortriptyline.  Will consider this if his CT is normal.    1. Irritable bowel syndrome with diarrhea  - dicyclomine (BENTYL) 20 MG tablet; Take 1 tablet (20 mg) by mouth 4 times daily as needed (abdominal pain, diarrhea)  Dispense: 90 tablet; Refill: 1    2. Diarrhea, unspecified type  - CT Abdomen Pelvis w Contrast; Future    3. Abdominal cramping  - CT Abdomen Pelvis w Contrast; Future      Thank you for this consultation.  It was a pleasure to participate in the care of this patient; please contact us with any further questions.      15 minutes spent on the date of the encounter doing chart review, patient visit, and documentation    This note was created with voice recognition software, and while reviewed for accuracy, typos may remain.     Ryan Pradhan  MONALISA  Division of Gastroenterology, Hepatology and Nutrition  [facility]  Tavo Johnson is a 63 year old male with a past medical history significant for hypothyroidism and hyperlipidemia that is seen for follow up in the GI clinic today for follow-up.  To review, I initially met with patient last year for symptoms of recurring abdominal cramping and loose stool.  This was generally occurring postprandially after eating certain foods.  He was having upwards of 4-5 loose stools per day at times.  OTC antidiarrheals had not been very helpful.  His initial workup included laboratory testing to include inflammatory markers and infectious diarrheal testing, all of which was essentially normal.  I sent him for a colonoscopy to rule out microscopic colitis, this did not show any signs of IBD and random biopsies from the colon were normal.  When I spoke with him in August 2023, he had been using some Metamucil, which had formed of his stools to some degree, but still having frequency along with gas and cramping.  He had also trialed some rifaximin without any improvement.    From there, I had him try Bentyl when symptoms occurred.  This did help improve his symptoms of gas and bloating, and with the Metamucil, he was having better formed stool, but over time, he has simply continued to have symptoms that have now worsened to some degree.  He ate some spicy food the other week and this really caused a lot of symptoms.  He is otherwise not have any new symptoms such as nausea, vomiting, epigastric pain, or bloody stools.  We had discussed pursuing abdominal imaging at some point and he now expresses interest in doing so.    ROS:    10 point ROS neg other than the symptoms noted above in the HPI.    PREVIOUS ENDOSCOPY:  Reviewed, see HPI    PERTINENT RELEVANT IMAGING OR LABS:  Reviewed    ALLERGIES:     Allergies   Allergen Reactions     Amoxicillin Rash     rash      Azithromycin Tinnitus     ?     Sulfamethoxazole-Trimethoprim Rash       PERTINENT MEDICATIONS:    Current Outpatient Medications:      brimonidine-timolol (COMBIGAN) 0.2-0.5 % ophthalmic solution, , Disp: , Rfl:      dicyclomine (BENTYL) 20 MG tablet, Take 1 tablet (20 mg) by mouth 4 times daily as needed (abdominal pain, diarrhea), Disp: 90 tablet, Rfl: 1     levothyroxine (SYNTHROID/LEVOTHROID) 50 MCG tablet, Take 1 tablet (50 mcg) by mouth daily, Disp: 90 tablet, Rfl: 3     pravastatin (PRAVACHOL) 20 MG tablet, Take 1 tablet (20 mg) by mouth daily, Disp: 90 tablet, Rfl: 3     XALATAN 0.005 % OP SOLN, None Entered, Disp: , Rfl:     Current Facility-Administered Medications:      sodium chloride (PF) 0.9% PF flush 3 mL, 3 mL, Intravenous, q1 min prn, Amol Rodríguez PA-C, 3 mL at 05/23/23 1132    PROBLEM LIST  Patient Active Problem List    Diagnosis Date Noted     Hyperlipidemia LDL goal <130 10/31/2010     Priority: High     Irritable bowel syndrome 08/08/2023     Priority: Medium     IBS - D       Environmental allergies      Priority: Medium     seasonal - spring       PVC (premature ventricular contraction)      Priority: Medium     Subclinical hypothyroidism      Priority: Medium     Adenomatous polyp of colon      Priority: Medium     Seasonal allergies      Priority: Medium     seasonal - spring       Glaucoma 05/01/2005     Priority: Medium     [facility] - bilateral       Advanced directives, counseling/discussion 12/05/2011     Priority: Low     Advance Directive Problem List Overview:   Name Relationship Phone    Primary Health Care Agent            Alternative Health Care Agent          Discussed advance care planning with patient; information given to patient to review. 12/5/2011     Left message for patient to call back to arrange facilitation if desired for completion of Advanced Care Directive. Peace Mullen LPN/Advanced Healthcare Planning Facilitator  12/13/11               PERTINENT  PAST MEDICAL HISTORY:  Past Medical History:   Diagnosis Date     Adenomatous polyp of colon 09/09/2010    tubular - due 5 yrs     Environmental allergies     seasonal - spring     Epididymitis 2023     Glaucoma 05/2005    [facility] - bilateral     Hyperlipidemia LDL goal <130     borderline     Irritable bowel syndrome     IBS - D     PVC (premature ventricular contraction) 12/2015     Subclinical hypothyroidism 09/2009       PREVIOUS SURGERIES:  Past Surgical History:   Procedure Laterality Date     COLONOSCOPY  9/09, 10/14, 11/19    History of  tubular adenomatous polyps - due 5 yrs - internal hemorrhoids     COLONOSCOPY N/A 7/25/2023    Procedure: Colonoscopy random biopsies using cold bx forcep;  Surgeon: Carlos Alberto Clemens MD;  Location: [facility]     STRESS ECHO ([facility])  3/07, 10/09, 12/11, 1/14    normal     SURGICAL HISTORY OF -  Bilateral 06/1987    HERNIA - JUNIOR     SURGICAL HISTORY OF -  Right 1996    RT SHOULDER - Dr Julien     VASECTOMY  1994       SOCIAL HISTORY:  Social History     Socioeconomic History     Marital status:      Spouse name: Maribell     Number of children: 3     Years of education: 16     Highest education level: Not on file   Occupational History     Employer: [employer]   Tobacco Use     Smoking status: Never     Smokeless tobacco: Never   Vaping Use     Vaping Use: Never used   Substance and Sexual Activity     Alcohol use: Yes     Types: 2 - 3 Standard drinks or equivalent per week     Comment: 1 drink per week     Drug use: No     Sexual activity: Yes     Partners: Female     Birth control/protection: Male Surgical   Other Topics Concern      Service No     Blood Transfusions No     Caffeine Concern Yes     Comment: 1-2 cups qd     Occupational Exposure No     Hobby Hazards No     Sleep Concern No     Stress Concern No     Weight Concern No     Special Diet Not Asked     Back Care Not Asked     Exercise Yes     Comment: lifts and treadmill - Wii Fit - snap  fitness - 2-3/week     Bike Helmet Not Asked     Seat Belt Yes     Self-Exams Not Asked     Parent/sibling w/ CABG, MI or angioplasty before 65F 55M? Yes   Social History Narrative     Not on file     Social Determinants of Health     Financial Resource Strain: Low Risk  (2024)    Financial Resource Strain      Within the past 12 months, have you or your family members you live with been unable to get utilities (heat, electricity) when it was really needed?: No   Food Insecurity: Low Risk  (2024)    Food Insecurity      Within the past 12 months, did you worry that your food would run out before you got money to buy more?: No      Within the past 12 months, did the food you bought just not last and you didn t have money to get more?: No   Transportation Needs: Low Risk  (2024)    Transportation Needs      Within the past 12 months, has lack of transportation kept you from medical appointments, getting your medicines, non-medical meetings or appointments, work, or from getting things that you need?: No   Physical Activity: Not on file   Stress: Not on file   Social Connections: Not on file   Interpersonal Safety: Low Risk  (2024)    Interpersonal Safety      Do you feel physically and emotionally safe where you currently live?: Yes      Within the past 12 months, have you been hit, slapped, kicked or otherwise physically hurt by someone?: No      Within the past 12 months, have you been humiliated or emotionally abused in other ways by your partner or ex-partner?: No   Housing Stability: Low Risk  (2024)    Housing Stability      Do you have housing? : Yes      Are you worried about losing your housing?: No       FAMILY HISTORY:  Family History   Problem Relation Age of Onset     Other - See Comments Mother          in MVA at age 51     C.A.D. Father 61        age 61     Thyroid Disease Father      No Known Problems Maternal Grandmother      No Known Problems Maternal Grandfather      Cancer  "Paternal Grandmother         stomach ca     Cancer Paternal Grandfather         stomach ca     C.A.D. Brother 42        age 42     Depression Brother         suicide     Mental Illness Brother         anxiety     Coronary Artery Disease Sister      No Known Problems Sister      Pancreatic Cancer Cousin         1st cousin     Colon Cancer No family hx of        Past/family/social history reviewed and no changes    PHYSICAL EXAMINATION:  Constitutional: aaox3, cooperative, pleasant, not dyspneic/diaphoretic, no acute distress  Vitals reviewed: /68 (BP Location: Left arm, Patient Position: Sitting, Cuff Size: Adult Regular)   Pulse 63   Ht 1.753 m (5' 9\")   Wt 79.6 kg (175 lb 6.4 oz)   SpO2 100%   BMI 25.90 kg/m    Wt:   Wt Readings from Last 2 Encounters:   04/03/24 79.6 kg (175 lb 6.4 oz)   01/09/24 80.3 kg (177 lb)      Eyes: Sclera anicteric/injected  CV: No edema  Respiratory: Unlabored breathing  Skin: warm, perfused, no jaundice  Psych: Normal affect  MSK: Normal gait                      Again, thank you for allowing me to participate in the care of your patient.        Sincerely,        Ryan Pradhan PA-C  "

## 2024-04-03 NOTE — NURSING NOTE
"Chief Complaint   Patient presents with    Follow Up     Follow up for ongoing chronic diarrhea., discuss options for treatment.   Last seen on 08/08/2023.     He requests these members of his care team be copied on today's visit information:  PCP: Reza Lynn    Vitals:    04/03/24 0957   BP: 104/68   BP Location: Left arm   Patient Position: Sitting   Cuff Size: Adult Regular   Pulse: 63   SpO2: 100%   Weight: 79.6 kg (175 lb 6.4 oz)   Height: 1.753 m (5' 9\")     Body mass index is 25.9 kg/m .    Medications were reconciled.    Does patient need any medication refills at today's visit? Not sure        Oliva Caldwell CMA    "

## 2024-04-03 NOTE — PROGRESS NOTES
FOLLOW UP GI CLINIC VISIT    CC/REFERRING MD:  No ref. provider found  REASON FOR CONSULTATION:   No ref. provider found for   Chief Complaint   Patient presents with    Follow Up     Follow up for ongoing chronic diarrhea., discuss options for treatment.   Last seen on 08/08/2023.       ASSESSMENT/PLAN: Patient here for follow-up regarding symptoms of recurring abdominal bloating, cramping, and loose stool.  Given her previous workup, my working diagnosis for him is IBS-D.  There has been some improvement with use of Bentyl and Metamucil, though he is still experiencing symptoms fairly often.  I recommended proceeding with CT scan to evaluate for small bowel pathology.  He will continue with his current medication regimen for now.  We did review other treatments for IBS, including a trial of tricyclic antidepressants.  He has tolerated Bentyl pretty well, so I imagine he would do all right with nortriptyline.  Will consider this if his CT is normal.    1. Irritable bowel syndrome with diarrhea  - dicyclomine (BENTYL) 20 MG tablet; Take 1 tablet (20 mg) by mouth 4 times daily as needed (abdominal pain, diarrhea)  Dispense: 90 tablet; Refill: 1    2. Diarrhea, unspecified type  - CT Abdomen Pelvis w Contrast; Future    3. Abdominal cramping  - CT Abdomen Pelvis w Contrast; Future      Thank you for this consultation.  It was a pleasure to participate in the care of this patient; please contact us with any further questions.      15 minutes spent on the date of the encounter doing chart review, patient visit, and documentation    This note was created with voice recognition software, and while reviewed for accuracy, typos may remain.     Ryan Pradhan PA-C  Division of Gastroenterology, Hepatology and Nutrition  [facility]  Tavo Johnson is a 63 year old male with a past medical history significant for hypothyroidism and hyperlipidemia that is seen for follow up in  the GI clinic today for follow-up.  To review, I initially met with patient last year for symptoms of recurring abdominal cramping and loose stool.  This was generally occurring postprandially after eating certain foods.  He was having upwards of 4-5 loose stools per day at times.  OTC antidiarrheals had not been very helpful.  His initial workup included laboratory testing to include inflammatory markers and infectious diarrheal testing, all of which was essentially normal.  I sent him for a colonoscopy to rule out microscopic colitis, this did not show any signs of IBD and random biopsies from the colon were normal.  When I spoke with him in August 2023, he had been using some Metamucil, which had formed of his stools to some degree, but still having frequency along with gas and cramping.  He had also trialed some rifaximin without any improvement.    From there, I had him try Bentyl when symptoms occurred.  This did help improve his symptoms of gas and bloating, and with the Metamucil, he was having better formed stool, but over time, he has simply continued to have symptoms that have now worsened to some degree.  He ate some spicy food the other week and this really caused a lot of symptoms.  He is otherwise not have any new symptoms such as nausea, vomiting, epigastric pain, or bloody stools.  We had discussed pursuing abdominal imaging at some point and he now expresses interest in doing so.    ROS:    10 point ROS neg other than the symptoms noted above in the HPI.    PREVIOUS ENDOSCOPY:  Reviewed, see HPI    PERTINENT RELEVANT IMAGING OR LABS:  Reviewed    ALLERGIES:     Allergies   Allergen Reactions    Amoxicillin Rash     rash    Azithromycin Tinnitus     ?    Sulfamethoxazole-Trimethoprim Rash       PERTINENT MEDICATIONS:    Current Outpatient Medications:     brimonidine-timolol (COMBIGAN) 0.2-0.5 % ophthalmic solution, , Disp: , Rfl:     dicyclomine (BENTYL) 20 MG tablet, Take 1 tablet (20 mg) by mouth  4 times daily as needed (abdominal pain, diarrhea), Disp: 90 tablet, Rfl: 1    levothyroxine (SYNTHROID/LEVOTHROID) 50 MCG tablet, Take 1 tablet (50 mcg) by mouth daily, Disp: 90 tablet, Rfl: 3    pravastatin (PRAVACHOL) 20 MG tablet, Take 1 tablet (20 mg) by mouth daily, Disp: 90 tablet, Rfl: 3    XALATAN 0.005 % OP SOLN, None Entered, Disp: , Rfl:     Current Facility-Administered Medications:     sodium chloride (PF) 0.9% PF flush 3 mL, 3 mL, Intravenous, q1 min prn, Amol Rodríguez PA-C, 3 mL at 05/23/23 1132    PROBLEM LIST  Patient Active Problem List    Diagnosis Date Noted    Hyperlipidemia LDL goal <130 10/31/2010     Priority: High    Irritable bowel syndrome 08/08/2023     Priority: Medium     IBS - D      Environmental allergies      Priority: Medium     seasonal - spring      PVC (premature ventricular contraction)      Priority: Medium    Subclinical hypothyroidism      Priority: Medium    Adenomatous polyp of colon      Priority: Medium    Seasonal allergies      Priority: Medium     seasonal - spring      Glaucoma 05/01/2005     Priority: Medium     [facility] - bilateral      Advanced directives, counseling/discussion 12/05/2011     Priority: Low     Advance Directive Problem List Overview:   Name Relationship Phone    Primary Health Care Agent            Alternative Health Care Agent          Discussed advance care planning with patient; information given to patient to review. 12/5/2011     Left message for patient to call back to arrange facilitation if desired for completion of Advanced Care Directive. Peace Mullen LPN/Advanced Healthcare Planning Facilitator  12/13/11               PERTINENT PAST MEDICAL HISTORY:  Past Medical History:   Diagnosis Date    Adenomatous polyp of colon 09/09/2010    tubular - due 5 yrs    Environmental allergies     seasonal - spring    Epididymitis 2023    Glaucoma 05/2005    [facility] - bilateral    Hyperlipidemia LDL goal <130     borderline     Irritable bowel syndrome     IBS - D    PVC (premature ventricular contraction) 12/2015    Subclinical hypothyroidism 09/2009       PREVIOUS SURGERIES:  Past Surgical History:   Procedure Laterality Date    COLONOSCOPY  9/09, 10/14, 11/19    History of  tubular adenomatous polyps - due 5 yrs - internal hemorrhoids    COLONOSCOPY N/A 7/25/2023    Procedure: Colonoscopy random biopsies using cold bx forcep;  Surgeon: Carlos Alberto Clemens MD;  Location:  GI    STRESS ECHO ([facility])  3/07, 10/09, 12/11, 1/14    normal    SURGICAL HISTORY OF -  Bilateral 06/1987    HERNIA - JUNIOR    SURGICAL HISTORY OF -  Right 1996    RT SHOULDER - Dr Julien    VASECTOMY  1994       SOCIAL HISTORY:  Social History     Socioeconomic History    Marital status:      Spouse name: Maribell    Number of children: 3    Years of education: 16    Highest education level: Not on file   Occupational History     Employer: [employer]   Tobacco Use    Smoking status: Never    Smokeless tobacco: Never   Vaping Use    Vaping Use: Never used   Substance and Sexual Activity    Alcohol use: Yes     Types: 2 - 3 Standard drinks or equivalent per week     Comment: 1 drink per week    Drug use: No    Sexual activity: Yes     Partners: Female     Birth control/protection: Male Surgical   Other Topics Concern     Service No    Blood Transfusions No    Caffeine Concern Yes     Comment: 1-2 cups qd    Occupational Exposure No    Hobby Hazards No    Sleep Concern No    Stress Concern No    Weight Concern No    Special Diet Not Asked    Back Care Not Asked    Exercise Yes     Comment: lifts and treadmill - Wii Fit - [employer] - 2-3/week    Bike Helmet Not Asked    Seat Belt Yes    Self-Exams Not Asked    Parent/sibling w/ CABG, MI or angioplasty before 65F 55M? Yes   Social History Narrative    Not on file     Social Determinants of Health     Financial Resource Strain: Low Risk  (1/8/2024)    Financial Resource Strain     Within the past 12  months, have you or your family members you live with been unable to get utilities (heat, electricity) when it was really needed?: No   Food Insecurity: Low Risk  (2024)    Food Insecurity     Within the past 12 months, did you worry that your food would run out before you got money to buy more?: No     Within the past 12 months, did the food you bought just not last and you didn t have money to get more?: No   Transportation Needs: Low Risk  (2024)    Transportation Needs     Within the past 12 months, has lack of transportation kept you from medical appointments, getting your medicines, non-medical meetings or appointments, work, or from getting things that you need?: No   Physical Activity: Not on file   Stress: Not on file   Social Connections: Not on file   Interpersonal Safety: Low Risk  (2024)    Interpersonal Safety     Do you feel physically and emotionally safe where you currently live?: Yes     Within the past 12 months, have you been hit, slapped, kicked or otherwise physically hurt by someone?: No     Within the past 12 months, have you been humiliated or emotionally abused in other ways by your partner or ex-partner?: No   Housing Stability: Low Risk  (2024)    Housing Stability     Do you have housing? : Yes     Are you worried about losing your housing?: No       FAMILY HISTORY:  Family History   Problem Relation Age of Onset    Other - See Comments Mother          in MVA at age 51    C.A.D. Father 61        age 61    Thyroid Disease Father     No Known Problems Maternal Grandmother     No Known Problems Maternal Grandfather     Cancer Paternal Grandmother         stomach ca    Cancer Paternal Grandfather         stomach ca    C.A.D. Brother 42        age 42    Depression Brother         suicide    Mental Illness Brother         anxiety    Coronary Artery Disease Sister     No Known Problems Sister     Pancreatic Cancer Cousin         1st cousin    Colon Cancer No family hx of   "      Past/family/social history reviewed and no changes    PHYSICAL EXAMINATION:  Constitutional: aaox3, cooperative, pleasant, not dyspneic/diaphoretic, no acute distress  Vitals reviewed: /68 (BP Location: Left arm, Patient Position: Sitting, Cuff Size: Adult Regular)   Pulse 63   Ht 1.753 m (5' 9\")   Wt 79.6 kg (175 lb 6.4 oz)   SpO2 100%   BMI 25.90 kg/m    Wt:   Wt Readings from Last 2 Encounters:   04/03/24 79.6 kg (175 lb 6.4 oz)   01/09/24 80.3 kg (177 lb)      Eyes: Sclera anicteric/injected  CV: No edema  Respiratory: Unlabored breathing  Skin: warm, perfused, no jaundice  Psych: Normal affect  MSK: Normal gait                    "

## 2024-05-07 ENCOUNTER — HOSPITAL ENCOUNTER (OUTPATIENT)
Dept: CT IMAGING | Facility: CLINIC | Age: 64
Discharge: HOME | End: 2024-05-07
Attending: PHYSICIAN ASSISTANT | Admitting: PHYSICIAN ASSISTANT
Payer: COMMERCIAL

## 2024-05-07 DIAGNOSIS — R10.9 ABDOMINAL CRAMPING: ICD-10-CM

## 2024-05-07 DIAGNOSIS — R19.7 DIARRHEA, UNSPECIFIED TYPE: ICD-10-CM

## 2024-05-07 PROCEDURE — 250N000009 HC RX 250: Performed by: STUDENT IN AN ORGANIZED HEALTH CARE EDUCATION/TRAINING PROGRAM

## 2024-05-07 PROCEDURE — 74177 CT ABD & PELVIS W/CONTRAST: CPT

## 2024-05-07 PROCEDURE — 250N000011 HC RX IP 250 OP 636: Performed by: STUDENT IN AN ORGANIZED HEALTH CARE EDUCATION/TRAINING PROGRAM

## 2024-05-07 RX ORDER — IOPAMIDOL 755 MG/ML
500 INJECTION, SOLUTION INTRAVASCULAR ONCE
Status: COMPLETED | OUTPATIENT
Start: 2024-05-07 | End: 2024-05-07

## 2024-05-07 RX ADMIN — IOPAMIDOL 88 ML: 755 INJECTION, SOLUTION INTRAVENOUS at 13:19

## 2024-05-07 RX ADMIN — SODIUM CHLORIDE 52 ML: 9 INJECTION, SOLUTION INTRAVENOUS at 13:19

## 2024-05-08 NOTE — RESULT ENCOUNTER NOTE
Nicholas Vo,    The report from your recent CT scan is available for you to review.  Overall, everything looks normal.  I do not see any signs of inflammation to the small bowel or colon.  There was a moderate amount of stool present.  Our options would be to have you continue with the Metamucil and dicyclomine, alternatively, we had discussed pursuing a neuromodulating medication like nortriptyline.  Medications like nortriptyline have been shown to improve global irritable bowel syndrome symptoms (abdominal pain, bloating, gas, altered bowel habits).  There can be some side effects, such as dry mouth, dry eyes, and constipation.  I have had pretty good success with them, though.  If you are interested in trying this, please let me know.    Sincerely,  Ryan OCAMPO [facility] GI, Hepatology, and Nutrition

## 2024-05-09 ENCOUNTER — MYC MEDICAL ADVICE (OUTPATIENT)
Dept: GASTROENTEROLOGY | Facility: CLINIC | Age: 64
End: 2024-05-09
Payer: COMMERCIAL

## 2024-05-09 DIAGNOSIS — K58.0 IRRITABLE BOWEL SYNDROME WITH DIARRHEA: Primary | ICD-10-CM

## 2024-05-09 SDOH — HEALTH STABILITY: PHYSICAL HEALTH: ON AVERAGE, HOW MANY DAYS PER WEEK DO YOU ENGAGE IN MODERATE TO STRENUOUS EXERCISE (LIKE A BRISK WALK)?: 4 DAYS

## 2024-05-09 SDOH — HEALTH STABILITY: PHYSICAL HEALTH: ON AVERAGE, HOW MANY MINUTES DO YOU ENGAGE IN EXERCISE AT THIS LEVEL?: 60 MIN

## 2024-05-09 ASSESSMENT — SOCIAL DETERMINANTS OF HEALTH (SDOH): HOW OFTEN DO YOU GET TOGETHER WITH FRIENDS OR RELATIVES?: ONCE A WEEK

## 2024-05-10 RX ORDER — NORTRIPTYLINE HCL 10 MG
10 CAPSULE ORAL AT BEDTIME
Qty: 60 CAPSULE | Refills: 0 | Status: SHIPPED | OUTPATIENT
Start: 2024-05-10 | End: 2024-07-12

## 2024-05-16 ENCOUNTER — OFFICE VISIT (OUTPATIENT)
Dept: FAMILY MEDICINE | Facility: CLINIC | Age: 64
End: 2024-05-16
Attending: FAMILY MEDICINE
Payer: COMMERCIAL

## 2024-05-16 VITALS
HEIGHT: 69 IN | RESPIRATION RATE: 16 BRPM | DIASTOLIC BLOOD PRESSURE: 80 MMHG | HEART RATE: 75 BPM | OXYGEN SATURATION: 98 % | TEMPERATURE: 98.3 F | WEIGHT: 172 LBS | BODY MASS INDEX: 25.48 KG/M2 | SYSTOLIC BLOOD PRESSURE: 126 MMHG

## 2024-05-16 DIAGNOSIS — I49.3 PVC (PREMATURE VENTRICULAR CONTRACTION): ICD-10-CM

## 2024-05-16 DIAGNOSIS — H40.9 GLAUCOMA OF BOTH EYES, UNSPECIFIED GLAUCOMA TYPE: ICD-10-CM

## 2024-05-16 DIAGNOSIS — Z51.81 MEDICATION MONITORING ENCOUNTER: ICD-10-CM

## 2024-05-16 DIAGNOSIS — D12.6 ADENOMATOUS POLYP OF COLON, UNSPECIFIED PART OF COLON: ICD-10-CM

## 2024-05-16 DIAGNOSIS — E78.5 HYPERLIPIDEMIA LDL GOAL <130: ICD-10-CM

## 2024-05-16 DIAGNOSIS — K58.0 IRRITABLE BOWEL SYNDROME WITH DIARRHEA: ICD-10-CM

## 2024-05-16 DIAGNOSIS — Z12.5 SCREENING FOR PROSTATE CANCER: ICD-10-CM

## 2024-05-16 DIAGNOSIS — E03.8 SUBCLINICAL HYPOTHYROIDISM: ICD-10-CM

## 2024-05-16 DIAGNOSIS — Z12.11 SCREEN FOR COLON CANCER: ICD-10-CM

## 2024-05-16 DIAGNOSIS — Z00.00 MEDICARE ANNUAL WELLNESS VISIT, SUBSEQUENT: ICD-10-CM

## 2024-05-16 DIAGNOSIS — Z00.00 ROUTINE GENERAL MEDICAL EXAMINATION AT A HEALTH CARE FACILITY: Primary | ICD-10-CM

## 2024-05-16 DIAGNOSIS — Z91.09 ENVIRONMENTAL ALLERGIES: ICD-10-CM

## 2024-05-16 LAB
ALBUMIN UR-MCNC: NEGATIVE MG/DL
APPEARANCE UR: CLEAR
BILIRUB UR QL STRIP: NEGATIVE
COLOR UR AUTO: YELLOW
ERYTHROCYTE [DISTWIDTH] IN BLOOD BY AUTOMATED COUNT: 12.9 % (ref 10–15)
GLUCOSE UR STRIP-MCNC: NEGATIVE MG/DL
HCT VFR BLD AUTO: 43.3 % (ref 40–53)
HGB BLD-MCNC: 14.3 G/DL (ref 13.3–17.7)
HGB UR QL STRIP: NEGATIVE
KETONES UR STRIP-MCNC: NEGATIVE MG/DL
LEUKOCYTE ESTERASE UR QL STRIP: NEGATIVE
MCH RBC QN AUTO: 30.8 PG (ref 26.5–33)
MCHC RBC AUTO-ENTMCNC: 33 G/DL (ref 31.5–36.5)
MCV RBC AUTO: 93 FL (ref 78–100)
NITRATE UR QL: NEGATIVE
PH UR STRIP: 6 [PH] (ref 5–7)
PLATELET # BLD AUTO: 188 10E3/UL (ref 150–450)
RBC # BLD AUTO: 4.65 10E6/UL (ref 4.4–5.9)
SP GR UR STRIP: 1.01 (ref 1–1.03)
UROBILINOGEN UR STRIP-ACNC: 0.2 E.U./DL
WBC # BLD AUTO: 5.3 10E3/UL (ref 4–11)

## 2024-05-16 PROCEDURE — 82570 ASSAY OF URINE CREATININE: CPT | Performed by: FAMILY MEDICINE

## 2024-05-16 PROCEDURE — 85027 COMPLETE CBC AUTOMATED: CPT | Performed by: FAMILY MEDICINE

## 2024-05-16 PROCEDURE — G0103 PSA SCREENING: HCPCS | Performed by: FAMILY MEDICINE

## 2024-05-16 PROCEDURE — 84443 ASSAY THYROID STIM HORMONE: CPT | Performed by: FAMILY MEDICINE

## 2024-05-16 PROCEDURE — 99397 PER PM REEVAL EST PAT 65+ YR: CPT | Performed by: FAMILY MEDICINE

## 2024-05-16 PROCEDURE — 36415 COLL VENOUS BLD VENIPUNCTURE: CPT | Performed by: FAMILY MEDICINE

## 2024-05-16 PROCEDURE — 80053 COMPREHEN METABOLIC PANEL: CPT | Performed by: FAMILY MEDICINE

## 2024-05-16 PROCEDURE — 84439 ASSAY OF FREE THYROXINE: CPT | Performed by: FAMILY MEDICINE

## 2024-05-16 PROCEDURE — 80061 LIPID PANEL: CPT | Performed by: FAMILY MEDICINE

## 2024-05-16 PROCEDURE — 82550 ASSAY OF CK (CPK): CPT | Performed by: FAMILY MEDICINE

## 2024-05-16 PROCEDURE — 82043 UR ALBUMIN QUANTITATIVE: CPT | Performed by: FAMILY MEDICINE

## 2024-05-16 PROCEDURE — 81003 URINALYSIS AUTO W/O SCOPE: CPT | Performed by: FAMILY MEDICINE

## 2024-05-16 RX ORDER — LEVOTHYROXINE SODIUM 50 UG/1
50 TABLET ORAL DAILY
Qty: 90 TABLET | Refills: 3 | Status: SHIPPED | OUTPATIENT
Start: 2024-05-16

## 2024-05-16 RX ORDER — PRAVASTATIN SODIUM 20 MG
20 TABLET ORAL DAILY
Qty: 90 TABLET | Refills: 3 | Status: SHIPPED | OUTPATIENT
Start: 2024-05-16

## 2024-05-16 NOTE — LETTER
Shreya 3, 2024      Tavo Johnson  [address] [place] [address]        Dear ,    We are writing to inform you of your test results.    Labs are overall quite good     We advise:     Continue current cares.   Balanced low cholesterol diet.   Regular exercise.     For additional lab test information, labtestsonline.org is an excellent reference.     Resulted Orders   Comprehensive metabolic panel   Result Value Ref Range    Sodium 140 135 - 145 mmol/L      Comment:      Reference intervals for this test were updated on 09/26/2023 to more accurately reflect our healthy population. There may be differences in the flagging of prior results with similar values performed with this method. Interpretation of those prior results can be made in the context of the updated reference intervals.     Potassium 4.4 3.4 - 5.3 mmol/L    Carbon Dioxide (CO2) 23 22 - 29 mmol/L    Anion Gap 12 7 - 15 mmol/L    Urea Nitrogen 14.0 8.0 - 23.0 mg/dL    Creatinine 1.00 0.67 - 1.17 mg/dL    GFR Estimate 84 >60 mL/min/1.73m2    Calcium 9.4 8.8 - 10.2 mg/dL    Chloride 105 98 - 107 mmol/L    Glucose 97 70 - 99 mg/dL    Alkaline Phosphatase 74 40 - 150 U/L      Comment:      Reference intervals for this test were updated on 11/14/2023 to more accurately reflect our healthy population. There may be differences in the flagging of prior results with similar values performed with this method. Interpretation of those prior results can be made in the context of the updated reference intervals.    AST 27 0 - 45 U/L      Comment:      Reference intervals for this test were updated on 6/12/2023 to more accurately reflect our healthy population. There may be differences in the flagging of prior results with similar values performed with this method. Interpretation of those prior results can be made in the context of the updated reference intervals.    ALT 29 0 - 70 U/L      Comment:      Reference intervals for this test were  updated on 6/12/2023 to more accurately reflect our healthy population. There may be differences in the flagging of prior results with similar values performed with this method. Interpretation of those prior results can be made in the context of the updated reference intervals.      Protein Total 7.3 6.4 - 8.3 g/dL    Albumin 4.4 3.5 - 5.2 g/dL    Bilirubin Total 0.9 <=1.2 mg/dL    Patient Fasting > 8hrs? Yes    Lipid panel reflex to direct LDL Fasting   Result Value Ref Range    Cholesterol 175 <200 mg/dL    Triglycerides 78 <150 mg/dL    Direct Measure HDL 44 >=40 mg/dL    LDL Cholesterol Calculated 115 (H) <=100 mg/dL    Non HDL Cholesterol 131 (H) <130 mg/dL    Patient Fasting > 8hrs? Yes     Narrative    Cholesterol  Desirable:  <200 mg/dL    Triglycerides  Normal:  Less than 150 mg/dL  Borderline High:  150-199 mg/dL  High:  200-499 mg/dL  Very High:  Greater than or equal to 500 mg/dL    Direct Measure HDL  Female:  Greater than or equal to 50 mg/dL   Male:  Greater than or equal to 40 mg/dL    LDL Cholesterol  Desirable:  <100mg/dL  Above Desirable:  100-129 mg/dL   Borderline High:  130-159 mg/dL   High:  160-189 mg/dL   Very High:  >= 190 mg/dL    Non HDL Cholesterol  Desirable:  130 mg/dL  Above Desirable:  130-159 mg/dL  Borderline High:  160-189 mg/dL  High:  190-219 mg/dL  Very High:  Greater than or equal to 220 mg/dL   CBC with platelets   Result Value Ref Range    WBC Count 5.3 4.0 - 11.0 10e3/uL    RBC Count 4.65 4.40 - 5.90 10e6/uL    Hemoglobin 14.3 13.3 - 17.7 g/dL    Hematocrit 43.3 40.0 - 53.0 %    MCV 93 78 - 100 fL    MCH 30.8 26.5 - 33.0 pg    MCHC 33.0 31.5 - 36.5 g/dL    RDW 12.9 10.0 - 15.0 %    Platelet Count 188 150 - 450 10e3/uL   CK total   Result Value Ref Range     39 - 308 U/L   UA Macroscopic with reflex to Microscopic and Culture   Result Value Ref Range    Color Urine Yellow Colorless, Straw, Light Yellow, Yellow    Appearance Urine Clear Clear    Glucose Urine Negative  Negative mg/dL    Bilirubin Urine Negative Negative    Ketones Urine Negative Negative mg/dL    Specific Gravity Urine 1.010 1.003 - 1.035    Blood Urine Negative Negative    pH Urine 6.0 5.0 - 7.0    Protein Albumin Urine Negative Negative mg/dL    Urobilinogen Urine 0.2 0.2, 1.0 E.U./dL    Nitrite Urine Negative Negative    Leukocyte Esterase Urine Negative Negative    Narrative    Microscopic not indicated   Albumin Random Urine Quantitative with Creat Ratio   Result Value Ref Range    Creatinine Urine mg/dL 30.6 mg/dL      Comment:      The reference ranges have not been established in urine creatinine. The results should be integrated into the clinical context for interpretation.    Albumin Urine mg/L <12.0 mg/L      Comment:      The reference ranges have not been established in urine albumin. The results should be integrated into the clinical context for interpretation.    Albumin Urine mg/g Cr        Comment:      Unable to calculate, urine albumin and/or urine creatinine is outside detectable limits.  Microalbuminuria is defined as an albumin:creatinine ratio of 17 to 299 for males and 25 to 299 for females. A ratio of albumin:creatinine of 300 or higher is indicative of overt proteinuria.  Due to biologic variability, positive results should be confirmed by a second, first-morning random or 24-hour timed urine specimen. If there is discrepancy, a third specimen is recommended. When 2 out of 3 results are in the microalbuminuria range, this is evidence for incipient nephropathy and warrants increased efforts at glucose control, blood pressure control, and institution of therapy with an angiotensin-converting-enzyme (ACE) inhibitor (if the patient can tolerate it).     Prostate Specific Antigen Screen   Result Value Ref Range    Prostate Specific Antigen Screen 0.80 0.00 - 4.50 ng/mL    Narrative    This result is obtained using the Roche Elecsys total PSA method on the haydee e801 immunoassay analyzer.  Results obtained with different assay methods or kits cannot be used interchangeably.   TSH   Result Value Ref Range    TSH 3.87 0.30 - 4.20 uIU/mL   T4, free   Result Value Ref Range    Free T4 1.30 0.90 - 1.70 ng/dL       If you have any questions or concerns, please call the clinic at the number listed above.       Sincerely,            Reza Lynn M.D.

## 2024-05-16 NOTE — PROGRESS NOTES
Preventive Care Visit  [facility] [place]  Reza Lynn MD, Family Medicine  May 16, 2024      Assessment & Plan     Routine general medical examination at a health care facility    - Comprehensive metabolic panel  - Lipid panel reflex to direct LDL Fasting  - CBC with platelets  - CK total  - UA Macroscopic with reflex to Microscopic and Culture  - Albumin Random Urine Quantitative with Creat Ratio  - Prostate Specific Antigen Screen  - Fecal colorectal cancer screen FIT  - TSH  - T4, free  - PRIMARY CARE FOLLOW-UP SCHEDULING  - Fecal colorectal cancer screen FIT  - Comprehensive metabolic panel  - Lipid panel reflex to direct LDL Fasting  - CBC with platelets  - CK total  - UA Macroscopic with reflex to Microscopic and Culture  - Albumin Random Urine Quantitative with Creat Ratio  - Prostate Specific Antigen Screen  - TSH  - T4, free    Medicare annual wellness visit, subsequent    - Comprehensive metabolic panel  - Lipid panel reflex to direct LDL Fasting  - CBC with platelets  - CK total  - UA Macroscopic with reflex to Microscopic and Culture  - Albumin Random Urine Quantitative with Creat Ratio  - Prostate Specific Antigen Screen  - Fecal colorectal cancer screen FIT  - TSH  - T4, free  - Fecal colorectal cancer screen FIT  - Comprehensive metabolic panel  - Lipid panel reflex to direct LDL Fasting  - CBC with platelets  - CK total  - UA Macroscopic with reflex to Microscopic and Culture  - Albumin Random Urine Quantitative with Creat Ratio  - Prostate Specific Antigen Screen  - TSH  - T4, free    Hyperlipidemia LDL goal <130    - Comprehensive metabolic panel  - Lipid panel reflex to direct LDL Fasting  - CK total  - PRIMARY CARE FOLLOW-UP SCHEDULING  - REVIEW OF HEALTH MAINTENANCE PROTOCOL ORDERS  - Comprehensive metabolic panel  - Lipid panel reflex to direct LDL Fasting  - CK total  - pravastatin (PRAVACHOL) 20 MG tablet  Dispense: 90 tablet; Refill: 3    Subclinical hypothyroidism    -  TSH  - T4, free  - PRIMARY CARE FOLLOW-UP SCHEDULING  - REVIEW OF HEALTH MAINTENANCE PROTOCOL ORDERS  - TSH  - T4, free  - levothyroxine (SYNTHROID/LEVOTHROID) 50 MCG tablet  Dispense: 90 tablet; Refill: 3    Environmental allergies    - PRIMARY CARE FOLLOW-UP SCHEDULING  - REVIEW OF HEALTH MAINTENANCE PROTOCOL ORDERS    Irritable bowel syndrome with diarrhea    - Comprehensive metabolic panel  - CBC with platelets  - REVIEW OF HEALTH MAINTENANCE PROTOCOL ORDERS  - Comprehensive metabolic panel  - CBC with platelets    Glaucoma of both eyes, unspecified glaucoma type    - PRIMARY CARE FOLLOW-UP SCHEDULING  - REVIEW OF HEALTH MAINTENANCE PROTOCOL ORDERS    PVC (premature ventricular contraction)    - PRIMARY CARE FOLLOW-UP SCHEDULING  - REVIEW OF HEALTH MAINTENANCE PROTOCOL ORDERS    Adenomatous polyp of colon, unspecified part of colon    - Fecal colorectal cancer screen FIT  - PRIMARY CARE FOLLOW-UP SCHEDULING  - REVIEW OF HEALTH MAINTENANCE PROTOCOL ORDERS  - Fecal colorectal cancer screen FIT    Screening for prostate cancer    - Prostate Specific Antigen Screen  - PRIMARY CARE FOLLOW-UP SCHEDULING  - REVIEW OF HEALTH MAINTENANCE PROTOCOL ORDERS  - Prostate Specific Antigen Screen    Screen for colon cancer    - Fecal colorectal cancer screen FIT  - PRIMARY CARE FOLLOW-UP SCHEDULING  - REVIEW OF HEALTH MAINTENANCE PROTOCOL ORDERS  - Fecal colorectal cancer screen FIT    Medication monitoring encounter    - Comprehensive metabolic panel  - Lipid panel reflex to direct LDL Fasting  - CBC with platelets  - CK total  - UA Macroscopic with reflex to Microscopic and Culture  - Albumin Random Urine Quantitative with Creat Ratio  - Prostate Specific Antigen Screen  - Fecal colorectal cancer screen FIT  - TSH  - T4, free  - PRIMARY CARE FOLLOW-UP SCHEDULING  - REVIEW OF HEALTH MAINTENANCE PROTOCOL ORDERS  - Fecal colorectal cancer screen FIT  - Comprehensive metabolic panel  - Lipid panel reflex to direct LDL Fasting  -  CBC with platelets  - CK total  - UA Macroscopic with reflex to Microscopic and Culture  - Albumin Random Urine Quantitative with Creat Ratio  - Prostate Specific Antigen Screen  - TSH  - T4, free    Patient has been advised of split billing requirements and indicates understanding: Yes      Counseling  Appropriate preventive services were discussed with this patient, including applicable screening as appropriate for fall prevention, nutrition, physical activity, Tobacco-use cessation, weight loss and cognition.  Checklist reviewing preventive services available has been given to the patient.  Reviewed patient's diet, addressing concerns and/or questions.   The patient was provided with written information regarding signs of hearing loss.     Regular exercise    Plan:    1) Medications: refills completed    2) Labs: pending    3) Immunizations: advised covid, flu in fall, RSV    4) Imaging/Diagnostics: NA    5) Consults: PAM AGARWAL    Steven Vo is a 63 year old, presenting for the following:    Physical (fasting)        5/16/2024    10:10 AM   Additional Questions   Roomed by Mai LOZA         Health Care Directive  Patient does not have a Health Care Directive or Living Will: Discussed advance care planning with patient; information given to patient to review.    Hyperlipidemia Follow-Up    Are you regularly taking any medication or supplement to lower your cholesterol?   Yes-    Are you having muscle aches or other side effects that you think could be caused by your cholesterol lowering medication?  No    Recent Labs   Lab Test 11/13/23  0829 05/15/23  0830   CHOL 174 189   HDL 43 38*   * 107*   TRIG 109 219*     IBS - D - Ryan AGARWAL - recently started on nortriptyline - some improvement - occas bentyl    Glaucoma - on combigam / xalatan    PVC's - no issues    Hypothyroidism Follow-up    Since last visit, patient describes the following symptoms: Weight stable, no hair loss, no skin  changes, no constipation, no loose stools    TSH   Date Value Ref Range Status   11/13/2023 3.22 0.30 - 4.20 uIU/mL Final   05/11/2022 3.17 0.40 - 4.00 mU/L Final   04/30/2021 2.96 0.40 - 4.00 mU/L Final         5/9/2024   General Health   How would you rate your overall physical health? Good   Feel stress (tense, anxious, or unable to sleep) Not at all         5/9/2024   Nutrition   Diet: Regular (no restrictions)         5/9/2024   Exercise   Days per week of moderate/strenous exercise 4 days   Average minutes spent exercising at this level 60 min         5/9/2024   Social Factors   Frequency of gathering with friends or relatives Once a week   Worry food won't last until get money to buy more No   Food not last or not have enough money for food? No   Do you have housing?  Yes   Are you worried about losing your housing? No   Lack of transportation? No   Unable to get utilities (heat,electricity)? No         5/9/2024   Fall Risk   Fallen 2 or more times in the past year? No   Trouble with walking or balance? No          5/9/2024   Activities of Daily Living- Home Safety   Needs help with the following daily activites None of the above   Safety concerns in the home None of the above         5/9/2024   Dental   Dentist two times every year? Yes         5/9/2024   Hearing Screening   Hearing concerns? (!) I NEED TO ASK PEOPLE TO SPEAK UP OR REPEAT THEMSELVES.    (!) IT'S HARD TO FOLLOW A CONVERSATION IN A NOISY RESTAURANT OR CROWDED ROOM.    (!) TROUBLE UNDERSTANDING SOFT OR WHISPERED SPEECH.         5/9/2024   Driving Risk Screening   Patient/family members have concerns about driving No         5/9/2024   General Alertness/Fatigue Screening   Have you been more tired than usual lately? No         5/9/2024   Urinary Incontinence Screening   Bothered by leaking urine in past 6 months No         5/9/2024   TB Screening   Were you born outside of the [place]? No         Today's PHQ-2 Score:       5/15/2024     1:55 PM    PHQ-2 (  [employer])   Q1: Little interest or pleasure in doing things 0   Q2: Feeling down, depressed or hopeless 0   PHQ-2 Score 0   Q1: Little interest or pleasure in doing things Not at all   Q2: Feeling down, depressed or hopeless Not at all   PHQ-2 Score 0           2024   Substance Use   Alcohol more than 3/day or more than 7/wk No   Do you have a current opioid prescription? No   How severe/bad is pain from 1 to 10? 2/10   Do you use any other substances recreationally? No     Social History     Tobacco Use    Smoking status: Never    Smokeless tobacco: Never   Vaping Use    Vaping status: Never Used   Substance Use Topics    Alcohol use: Not Currently     Alcohol/week: 0.0 - 1.0 standard drinks of alcohol     Comment: 1 drink per week    Drug use: No           2024   AAA Screening   Family history of Abdominal Aortic Aneurysm (AAA)? No   Last PSA:   PSA   Date Value Ref Range Status   2021 0.59 0 - 4 ug/L Final     Comment:     Assay Method:  Chemiluminescence using Siemens Vista analyzer     Prostate Specific Antigen Screen   Date Value Ref Range Status   05/15/2023 0.65 0.00 - 4.50 ng/mL Final   2022 0.53 0.00 - 4.00 ug/L Final     ASCVD Risk   The 10-year ASCVD risk score (Loretta TARIQ, et al., 2019) is: 12.3%    Values used to calculate the score:      Age: 63 years      Sex: Male      Is Non- : No      Diabetic: No      Tobacco smoker: No      Systolic Blood Pressure: 126 mmHg      Is BP treated: No      HDL Cholesterol: 43 mg/dL      Total Cholesterol: 174 mg/dL    Fracture Risk Assessment Tool  Link to Frax Calculator  Use the information below to complete the Frax calculator  : 1960  Sex: male  Weight (kg): 78 kg (actual weight)  Height (cm): 175.9 cm  Previous Fragility Fracture:  No  History of parent with fractured hip:  No  Current Smoking:  No  Patient has been on glucocorticoids for more than 3 months (5mg/day or more): No  Rheumatoid  Arthritis on Problem List:  No  Secondary Osteoporosis on Problem List:  No  Consumes 3 or more units of alcohol per day: No  Femoral Neck BMD (g/cm2)            Reviewed and updated as needed this visit by Provider                  Current providers sharing in care for this patient include:  Patient Care Team:  Reza Lynn MD as PCP - General  Reza Lynn MD as Assigned PCP  Ryan Pradhan PA-C as Physician Assistant (Gastroenterology)  Amol Rodríguez PA-C as Physician Assistant (Internal Medicine)  Ryan Pradhan PA-C as Assigned Gastroenterology Provider    The following health maintenance items are reviewed in Epic and correct as of today:  Health Maintenance   Topic Date Due    RSV VACCINE (Pregnancy & 60+) (1 - 1-dose 60+ series) Never done    COVID-19 Vaccine (7 - 2023-24 season) 03/09/2024    PSA  05/15/2024    INFLUENZA VACCINE (Season Ended) 09/01/2024    LIPID  11/13/2024    TSH W/FREE T4 REFLEX  11/13/2024    T4 FREE  11/13/2024    MEDICARE ANNUAL WELLNESS VISIT  05/16/2025    ANNUAL REVIEW OF HM ORDERS  05/16/2025    FALL RISK ASSESSMENT  05/16/2025    DTAP/TDAP/TD IMMUNIZATION (3 - Td or Tdap) 09/26/2025    GLUCOSE  11/13/2026    COLORECTAL CANCER SCREENING  07/25/2028    ADVANCE CARE PLANNING  05/16/2029    HEPATITIS C SCREENING  Completed    PHQ-2 (once per calendar year)  Completed    Pneumococcal Vaccine: 65+ Years  Completed    ZOSTER IMMUNIZATION  Completed    HIV SCREENING  Addressed    IPV IMMUNIZATION  Aged Out    HPV IMMUNIZATION  Aged Out    MENINGITIS IMMUNIZATION  Aged Out    RSV MONOCLONAL ANTIBODY  Aged Out     Patient Active Problem List   Diagnosis    Seasonal allergies    Adenomatous polyp of colon    Subclinical hypothyroidism    Hyperlipidemia LDL goal <130    Advanced directives, counseling/discussion    PVC (premature ventricular contraction)    Environmental allergies    Glaucoma    Irritable bowel syndrome       Past Medical History:   Diagnosis Date    Adenomatous polyp of  colon 2010    tubular - due 5 yrs    Environmental allergies     seasonal - spring    Epididymitis     Glaucoma 2005    [facility] - bilateral    Hyperlipidemia LDL goal <130     borderline    Irritable bowel syndrome     IBS - D    PVC (premature ventricular contraction) 2015    Subclinical hypothyroidism 2009       Past Surgical History:   Procedure Laterality Date    COLONOSCOPY  , 10/14,     History of  tubular adenomatous polyps - due 5 yrs - internal hemorrhoids    COLONOSCOPY N/A 2023    Hx of tubular adenoma - due 5 years    STRESS ECHO ([facility])  3/07, 10/09, ,     normal    SURGICAL HISTORY OF -  Bilateral 1987    HERNIA - JUNIOR    SURGICAL HISTORY OF -  Right     RT SHOULDER - Dr Julien    VASECTOMY         Current Outpatient Medications   Medication Sig Dispense Refill    brimonidine-timolol (COMBIGAN) 0.2-0.5 % ophthalmic solution       dicyclomine (BENTYL) 20 MG tablet Take 1 tablet (20 mg) by mouth 4 times daily as needed (abdominal pain, diarrhea) 90 tablet 1    levothyroxine (SYNTHROID/LEVOTHROID) 50 MCG tablet Take 1 tablet (50 mcg) by mouth daily 90 tablet 3    nortriptyline (PAMELOR) 10 MG capsule Take 1 capsule (10 mg) by mouth at bedtime 60 capsule 0    pravastatin (PRAVACHOL) 20 MG tablet Take 1 tablet (20 mg) by mouth daily 90 tablet 3    XALATAN 0.005 % OP SOLN None Entered         Allergies   Allergen Reactions    Amoxicillin Rash     rash    Azithromycin Tinnitus     ?    Sulfamethoxazole-Trimethoprim Rash       Family History   Problem Relation Age of Onset    Other - See Comments Mother          in MVA at age 51    C.A.D. Father 61        age 61    Thyroid Disease Father     Coronary Artery Disease Sister     No Known Problems Sister     C.A.D. Brother 42        age 42    Depression Brother         suicide    Mental Illness Brother     Mental Illness Brother         anxiety    Coronary Artery Disease Brother     Parkinsonism  Brother     No Known Problems Maternal Grandmother     No Known Problems Maternal Grandfather     Cancer Paternal Grandmother         stomach ca    Cancer Paternal Grandfather         stomach ca    Pancreatic Cancer Cousin         1st cousin    Colon Cancer No family hx of        Social History     Socioeconomic History    Marital status:      Spouse name: Maribell    Number of children: 3    Years of education: 16    Highest education level: None   Occupational History     Employer: [employer]   Tobacco Use    Smoking status: Never    Smokeless tobacco: Never   Vaping Use    Vaping status: Never Used   Substance and Sexual Activity    Alcohol use: Not Currently     Alcohol/week: 0.0 - 1.0 standard drinks of alcohol     Comment: 1 drink per week    Drug use: No    Sexual activity: Yes     Partners: Female     Birth control/protection: Male Surgical   Other Topics Concern     Service No    Blood Transfusions No    Caffeine Concern Yes     Comment: 1-2 cups qd    Occupational Exposure No    Hobby Hazards No    Sleep Concern No    Stress Concern No    Weight Concern No    Exercise Yes     Comment: lifts and treadmill - Wii Fit - snap fitness - 2-3/week    Seat Belt Yes    Parent/sibling w/ CABG, MI or angioplasty before 65F 55M? Yes     Social Determinants of Health     Financial Resource Strain: Low Risk  (5/9/2024)    Financial Resource Strain     Within the past 12 months, have you or your family members you live with been unable to get utilities (heat, electricity) when it was really needed?: No   Food Insecurity: Low Risk  (5/9/2024)    Food Insecurity     Within the past 12 months, did you worry that your food would run out before you got money to buy more?: No     Within the past 12 months, did the food you bought just not last and you didn t have money to get more?: No   Transportation Needs: Low Risk  (5/9/2024)    Transportation Needs     Within the past 12 months, has lack of  transportation kept you from medical appointments, getting your medicines, non-medical meetings or appointments, work, or from getting things that you need?: No   Physical Activity: Sufficiently Active (5/9/2024)    Exercise Vital Sign     Days of Exercise per Week: 4 days     Minutes of Exercise per Session: 60 min   Stress: No Stress Concern Present (5/9/2024)    [employer] - Occupational Stress Questionnaire     Feeling of Stress : Not at all   Social Connections: Unknown (5/9/2024)    Social Connection and Isolation Panel [NHANES]     Frequency of Social Gatherings with Friends and Family: Once a week   Interpersonal Safety: Low Risk  (5/16/2024)    Interpersonal Safety     Do you feel physically and emotionally safe where you currently live?: Yes     Within the past 12 months, have you been hit, slapped, kicked or otherwise physically hurt by someone?: No     Within the past 12 months, have you been humiliated or emotionally abused in other ways by your partner or ex-partner?: No   Housing Stability: Low Risk  (5/9/2024)    Housing Stability     Do you have housing? : Yes     Are you worried about losing your housing?: No     Colonoscopy:  due 7/2028  FIT / Cologuard: ordered  PSA: pending      Review of Systems  CONSTITUTIONAL: NEGATIVE for fever, chills, change in weight  INTEGUMENTARY/SKIN: NEGATIVE for worrisome rashes, moles or lesions  EYES: NEGATIVE for vision changes or irritation  ENT/MOUTH: NEGATIVE for ear, mouth and throat problems  RESP: NEGATIVE for significant cough or SOB  CV: NEGATIVE for chest pain, palpitations or peripheral edema  GI: NEGATIVE for nausea, abdominal pain, heartburn, or change in bowel habits  : NEGATIVE for frequency, dysuria, or hematuria  MUSCULOSKELETAL: NEGATIVE for significant arthralgias or myalgia  NEURO: NEGATIVE for weakness, dizziness or paresthesias  ENDOCRINE: NEGATIVE for temperature intolerance, skin/hair changes  HEME: NEGATIVE  "for bleeding problems  PSYCHIATRIC: NEGATIVE for changes in mood or affect     Objective    Exam  /80   Pulse 75   Temp 98.3  F (36.8  C) (Oral)   Resp 16   Ht 1.759 m (5' 9.25\")   Wt 78 kg (172 lb)   SpO2 98%   BMI 25.22 kg/m     Estimated body mass index is 25.22 kg/m  as calculated from the following:    Height as of this encounter: 1.759 m (5' 9.25\").    Weight as of this encounter: 78 kg (172 lb).    Physical Exam  GENERAL: alert and no distress  EYES: Eyes grossly normal to inspection, PERRL and conjunctivae and sclerae normal  HENT: ear canals and TM's normal, nose and mouth without ulcers or lesions  NECK: no adenopathy, no asymmetry, masses, or scars  RESP: lungs clear to auscultation - no rales, rhonchi or wheezes  CV: regular rate and rhythm, normal S1 S2, no S3 or S4, no murmur, click or rub, no peripheral edema  ABDOMEN: soft, nontender, no hepatosplenomegaly, no masses and bowel sounds normal   (male): normal male genitalia without lesions or urethral discharge, no hernia  RECTAL: normal sphincter tone, no rectal masses, prostate normal size, smooth, nontender without nodules or masses  MS: no gross musculoskeletal defects noted, no edema  SKIN: no suspicious lesions or rashes  NEURO: Normal strength and tone, mentation intact and speech normal  PSYCH: mentation appears normal, affect normal/bright        5/16/2024   Mini Cog   Clock Draw Score 2 Normal   3 Item Recall 3 objects recalled   Mini Cog Total Score 5            Signed Electronically by:              Reza Lynn MD, FAAFP     [facility]  [address] [place] [id number]  [facility] [email or web address]   Office: [phone]  Fax: [phone]         "

## 2024-05-17 LAB
ALBUMIN SERPL BCG-MCNC: 4.4 G/DL (ref 3.5–5.2)
ALP SERPL-CCNC: 74 U/L (ref 40–150)
ALT SERPL W P-5'-P-CCNC: 29 U/L (ref 0–70)
ANION GAP SERPL CALCULATED.3IONS-SCNC: 12 MMOL/L (ref 7–15)
AST SERPL W P-5'-P-CCNC: 27 U/L (ref 0–45)
BILIRUB SERPL-MCNC: 0.9 MG/DL
BUN SERPL-MCNC: 14 MG/DL (ref 8–23)
CALCIUM SERPL-MCNC: 9.4 MG/DL (ref 8.8–10.2)
CHLORIDE SERPL-SCNC: 105 MMOL/L (ref 98–107)
CHOLEST SERPL-MCNC: 175 MG/DL
CK SERPL-CCNC: 116 U/L (ref 39–308)
CREAT SERPL-MCNC: 1 MG/DL (ref 0.67–1.17)
CREAT UR-MCNC: 30.6 MG/DL
DEPRECATED HCO3 PLAS-SCNC: 23 MMOL/L (ref 22–29)
EGFRCR SERPLBLD CKD-EPI 2021: 84 ML/MIN/1.73M2
FASTING STATUS PATIENT QL REPORTED: YES
FASTING STATUS PATIENT QL REPORTED: YES
GLUCOSE SERPL-MCNC: 97 MG/DL (ref 70–99)
HDLC SERPL-MCNC: 44 MG/DL
LDLC SERPL CALC-MCNC: 115 MG/DL
MICROALBUMIN UR-MCNC: <12 MG/L
MICROALBUMIN/CREAT UR: NORMAL MG/G{CREAT}
NONHDLC SERPL-MCNC: 131 MG/DL
POTASSIUM SERPL-SCNC: 4.4 MMOL/L (ref 3.4–5.3)
PROT SERPL-MCNC: 7.3 G/DL (ref 6.4–8.3)
PSA SERPL DL<=0.01 NG/ML-MCNC: 0.8 NG/ML (ref 0–4.5)
SODIUM SERPL-SCNC: 140 MMOL/L (ref 135–145)
T4 FREE SERPL-MCNC: 1.3 NG/DL (ref 0.9–1.7)
TRIGL SERPL-MCNC: 78 MG/DL
TSH SERPL DL<=0.005 MIU/L-ACNC: 3.87 UIU/ML (ref 0.3–4.2)

## 2024-07-12 ENCOUNTER — MYC MEDICAL ADVICE (OUTPATIENT)
Dept: GASTROENTEROLOGY | Facility: CLINIC | Age: 64
End: 2024-07-12
Payer: COMMERCIAL

## 2024-07-12 DIAGNOSIS — K58.0 IRRITABLE BOWEL SYNDROME WITH DIARRHEA: ICD-10-CM

## 2024-07-12 RX ORDER — NORTRIPTYLINE HCL 10 MG
10 CAPSULE ORAL AT BEDTIME
Qty: 60 CAPSULE | Refills: 0 | Status: SHIPPED | OUTPATIENT
Start: 2024-07-12 | End: 2024-07-30

## 2024-07-12 NOTE — TELEPHONE ENCOUNTER
Routed [employer] message to provider and pended order to Ryan Pradhan PA-C    Only ordered 2 month supply, patient unsure is suppose to continue taking.

## 2024-07-30 ENCOUNTER — OFFICE VISIT (OUTPATIENT)
Dept: GASTROENTEROLOGY | Facility: CLINIC | Age: 64
End: 2024-07-30
Attending: PHYSICIAN ASSISTANT
Payer: COMMERCIAL

## 2024-07-30 VITALS
DIASTOLIC BLOOD PRESSURE: 81 MMHG | SYSTOLIC BLOOD PRESSURE: 118 MMHG | WEIGHT: 172.6 LBS | HEART RATE: 73 BPM | BODY MASS INDEX: 25.56 KG/M2 | HEIGHT: 69 IN | OXYGEN SATURATION: 99 %

## 2024-07-30 DIAGNOSIS — K58.0 IRRITABLE BOWEL SYNDROME WITH DIARRHEA: ICD-10-CM

## 2024-07-30 DIAGNOSIS — Z79.899 HIGH RISK MEDICATION USE: Primary | ICD-10-CM

## 2024-07-30 PROCEDURE — 99214 OFFICE O/P EST MOD 30 MIN: CPT | Performed by: PHYSICIAN ASSISTANT

## 2024-07-30 PROCEDURE — 93000 ELECTROCARDIOGRAM COMPLETE: CPT | Performed by: PHYSICIAN ASSISTANT

## 2024-07-30 RX ORDER — NORTRIPTYLINE HCL 10 MG
30 CAPSULE ORAL AT BEDTIME
Qty: 60 CAPSULE | Refills: 0 | Status: SHIPPED | OUTPATIENT
Start: 2024-07-30 | End: 2024-09-20

## 2024-07-30 ASSESSMENT — PAIN SCALES - GENERAL: PAINLEVEL: NO PAIN (0)

## 2024-07-30 NOTE — NURSING NOTE
"Chief Complaint   Patient presents with    Follow Up     4 month follow up for IBS with diarrhea.     He requests these members of his care team be copied on today's visit information:  PCP: Reza Lynn    Vitals:    07/30/24 1358   BP: 118/81   BP Location: Left arm   Patient Position: Sitting   Cuff Size: Adult Regular   Pulse: 73   SpO2: 99%   Weight: 78.3 kg (172 lb 9.6 oz)   Height: 1.753 m (5' 9\")     Body mass index is 25.49 kg/m .    Medications were reconciled.    Does patient need any medication refills at today's visit? No        Oliva Caldwell CMA    "

## 2024-07-30 NOTE — LETTER
7/30/2024      Tavo Johnson  [address] [place] [address]      Dear Colleague,    Thank you for referring your patient, Tavo Johnsno, to the [facility]. Please see a copy of my visit note below.    FOLLOW UP GI CLINIC VISIT    CC/REFERRING MD:  Ryan Pradhan  REASON FOR CONSULTATION:   Ryan Pradhan for   Chief Complaint   Patient presents with     Follow Up     4 month follow up for IBS with diarrhea.       ASSESSMENT/PLAN: Patient here for follow-up of likely IBS-D based on his workup thus far.  Limited improvement with Bentyl and Metamucil, rifaximin provided no improvement.  Has had some improvement with dietary change.  Most recently, has probably seen the most improvement with low-dose nortriptyline.  We will cautiously titrate up to 20 mg for couple of weeks, and if tolerating, he will go up to 30 mg.  Plan to update ECG today.  Again reviewed risks of potential side effects and he will let me know if any occur.  If he does not see much improvement with increased dosing up to 30 mg, we will plan to taper down and consider alternative therapies, which may include dedicated nutritional consult.  Would also consider Viberzi.  Will follow-up with patient in 1 month over the [facility].    1. Irritable bowel syndrome with diarrhea  - Adult GI Clinic Follow-Up Order (Blank)  - nortriptyline (PAMELOR) 10 MG capsule; Take 3 capsules (30 mg) by mouth at bedtime  Dispense: 60 capsule; Refill: 0    2. High risk medication use  - EKG 12-lead complete w/read - Clinics      Thank you for this consultation.  It was a pleasure to participate in the care of this patient; please contact us with any further questions.      20 minutes spent on the date of the encounter doing chart review, patient visit, and documentation    This note was created with voice recognition software, and while reviewed for accuracy, typos may remain.     Ryan Pradhan PA-C  Division of Gastroenterology,  "Hepatology and Nutrition  [facility]  Tavo Johnson is a 64 year old male with a past medical history significant for hypothyroidism and hyperlipidemia that is seen for follow up in the GI clinic today for follow-up.  To review, I initially met with patient a little over a year ago for symptoms of recurrent abdominal cramping with associated loose stool.  His workup included evaluation for infectious and inflammatory diarrhea, all of which was essentially normal.  We sent him for a colonoscopy to rule out microscopic colitis, which did not show any signs of IBD and random biopsies were normal.  We started him on Metamucil which helped form up his stools to some degree, but still was having frequent stools with gas and cramping.  A trial of rifaximin was pursued without any improvement.  From there, I had him trial Bentyl with symptom onset.  He had some mild improvement with this, but not significant.  He did some exploration of dietary change, eliminated milk products for the most part as well as spicy foods.  He otherwise was not experiencing any alarm symptoms.  After his last visit with me about 4 months ago, we referred him for a CT scan.  This did not show any abnormal findings.  After that, I elected to start him on nortriptyline 10 mg nightly.  He notes that this has improved some of his symptoms, helped his GI system \"calm down\", having less bloating, urgency.  Still having mostly unformed stools typically 2-3 times per day, and then will have flares of symptoms.  He will have more frequent bowel movements and more abdominal distress.  He seems to be tolerating nortriptyline well, not having any specific anticholinergic effects.    ROS:    10 point ROS neg other than the symptoms noted above in the HPI.    PREVIOUS ENDOSCOPY:  Reviewed, see HPI    PERTINENT RELEVANT IMAGING OR LABS:  Reviewed    ALLERGIES:     Allergies   Allergen Reactions     " Amoxicillin Rash     rash     Azithromycin Tinnitus     ?     Sulfamethoxazole-Trimethoprim Rash       PERTINENT MEDICATIONS:    Current Outpatient Medications:      brimonidine-timolol (COMBIGAN) 0.2-0.5 % ophthalmic solution, , Disp: , Rfl:      dicyclomine (BENTYL) 20 MG tablet, Take 1 tablet (20 mg) by mouth 4 times daily as needed (abdominal pain, diarrhea), Disp: 90 tablet, Rfl: 1     levothyroxine (SYNTHROID/LEVOTHROID) 50 MCG tablet, Take 1 tablet (50 mcg) by mouth daily, Disp: 90 tablet, Rfl: 3     nortriptyline (PAMELOR) 10 MG capsule, Take 3 capsules (30 mg) by mouth at bedtime, Disp: 60 capsule, Rfl: 0     pravastatin (PRAVACHOL) 20 MG tablet, Take 1 tablet (20 mg) by mouth daily, Disp: 90 tablet, Rfl: 3     XALATAN 0.005 % OP SOLN, None Entered, Disp: , Rfl:     Current Facility-Administered Medications:      sodium chloride (PF) 0.9% PF flush 3 mL, 3 mL, Intravenous, q1 min prn, Amol Rodríguez PA-C, 3 mL at 05/23/23 1132    PROBLEM LIST  Patient Active Problem List    Diagnosis Date Noted     Hyperlipidemia LDL goal <130 10/31/2010     Priority: High     Irritable bowel syndrome 08/08/2023     Priority: Medium     IBS - D       Environmental allergies      Priority: Medium     seasonal - spring       PVC (premature ventricular contraction)      Priority: Medium     Subclinical hypothyroidism      Priority: Medium     Adenomatous polyp of colon      Priority: Medium     Seasonal allergies      Priority: Medium     seasonal - spring       Glaucoma 05/01/2005     Priority: Medium     [facility]         PERTINENT PAST MEDICAL HISTORY:  Past Medical History:   Diagnosis Date     Adenomatous polyp of colon 09/09/2010    tubular - due 5 yrs     Environmental allergies     seasonal - spring     Epididymitis 2023     Glaucoma 05/2005    [facility] - [facility]     Hyperlipidemia LDL goal <130     borderline     Irritable bowel syndrome     IBS - D     PVC (premature ventricular  contraction) 12/2015     Subclinical hypothyroidism 09/2009       PREVIOUS SURGERIES:  Past Surgical History:   Procedure Laterality Date     COLONOSCOPY  9/09, 10/14, 11/19    History of  tubular adenomatous polyps - due 5 yrs - internal hemorrhoids     COLONOSCOPY N/A 07/25/2023    Hx of tubular adenoma - due 5 years     STRESS ECHO ([facility]RO)  3/07, 10/09, 12/11, 1/14    normal     SURGICAL HISTORY OF -  Bilateral 06/1987    HERNIA - JUNIOR     SURGICAL HISTORY OF -  Right 1996    RT SHOULDER - Dr Gost     VASECTOMY  1994       SOCIAL HISTORY:  Social History     Socioeconomic History     Marital status:      Spouse name: Maribell     Number of children: 3     Years of education: 16     Highest education level: Not on file   Occupational History     Employer: [employer]   Tobacco Use     Smoking status: Never     Smokeless tobacco: Never   Vaping Use     Vaping status: Never Used   Substance and Sexual Activity     Alcohol use: Not Currently     Alcohol/week: 0.0 - 1.0 standard drinks of alcohol     Comment: 1 drink per week     Drug use: No     Sexual activity: Yes     Partners: Female     Birth control/protection: Male Surgical   Other Topics Concern      Service No     Blood Transfusions No     Caffeine Concern Yes     Comment: 1-2 cups qd     Occupational Exposure No     Hobby Hazards No     Sleep Concern No     Stress Concern No     Weight Concern No     Special Diet Not Asked     Back Care Not Asked     Exercise Yes     Comment: lifts and treadmill - Wii Fit - [employer] - 2-3/week     Bike Helmet Not Asked     Seat Belt Yes     Self-Exams Not Asked     Parent/sibling w/ CABG, MI or angioplasty before 65F 55M? Yes   Social History Narrative     Not on file     Social Determinants of Health     Financial Resource Strain: Low Risk  (5/9/2024)    Financial Resource Strain      Within the past 12 months, have you or your family members you live with been unable to get utilities (heat,  electricity) when it was really needed?: No   Food Insecurity: Low Risk  (5/9/2024)    Food Insecurity      Within the past 12 months, did you worry that your food would run out before you got money to buy more?: No      Within the past 12 months, did the food you bought just not last and you didn t have money to get more?: No   Transportation Needs: Low Risk  (5/9/2024)    Transportation Needs      Within the past 12 months, has lack of transportation kept you from medical appointments, getting your medicines, non-medical meetings or appointments, work, or from getting things that you need?: No   Physical Activity: Sufficiently Active (5/9/2024)    Exercise Vital Sign      Days of Exercise per Week: 4 days      Minutes of Exercise per Session: 60 min   Stress: No Stress Concern Present (5/9/2024)    [place] [employer] - Occupational Stress Questionnaire      Feeling of Stress : Not at all   Social Connections: Unknown (5/9/2024)    Social Connection and Isolation Panel [NHANES]      Frequency of Communication with Friends and Family: Not on file      Frequency of Social Gatherings with Friends and Family: Once a week      Attends Sabianist Services: Not on file      Active Member of Clubs or Organizations: Not on file      Attends Club or Organization Meetings: Not on file      Marital Status: Not on file   Interpersonal Safety: Low Risk  (5/16/2024)    Interpersonal Safety      Do you feel physically and emotionally safe where you currently live?: Yes      Within the past 12 months, have you been hit, slapped, kicked or otherwise physically hurt by someone?: No      Within the past 12 months, have you been humiliated or emotionally abused in other ways by your partner or ex-partner?: No   Housing Stability: Low Risk  (5/9/2024)    Housing Stability      Do you have housing? : Yes      Are you worried about losing your housing?: No       FAMILY HISTORY:  Family History   Problem Relation Age  "of Onset     Other - See Comments Mother          in MVA at age 51     C.A.D. Father 61        age 61     Thyroid Disease Father      Coronary Artery Disease Sister      No Known Problems Sister      C.A.D. Brother 42        age 42     Depression Brother         suicide     Mental Illness Brother      Mental Illness Brother         anxiety     Coronary Artery Disease Brother      Parkinsonism Brother      No Known Problems Maternal Grandmother      No Known Problems Maternal Grandfather      Cancer Paternal Grandmother         stomach ca     Cancer Paternal Grandfather         stomach ca     Pancreatic Cancer Cousin         1st cousin     Colon Cancer No family hx of        Past/family/social history reviewed and no changes    PHYSICAL EXAMINATION:  Constitutional: aaox3, cooperative, pleasant, not dyspneic/diaphoretic, no acute distress  Vitals reviewed: /81 (BP Location: Left arm, Patient Position: Sitting, Cuff Size: Adult Regular)   Pulse 73   Ht 1.753 m (5' 9\")   Wt 78.3 kg (172 lb 9.6 oz)   SpO2 99%   BMI 25.49 kg/m    Wt:   Wt Readings from Last 2 Encounters:   24 78.3 kg (172 lb 9.6 oz)   24 78 kg (172 lb)      Eyes: Sclera anicteric/injected  CV: No edema  Respiratory: Unlabored breathing  Skin: warm, perfused, no jaundice  Psych: Normal affect  MSK: Normal gait                      Again, thank you for allowing me to participate in the care of your patient.        Sincerely,        Ryan Pradhan PA-C  "

## 2024-07-30 NOTE — PROGRESS NOTES
FOLLOW UP GI CLINIC VISIT    CC/REFERRING MD:  Ryan Pradhan  REASON FOR CONSULTATION:   Ryan Pradhan for   Chief Complaint   Patient presents with    Follow Up     4 month follow up for IBS with diarrhea.       ASSESSMENT/PLAN: Patient here for follow-up of likely IBS-D based on his workup thus far.  Limited improvement with Bentyl and Metamucil, rifaximin provided no improvement.  Has had some improvement with dietary change.  Most recently, has probably seen the most improvement with low-dose nortriptyline.  We will cautiously titrate up to 20 mg for couple of weeks, and if tolerating, he will go up to 30 mg.  Plan to update ECG today.  Again reviewed risks of potential side effects and he will let me know if any occur.  If he does not see much improvement with increased dosing up to 30 mg, we will plan to taper down and consider alternative therapies, which may include dedicated nutritional consult.  Would also consider Viberzi.  Will follow-up with patient in 1 month over the [facility].    1. Irritable bowel syndrome with diarrhea  - Adult GI Clinic Follow-Up Order (Blank)  - nortriptyline (PAMELOR) 10 MG capsule; Take 3 capsules (30 mg) by mouth at bedtime  Dispense: 60 capsule; Refill: 0    2. High risk medication use  - EKG 12-lead complete w/read - Clinics      Thank you for this consultation.  It was a pleasure to participate in the care of this patient; please contact us with any further questions.      20 minutes spent on the date of the encounter doing chart review, patient visit, and documentation    This note was created with voice recognition software, and while reviewed for accuracy, typos may remain.     Ryan Pradhan PA-C  Division of Gastroenterology, Hepatology and Nutrition  [facility]  Tavo ACEVEDO Alex is a 64 year old male with a past medical history significant for hypothyroidism and hyperlipidemia that is seen for follow up in the GI clinic  "today for follow-up.  To review, I initially met with patient a little over a year ago for symptoms of recurrent abdominal cramping with associated loose stool.  His workup included evaluation for infectious and inflammatory diarrhea, all of which was essentially normal.  We sent him for a colonoscopy to rule out microscopic colitis, which did not show any signs of IBD and random biopsies were normal.  We started him on Metamucil which helped form up his stools to some degree, but still was having frequent stools with gas and cramping.  A trial of rifaximin was pursued without any improvement.  From there, I had him trial Bentyl with symptom onset.  He had some mild improvement with this, but not significant.  He did some exploration of dietary change, eliminated milk products for the most part as well as spicy foods.  He otherwise was not experiencing any alarm symptoms.  After his last visit with me about 4 months ago, we referred him for a CT scan.  This did not show any abnormal findings.  After that, I elected to start him on nortriptyline 10 mg nightly.  He notes that this has improved some of his symptoms, helped his GI system \"calm down\", having less bloating, urgency.  Still having mostly unformed stools typically 2-3 times per day, and then will have flares of symptoms.  He will have more frequent bowel movements and more abdominal distress.  He seems to be tolerating nortriptyline well, not having any specific anticholinergic effects.    ROS:    10 point ROS neg other than the symptoms noted above in the HPI.    PREVIOUS ENDOSCOPY:  Reviewed, see HPI    PERTINENT RELEVANT IMAGING OR LABS:  Reviewed    ALLERGIES:     Allergies   Allergen Reactions    Amoxicillin Rash     rash    Azithromycin Tinnitus     ?    Sulfamethoxazole-Trimethoprim Rash       PERTINENT MEDICATIONS:    Current Outpatient Medications:     brimonidine-timolol (COMBIGAN) 0.2-0.5 % ophthalmic solution, , Disp: , Rfl:     dicyclomine " (BENTYL) 20 MG tablet, Take 1 tablet (20 mg) by mouth 4 times daily as needed (abdominal pain, diarrhea), Disp: 90 tablet, Rfl: 1    levothyroxine (SYNTHROID/LEVOTHROID) 50 MCG tablet, Take 1 tablet (50 mcg) by mouth daily, Disp: 90 tablet, Rfl: 3    nortriptyline (PAMELOR) 10 MG capsule, Take 3 capsules (30 mg) by mouth at bedtime, Disp: 60 capsule, Rfl: 0    pravastatin (PRAVACHOL) 20 MG tablet, Take 1 tablet (20 mg) by mouth daily, Disp: 90 tablet, Rfl: 3    XALATAN 0.005 % OP SOLN, None Entered, Disp: , Rfl:     Current Facility-Administered Medications:     sodium chloride (PF) 0.9% PF flush 3 mL, 3 mL, Intravenous, q1 min prn, Amol Rodríguez PA-C, 3 mL at 05/23/23 1132    PROBLEM LIST  Patient Active Problem List    Diagnosis Date Noted    Hyperlipidemia LDL goal <130 10/31/2010     Priority: High    Irritable bowel syndrome 08/08/2023     Priority: Medium     IBS - D      Environmental allergies      Priority: Medium     seasonal - spring      PVC (premature ventricular contraction)      Priority: Medium    Subclinical hypothyroidism      Priority: Medium    Adenomatous polyp of colon      Priority: Medium    Seasonal allergies      Priority: Medium     seasonal - spring      Glaucoma 05/01/2005     Priority: Medium     [facility] - [facility]         PERTINENT PAST MEDICAL HISTORY:  Past Medical History:   Diagnosis Date    Adenomatous polyp of colon 09/09/2010    tubular - due 5 yrs    Environmental allergies     seasonal - spring    Epididymitis 2023    Glaucoma 05/2005    [facility] - [facility]    Hyperlipidemia LDL goal <130     borderline    Irritable bowel syndrome     IBS - D    PVC (premature ventricular contraction) 12/2015    Subclinical hypothyroidism 09/2009       PREVIOUS SURGERIES:  Past Surgical History:   Procedure Laterality Date    COLONOSCOPY  9/09, 10/14, 11/19    History of  tubular adenomatous polyps - due 5 yrs - internal hemorrhoids    COLONOSCOPY N/A 07/25/2023    Hx of  tubular adenoma - due 5 years    STRESS ECHO ([facility])  3/07, 10/09, 12/11, 1/14    normal    SURGICAL HISTORY OF -  Bilateral 06/1987    HERNIA - JUNIOR    SURGICAL HISTORY OF -  Right 1996    RT SHOULDER - Dr Julien    VASECTOMY  1994       SOCIAL HISTORY:  Social History     Socioeconomic History    Marital status:      Spouse name: Maribell    Number of children: 3    Years of education: 16    Highest education level: Not on file   Occupational History     Employer: [employer]   Tobacco Use    Smoking status: Never    Smokeless tobacco: Never   Vaping Use    Vaping status: Never Used   Substance and Sexual Activity    Alcohol use: Not Currently     Alcohol/week: 0.0 - 1.0 standard drinks of alcohol     Comment: 1 drink per week    Drug use: No    Sexual activity: Yes     Partners: Female     Birth control/protection: Male Surgical   Other Topics Concern     Service No    Blood Transfusions No    Caffeine Concern Yes     Comment: 1-2 cups qd    Occupational Exposure No    Hobby Hazards No    Sleep Concern No    Stress Concern No    Weight Concern No    Special Diet Not Asked    Back Care Not Asked    Exercise Yes     Comment: lifts and treadmill - Wii Fit - [employer] - 2-3/week    Bike Helmet Not Asked    Seat Belt Yes    Self-Exams Not Asked    Parent/sibling w/ CABG, MI or angioplasty before 65F 55M? Yes   Social History Narrative    Not on file     Social Determinants of Health     Financial Resource Strain: Low Risk  (5/9/2024)    Financial Resource Strain     Within the past 12 months, have you or your family members you live with been unable to get utilities (heat, electricity) when it was really needed?: No   Food Insecurity: Low Risk  (5/9/2024)    Food Insecurity     Within the past 12 months, did you worry that your food would run out before you got money to buy more?: No     Within the past 12 months, did the food you bought just not last and you didn t have money to get more?: No    Transportation Needs: Low Risk  (2024)    Transportation Needs     Within the past 12 months, has lack of transportation kept you from medical appointments, getting your medicines, non-medical meetings or appointments, work, or from getting things that you need?: No   Physical Activity: Sufficiently Active (2024)    Exercise Vital Sign     Days of Exercise per Week: 4 days     Minutes of Exercise per Session: 60 min   Stress: No Stress Concern Present (2024)    [place] [employer]ational [employer] - Occupational Stress Questionnaire     Feeling of Stress : Not at all   Social Connections: Unknown (2024)    Social Connection and Isolation Panel [NHANES]     Frequency of Communication with Friends and Family: Not on file     Frequency of Social Gatherings with Friends and Family: Once a week     Attends Islam Services: Not on file     Active Member of Clubs or Organizations: Not on file     Attends Club or Organization Meetings: Not on file     Marital Status: Not on file   Interpersonal Safety: Low Risk  (2024)    Interpersonal Safety     Do you feel physically and emotionally safe where you currently live?: Yes     Within the past 12 months, have you been hit, slapped, kicked or otherwise physically hurt by someone?: No     Within the past 12 months, have you been humiliated or emotionally abused in other ways by your partner or ex-partner?: No   Housing Stability: Low Risk  (2024)    Housing Stability     Do you have housing? : Yes     Are you worried about losing your housing?: No       FAMILY HISTORY:  Family History   Problem Relation Age of Onset    Other - See Comments Mother          in MVA at age 51    C.A.D. Father 61        age 61    Thyroid Disease Father     Coronary Artery Disease Sister     No Known Problems Sister     C.A.D. Brother 42        age 42    Depression Brother         suicide    Mental Illness Brother     Mental Illness Brother         anxiety     "Coronary Artery Disease Brother     Parkinsonism Brother     No Known Problems Maternal Grandmother     No Known Problems Maternal Grandfather     Cancer Paternal Grandmother         stomach ca    Cancer Paternal Grandfather         stomach ca    Pancreatic Cancer Cousin         1st cousin    Colon Cancer No family hx of        Past/family/social history reviewed and no changes    PHYSICAL EXAMINATION:  Constitutional: aaox3, cooperative, pleasant, not dyspneic/diaphoretic, no acute distress  Vitals reviewed: /81 (BP Location: Left arm, Patient Position: Sitting, Cuff Size: Adult Regular)   Pulse 73   Ht 1.753 m (5' 9\")   Wt 78.3 kg (172 lb 9.6 oz)   SpO2 99%   BMI 25.49 kg/m    Wt:   Wt Readings from Last 2 Encounters:   07/30/24 78.3 kg (172 lb 9.6 oz)   05/16/24 78 kg (172 lb)      Eyes: Sclera anicteric/injected  CV: No edema  Respiratory: Unlabored breathing  Skin: warm, perfused, no jaundice  Psych: Normal affect  MSK: Normal gait                    "

## 2024-07-31 NOTE — RESULT ENCOUNTER NOTE
Results discussed directly with patient while patient was present. Any further details documented in the note.   Ryan Pradhan PA-C

## 2024-08-29 ENCOUNTER — MYC MEDICAL ADVICE (OUTPATIENT)
Dept: GASTROENTEROLOGY | Facility: CLINIC | Age: 64
End: 2024-08-29
Payer: COMMERCIAL

## 2024-08-29 DIAGNOSIS — K58.0 IRRITABLE BOWEL SYNDROME WITH DIARRHEA: Primary | ICD-10-CM

## 2024-09-04 RX ORDER — NORTRIPTYLINE HYDROCHLORIDE 50 MG/1
50 CAPSULE ORAL AT BEDTIME
Qty: 30 CAPSULE | Refills: 1 | Status: SHIPPED | OUTPATIENT
Start: 2024-09-04 | End: 2024-09-20

## 2024-09-20 ENCOUNTER — MYC MEDICAL ADVICE (OUTPATIENT)
Dept: GASTROENTEROLOGY | Facility: CLINIC | Age: 64
End: 2024-09-20
Payer: COMMERCIAL

## 2024-09-20 DIAGNOSIS — K58.0 IRRITABLE BOWEL SYNDROME WITH DIARRHEA: ICD-10-CM

## 2024-09-20 RX ORDER — NORTRIPTYLINE HCL 10 MG
30 CAPSULE ORAL AT BEDTIME
Qty: 90 CAPSULE | Refills: 2 | Status: SHIPPED | OUTPATIENT
Start: 2024-09-20

## 2025-01-03 ENCOUNTER — MYC MEDICAL ADVICE (OUTPATIENT)
Dept: GASTROENTEROLOGY | Facility: CLINIC | Age: 65
End: 2025-01-03
Payer: COMMERCIAL

## 2025-01-03 DIAGNOSIS — K58.0 IRRITABLE BOWEL SYNDROME WITH DIARRHEA: ICD-10-CM

## 2025-01-06 RX ORDER — NORTRIPTYLINE HYDROCHLORIDE 10 MG/1
30 CAPSULE ORAL AT BEDTIME
Qty: 270 CAPSULE | Refills: 0 | Status: SHIPPED | OUTPATIENT
Start: 2025-01-06

## 2025-04-09 ENCOUNTER — MYC REFILL (OUTPATIENT)
Dept: GASTROENTEROLOGY | Facility: CLINIC | Age: 65
End: 2025-04-09
Payer: COMMERCIAL

## 2025-04-09 DIAGNOSIS — K58.0 IRRITABLE BOWEL SYNDROME WITH DIARRHEA: ICD-10-CM

## 2025-04-09 RX ORDER — NORTRIPTYLINE HYDROCHLORIDE 10 MG/1
30 CAPSULE ORAL AT BEDTIME
Qty: 270 CAPSULE | Refills: 0 | Status: SHIPPED | OUTPATIENT
Start: 2025-04-09

## 2025-04-09 NOTE — TELEPHONE ENCOUNTER
nortriptyline (PAMELOR) 10       Last Written Prescription Date:  1/6/25  Last Fill Quantity: 27-,  # refills: 0   Last office visit: 7/30/2024 ;  with prescribing provider:  Ryan Pradhan.   Future Office Visit:  none    Routing refill request to provider for review/approval because:  Flow Studiot message from provider on 1/6/25 1/6/25  8:27 AM  Noel Vo,     Thank you for the update, glad to hear that things are reaching near normal.  I think it would be reasonable to continue on the 30 mg for another 3 months and if things remain very stable, we can consider cautiously tapering down.  I sent a refill of the medication to your pharmacy.

## 2025-06-13 SDOH — HEALTH STABILITY: PHYSICAL HEALTH: ON AVERAGE, HOW MANY MINUTES DO YOU ENGAGE IN EXERCISE AT THIS LEVEL?: 60 MIN

## 2025-06-13 SDOH — HEALTH STABILITY: PHYSICAL HEALTH: ON AVERAGE, HOW MANY DAYS PER WEEK DO YOU ENGAGE IN MODERATE TO STRENUOUS EXERCISE (LIKE A BRISK WALK)?: 3 DAYS

## 2025-06-13 ASSESSMENT — ANXIETY QUESTIONNAIRES
GAD7 TOTAL SCORE: 0
3. WORRYING TOO MUCH ABOUT DIFFERENT THINGS: NOT AT ALL
2. NOT BEING ABLE TO STOP OR CONTROL WORRYING: NOT AT ALL
5. BEING SO RESTLESS THAT IT IS HARD TO SIT STILL: NOT AT ALL
IF YOU CHECKED OFF ANY PROBLEMS ON THIS QUESTIONNAIRE, HOW DIFFICULT HAVE THESE PROBLEMS MADE IT FOR YOU TO DO YOUR WORK, TAKE CARE OF THINGS AT HOME, OR GET ALONG WITH OTHER PEOPLE: NOT DIFFICULT AT ALL
7. FEELING AFRAID AS IF SOMETHING AWFUL MIGHT HAPPEN: NOT AT ALL
GAD7 TOTAL SCORE: 0
4. TROUBLE RELAXING: NOT AT ALL
8. IF YOU CHECKED OFF ANY PROBLEMS, HOW DIFFICULT HAVE THESE MADE IT FOR YOU TO DO YOUR WORK, TAKE CARE OF THINGS AT HOME, OR GET ALONG WITH OTHER PEOPLE?: NOT DIFFICULT AT ALL
6. BECOMING EASILY ANNOYED OR IRRITABLE: NOT AT ALL
1. FEELING NERVOUS, ANXIOUS, OR ON EDGE: NOT AT ALL
GAD7 TOTAL SCORE: 0
7. FEELING AFRAID AS IF SOMETHING AWFUL MIGHT HAPPEN: NOT AT ALL

## 2025-06-13 ASSESSMENT — PATIENT HEALTH QUESTIONNAIRE - PHQ9
SUM OF ALL RESPONSES TO PHQ QUESTIONS 1-9: 0
SUM OF ALL RESPONSES TO PHQ QUESTIONS 1-9: 0

## 2025-06-13 ASSESSMENT — SOCIAL DETERMINANTS OF HEALTH (SDOH): HOW OFTEN DO YOU GET TOGETHER WITH FRIENDS OR RELATIVES?: TWICE A WEEK

## 2025-06-16 ENCOUNTER — OFFICE VISIT (OUTPATIENT)
Dept: FAMILY MEDICINE | Facility: CLINIC | Age: 65
End: 2025-06-16
Attending: FAMILY MEDICINE
Payer: COMMERCIAL

## 2025-06-16 VITALS
OXYGEN SATURATION: 98 % | RESPIRATION RATE: 14 BRPM | DIASTOLIC BLOOD PRESSURE: 80 MMHG | SYSTOLIC BLOOD PRESSURE: 120 MMHG | HEART RATE: 82 BPM | TEMPERATURE: 97.6 F | BODY MASS INDEX: 26.7 KG/M2 | HEIGHT: 69 IN | WEIGHT: 180.3 LBS

## 2025-06-16 DIAGNOSIS — Z12.5 SCREENING FOR PROSTATE CANCER: ICD-10-CM

## 2025-06-16 DIAGNOSIS — K58.0 IRRITABLE BOWEL SYNDROME WITH DIARRHEA: ICD-10-CM

## 2025-06-16 DIAGNOSIS — D12.6 ADENOMATOUS POLYP OF COLON, UNSPECIFIED PART OF COLON: ICD-10-CM

## 2025-06-16 DIAGNOSIS — H40.9 GLAUCOMA OF BOTH EYES, UNSPECIFIED GLAUCOMA TYPE: ICD-10-CM

## 2025-06-16 DIAGNOSIS — E78.5 HYPERLIPIDEMIA LDL GOAL <130: ICD-10-CM

## 2025-06-16 DIAGNOSIS — I49.3 PVC (PREMATURE VENTRICULAR CONTRACTION): ICD-10-CM

## 2025-06-16 DIAGNOSIS — Z12.11 SCREEN FOR COLON CANCER: ICD-10-CM

## 2025-06-16 DIAGNOSIS — Z00.00 ROUTINE GENERAL MEDICAL EXAMINATION AT A HEALTH CARE FACILITY: Primary | ICD-10-CM

## 2025-06-16 DIAGNOSIS — Z91.09 ENVIRONMENTAL ALLERGIES: ICD-10-CM

## 2025-06-16 DIAGNOSIS — Z00.00 MEDICARE ANNUAL WELLNESS VISIT, SUBSEQUENT: ICD-10-CM

## 2025-06-16 DIAGNOSIS — Z51.81 MEDICATION MONITORING ENCOUNTER: ICD-10-CM

## 2025-06-16 DIAGNOSIS — E03.8 SUBCLINICAL HYPOTHYROIDISM: ICD-10-CM

## 2025-06-16 LAB
ALBUMIN SERPL BCG-MCNC: 4.5 G/DL (ref 3.5–5.2)
ALBUMIN UR-MCNC: NEGATIVE MG/DL
ALP SERPL-CCNC: 79 U/L (ref 40–150)
ALT SERPL W P-5'-P-CCNC: 20 U/L (ref 0–70)
ANION GAP SERPL CALCULATED.3IONS-SCNC: 9 MMOL/L (ref 7–15)
APPEARANCE UR: CLEAR
AST SERPL W P-5'-P-CCNC: 24 U/L (ref 0–45)
BILIRUB SERPL-MCNC: 1.4 MG/DL
BILIRUB UR QL STRIP: NEGATIVE
BUN SERPL-MCNC: 16.2 MG/DL (ref 8–23)
CALCIUM SERPL-MCNC: 9.6 MG/DL (ref 8.8–10.4)
CHLORIDE SERPL-SCNC: 104 MMOL/L (ref 98–107)
CHOLEST SERPL-MCNC: 183 MG/DL
CK SERPL-CCNC: 111 U/L (ref 39–308)
COLOR UR AUTO: YELLOW
CREAT SERPL-MCNC: 1.1 MG/DL (ref 0.67–1.17)
CREAT UR-MCNC: 109 MG/DL
EGFRCR SERPLBLD CKD-EPI 2021: 74 ML/MIN/1.73M2
ERYTHROCYTE [DISTWIDTH] IN BLOOD BY AUTOMATED COUNT: 12.9 % (ref 10–15)
FASTING STATUS PATIENT QL REPORTED: YES
FASTING STATUS PATIENT QL REPORTED: YES
GLUCOSE SERPL-MCNC: 92 MG/DL (ref 70–99)
GLUCOSE UR STRIP-MCNC: NEGATIVE MG/DL
HCO3 SERPL-SCNC: 26 MMOL/L (ref 22–29)
HCT VFR BLD AUTO: 46.5 % (ref 40–53)
HDLC SERPL-MCNC: 42 MG/DL
HGB BLD-MCNC: 15.3 G/DL (ref 13.3–17.7)
HGB UR QL STRIP: NEGATIVE
KETONES UR STRIP-MCNC: NEGATIVE MG/DL
LDLC SERPL CALC-MCNC: 118 MG/DL
LEUKOCYTE ESTERASE UR QL STRIP: NEGATIVE
MCH RBC QN AUTO: 31 PG (ref 26.5–33)
MCHC RBC AUTO-ENTMCNC: 32.9 G/DL (ref 31.5–36.5)
MCV RBC AUTO: 94 FL (ref 78–100)
MICROALBUMIN UR-MCNC: <12 MG/L
MICROALBUMIN/CREAT UR: NORMAL MG/G{CREAT}
NITRATE UR QL: NEGATIVE
NONHDLC SERPL-MCNC: 141 MG/DL
PH UR STRIP: 6.5 [PH] (ref 5–7)
PLATELET # BLD AUTO: 183 10E3/UL (ref 150–450)
POTASSIUM SERPL-SCNC: 4.9 MMOL/L (ref 3.4–5.3)
PROT SERPL-MCNC: 7.5 G/DL (ref 6.4–8.3)
PSA SERPL DL<=0.01 NG/ML-MCNC: 0.72 NG/ML (ref 0–4.5)
RBC # BLD AUTO: 4.93 10E6/UL (ref 4.4–5.9)
SODIUM SERPL-SCNC: 139 MMOL/L (ref 135–145)
SP GR UR STRIP: 1.02 (ref 1–1.03)
T4 FREE SERPL-MCNC: 1.26 NG/DL (ref 0.9–1.7)
TRIGL SERPL-MCNC: 115 MG/DL
TSH SERPL DL<=0.005 MIU/L-ACNC: 3.14 UIU/ML (ref 0.3–4.2)
UROBILINOGEN UR STRIP-ACNC: 0.2 E.U./DL
WBC # BLD AUTO: 5.1 10E3/UL (ref 4–11)

## 2025-06-16 RX ORDER — NORTRIPTYLINE HYDROCHLORIDE 10 MG/1
30 CAPSULE ORAL AT BEDTIME
Qty: 270 CAPSULE | Refills: 3 | Status: SHIPPED | OUTPATIENT
Start: 2025-06-16

## 2025-06-16 RX ORDER — LEVOTHYROXINE SODIUM 50 UG/1
50 TABLET ORAL DAILY
Qty: 90 TABLET | Refills: 3 | Status: SHIPPED | OUTPATIENT
Start: 2025-06-16

## 2025-06-16 RX ORDER — PRAVASTATIN SODIUM 20 MG
20 TABLET ORAL DAILY
Qty: 90 TABLET | Refills: 3 | Status: SHIPPED | OUTPATIENT
Start: 2025-06-16

## 2025-06-16 NOTE — PROGRESS NOTES
Preventive Care Visit  [facility] [place]  Reza Lynn MD, Family Medicine  Jun 16, 2025      Assessment & Plan     Routine general medical examination at a health care facility    - Comprehensive metabolic panel  - Lipid panel reflex to direct LDL Fasting  - CBC with platelets  - CK total  - UA Macroscopic with reflex to Microscopic and Culture  - Albumin Random Urine Quantitative with Creat Ratio  - Prostate Specific Antigen Screen  - Fecal colorectal cancer screen FIT  - TSH  - T4, free  - PRIMARY CARE FOLLOW-UP SCHEDULING  - REVIEW OF HEALTH MAINTENANCE PROTOCOL ORDERS    Medicare annual wellness visit, subsequent    - Comprehensive metabolic panel  - Lipid panel reflex to direct LDL Fasting  - CBC with platelets  - CK total  - UA Macroscopic with reflex to Microscopic and Culture  - Albumin Random Urine Quantitative with Creat Ratio  - Prostate Specific Antigen Screen  - Fecal colorectal cancer screen FIT  - TSH  - T4, free  - PRIMARY CARE FOLLOW-UP SCHEDULING  - REVIEW OF HEALTH MAINTENANCE PROTOCOL ORDERS    Hyperlipidemia LDL goal <130    - Comprehensive metabolic panel  - Lipid panel reflex to direct LDL Fasting  - CK total  - PRIMARY CARE FOLLOW-UP SCHEDULING  - REVIEW OF HEALTH MAINTENANCE PROTOCOL ORDERS  - pravastatin (PRAVACHOL) 20 MG tablet  Dispense: 90 tablet; Refill: 3  - PRIMARY CARE FOLLOW-UP SCHEDULING  - OFFICE/OUTPT VISIT,EST,LEVL IV    Subclinical hypothyroidism    - TSH  - T4, free  - PRIMARY CARE FOLLOW-UP SCHEDULING  - REVIEW OF HEALTH MAINTENANCE PROTOCOL ORDERS  - levothyroxine (SYNTHROID/LEVOTHROID) 50 MCG tablet  Dispense: 90 tablet; Refill: 3  - PRIMARY CARE FOLLOW-UP SCHEDULING  - OFFICE/OUTPT VISIT,EST,LEVL IV    PVC (premature ventricular contraction)    - PRIMARY CARE FOLLOW-UP SCHEDULING  - REVIEW OF HEALTH MAINTENANCE PROTOCOL ORDERS  - PRIMARY CARE FOLLOW-UP SCHEDULING  - OFFICE/OUTPT VISIT,EST,LEVL IV    Environmental allergies    - PRIMARY CARE FOLLOW-UP  SCHEDULING  - REVIEW OF HEALTH MAINTENANCE PROTOCOL ORDERS  - PRIMARY CARE FOLLOW-UP SCHEDULING  - OFFICE/OUTPT VISIT,EST,LEVL IV    Irritable bowel syndrome with diarrhea    - Comprehensive metabolic panel  - CBC with platelets  - PRIMARY CARE FOLLOW-UP SCHEDULING  - REVIEW OF HEALTH MAINTENANCE PROTOCOL ORDERS  - nortriptyline (PAMELOR) 10 MG capsule  Dispense: 270 capsule; Refill: 3  - PRIMARY CARE FOLLOW-UP SCHEDULING  - OFFICE/OUTPT VISIT,EST,LEVL IV    Glaucoma of both eyes, unspecified glaucoma type    - PRIMARY CARE FOLLOW-UP SCHEDULING  - REVIEW OF HEALTH MAINTENANCE PROTOCOL ORDERS  - PRIMARY CARE FOLLOW-UP SCHEDULING  - OFFICE/OUTPT VISIT,EST,LEVL IV    Adenomatous polyp of colon, unspecified part of colon    - Fecal colorectal cancer screen FIT  - PRIMARY CARE FOLLOW-UP SCHEDULING  - REVIEW OF HEALTH MAINTENANCE PROTOCOL ORDERS  - PRIMARY CARE FOLLOW-UP SCHEDULING  - OFFICE/OUTPT VISIT,EST,LEVL IV    Screen for colon cancer    - Fecal colorectal cancer screen FIT  - PRIMARY CARE FOLLOW-UP SCHEDULING  - REVIEW OF HEALTH MAINTENANCE PROTOCOL ORDERS  - PRIMARY CARE FOLLOW-UP SCHEDULING  - OFFICE/OUTPT VISIT,EST,LEVL IV    Screening for prostate cancer    - Prostate Specific Antigen Screen  - PRIMARY CARE FOLLOW-UP SCHEDULING  - REVIEW OF HEALTH MAINTENANCE PROTOCOL ORDERS  - PRIMARY CARE FOLLOW-UP SCHEDULING  - OFFICE/OUTPT VISIT,EST,LEVL IV    Medication monitoring encounter    - Comprehensive metabolic panel  - Lipid panel reflex to direct LDL Fasting  - CBC with platelets  - CK total  - UA Macroscopic with reflex to Microscopic and Culture  - Albumin Random Urine Quantitative with Creat Ratio  - Prostate Specific Antigen Screen  - Fecal colorectal cancer screen FIT  - TSH  - T4, free  - PRIMARY CARE FOLLOW-UP SCHEDULING  - REVIEW OF HEALTH MAINTENANCE PROTOCOL ORDERS  - PRIMARY CARE FOLLOW-UP SCHEDULING  - OFFICE/OUTPT VISIT,EST,LEVL IV    Patient has been advised of split billing requirements and  "indicates understanding: Yes    BMI  Estimated body mass index is 26.63 kg/m  as calculated from the following:    Height as of this encounter: 1.753 m (5' 9\").    Weight as of this encounter: 81.8 kg (180 lb 4.8 oz).       Counseling  Appropriate preventive services were addressed with this patient via screening, questionnaire, or discussion as appropriate for fall prevention, nutrition, physical activity, Tobacco-use cessation, social engagement, weight loss and cognition.  Checklist reviewing preventive services available has been given to the patient.  Reviewed patient's diet, addressing concerns and/or questions.   He is at risk for lack of exercise and has been provided with information to increase physical activity for the benefit of his well-being.   The patient was provided with written information regarding signs of hearing loss.     Plan:    1) Medications: reviewed, refilled    2) Labs: pending    3) Immunizations: advised covid / flu / rsv / tdap    4) Imaging/Diagnostics: NA    5) Consults: GI / Ophthalmology    Return in about 1 year (around 6/16/2026) for Complete Physical, Medication Recheck Visit, Follow Up Chronic.    31 minutes spent by myself on the date of the encounter doing chart review, history and exam, documentation and further activities per the note.    The longitudinal plan of care for the diagnosis(es)/condition(s) as documented were addressed during this visit. Due to the added complexity in care, I will continue to support Pat in the subsequent management and with ongoing continuity of care.    Shared Decision making completed.    Steven Vo is a 64 year old, presenting for the following:  Wellness Visit (Wellness Visit )        6/16/2025     8:24 AM   Additional Questions   Roomed by Larry   Accompanied by Self        HPI  Wellness Visit     Hyperlipidemia Follow-Up    Are you regularly taking any medication or supplement to lower your cholesterol?   Yes- Every Evening   Are " you having muscle aches or other side effects that you think could be caused by your cholesterol lowering medication?  No    Recent Labs   Lab Test 05/16/24  1025 11/13/23  0829   CHOL 175 174   HDL 44 43   * 109*   TRIG 78 109     Hypothyroid    TSH   Date Value Ref Range Status   05/16/2024 3.87 0.30 - 4.20 uIU/mL Final   05/11/2022 3.17 0.40 - 4.00 mU/L Final   04/30/2021 2.96 0.40 - 4.00 mU/L Final     PVC's - no issues - no symptoms    Environmental allergies - improved - no issues    IBS - uses metamucil and nortriptyline - controlled ok    Glaucoma / cataract - controlled with drops - ophthalmology 4 times per year    Advance Care Planning    Discussed advance care planning with patient; informed AVS has link to [facility].        6/13/2025   General Health   How would you rate your overall physical health? Excellent   Feel stress (tense, anxious, or unable to sleep) Not at all         6/13/2025   Nutrition   Diet: Regular (no restrictions)         6/13/2025   Exercise   Days per week of moderate/strenous exercise 3 days   Average minutes spent exercising at this level 60 min         6/13/2025   Social Factors   Frequency of gathering with friends or relatives Twice a week   Worry food won't last until get money to buy more No   Food not last or not have enough money for food? No   Do you have housing? (Housing is defined as stable permanent housing and does not include staying outside in a car, in a tent, in an abandoned building, in an overnight shelter, or couch-surfing.) Yes   Are you worried about losing your housing? No   Lack of transportation? No   Unable to get utilities (heat,electricity)? No         6/13/2025   Fall Risk   Fallen 2 or more times in the past year? No   Trouble with walking or balance? No          6/13/2025   Activities of Daily Living- Home Safety   Needs help with the following daily activites None of the above   Safety concerns in the home None of the above          6/13/2025   Dental   Dentist two times every year? Yes         6/13/2025   Hearing Screening   Hearing concerns? (!) IT'S HARD TO FOLLOW A CONVERSATION IN A NOISY RESTAURANT OR CROWDED ROOM.         6/13/2025   Driving Risk Screening   Patient/family members have concerns about driving No         6/13/2025   General Alertness/Fatigue Screening   Have you been more tired than usual lately? No         6/13/2025   Urinary Incontinence Screening   Bothered by leaking urine in past 6 months No       Today's PHQ-9 Score:       6/13/2025     4:43 PM   PHQ-9 SCORE   PHQ-9 Total Score MyChart 0   PHQ-9 Total Score 0        Patient-reported         6/13/2025   Substance Use   Alcohol more than 3/day or more than 7/wk No   Do you have a current opioid prescription? No   How severe/bad is pain from 1 to 10? 0/10 (No Pain)   Do you use any other substances recreationally? No     Social History     Tobacco Use    Smoking status: Never     Passive exposure: Never    Smokeless tobacco: Never   Vaping Use    Vaping status: Never Used   Substance Use Topics    Alcohol use: Yes     Alcohol/week: 0.0 - 1.0 standard drinks of alcohol     Comment: 1 drink per week    Drug use: No           6/13/2025   AAA Screening   Family history of Abdominal Aortic Aneurysm (AAA)? No   Last PSA:   PSA   Date Value Ref Range Status   04/30/2021 0.59 0 - 4 ug/L Final     Comment:     Assay Method:  Chemiluminescence using Siemens Vista analyzer     Prostate Specific Antigen Screen   Date Value Ref Range Status   05/16/2024 0.80 0.00 - 4.50 ng/mL Final   05/11/2022 0.53 0.00 - 4.00 ug/L Final     ASCVD Risk   The 10-year ASCVD risk score (Loretta TARIQ, et al., 2019) is: 12.1%    Values used to calculate the score:      Age: 64 years      Sex: Male      Is Non- : No      Diabetic: No      Tobacco smoker: No      Systolic Blood Pressure: 120 mmHg      Is BP treated: No      HDL Cholesterol: 44 mg/dL      Total Cholesterol: 175  mg/dL    Fracture Risk Assessment Tool  Link to Frax Calculator  Use the information below to complete the Frax calculator  : 1960  Sex: male  Weight (kg): 81.8 kg (actual weight)  Height (cm): 175.3 cm  Previous Fragility Fracture:  No  History of parent with fractured hip:  No  Current Smoking:  No  Patient has been on glucocorticoids for more than 3 months (5mg/day or more): No  Rheumatoid Arthritis on Problem List:  No  Secondary Osteoporosis on Problem List:  No  Consumes 3 or more units of alcohol per day: No  Femoral Neck BMD (g/cm2)                Reviewed and updated as needed this visit by Provider                  Current providers sharing in care for this patient include:  Patient Care Team:  Reza Lynn MD as PCP - General  Reza Lynn MD as Assigned PCP  Ryan Pradhan PA-C as Physician Assistant (Gastroenterology)  Amol Rodríguez PA-C as Physician Assistant (Internal Medicine)  Ryan Pradhan PA-C as Assigned Gastroenterology Provider    The following health maintenance items are reviewed in Epic and correct as of today:  Health Maintenance   Topic Date Due    RSV VACCINE (1 - Risk 60-74 years 1-dose series) Never done    COVID-19 VACCINE ( season) 2024    LIPID  2025    PSA  2025    TSH W/FREE T4 REFLEX  2025    T4 FREE  2025    INFLUENZA VACCINE (Season Ended) 2025    DTAP/TDAP/TD VACCINE (3 - Td or Tdap) 2025    MEDICARE ANNUAL WELLNESS VISIT  2026    ANNUAL REVIEW OF HM ORDERS  2026    FALL RISK ASSESSMENT  2026    DIABETES SCREENING  2027    COLORECTAL CANCER SCREENING  2028    ADVANCE CARE PLANNING  2029    HEPATITIS C SCREENING  Completed    PHQ-2 (once per calendar year)  Completed    PNEUMOCOCCAL VACCINE 50+ YEARS  Completed    ZOSTER VACCINE  Completed    HPV VACCINE  Aged Out    MENINGITIS VACCINE  Aged Out     Patient Active Problem List   Diagnosis    Seasonal allergies    Adenomatous  polyp of colon    Subclinical hypothyroidism    Hyperlipidemia LDL goal <130    PVC (premature ventricular contraction)    Environmental allergies    Glaucoma    Irritable bowel syndrome       Past Medical History:   Diagnosis Date    Adenomatous polyp of colon 2010    tubular - due 5 yrs    Environmental allergies     seasonal - spring    Epididymitis     Glaucoma 2005    [facility] - bilateral    Hyperlipidemia LDL goal <130     borderline    Irritable bowel syndrome     IBS - D - Ryan Pradhan, PAC    PVC (premature ventricular contraction) 2015    Subclinical hypothyroidism 2009       Past Surgical History:   Procedure Laterality Date    COLONOSCOPY  , 10/14,     History of  tubular adenomatous polyps - due 5 yrs - internal hemorrhoids    COLONOSCOPY N/A 2023    Hx of tubular adenoma - due 5 years    STRESS ECHO ([facility])  3/07, 10/09, ,     normal    SURGICAL HISTORY OF -  Bilateral 1987    HERNIA - JUNIOR    SURGICAL HISTORY OF -  Right     RT SHOULDER - Dr Julien    VASECTOMY         Current Outpatient Medications   Medication Sig Dispense Refill    brimonidine-timolol (COMBIGAN) 0.2-0.5 % ophthalmic solution       levothyroxine (SYNTHROID/LEVOTHROID) 50 MCG tablet Take 1 tablet (50 mcg) by mouth daily. 90 tablet 3    nortriptyline (PAMELOR) 10 MG capsule Take 3 capsules (30 mg) by mouth at bedtime. 270 capsule 3    pravastatin (PRAVACHOL) 20 MG tablet Take 1 tablet (20 mg) by mouth daily. 90 tablet 3    XALATAN 0.005 % OP SOLN None Entered         Allergies   Allergen Reactions    Amoxicillin Rash     rash    Azithromycin Tinnitus     ?    Sulfamethoxazole-Trimethoprim Rash       Family History   Problem Relation Age of Onset    Other - See Comments Mother          in MVA at age 51    C.A.D. Father 61        age 61,  at age 92    Thyroid Disease Father     Coronary Artery Disease Sister     Skin Cancer Sister     Depression Brother         suicide     Mental Illness Brother     Mental Illness Brother         anxiety    Coronary Artery Disease Brother         age 42    Parkinsonism Brother     Other - See Comments Brother          in child birth    Anxiety Disorder Brother     No Known Problems Maternal Grandmother     No Known Problems Maternal Grandfather     Cancer Paternal Grandmother         stomach ca    Cancer Paternal Grandfather         stomach ca    Pancreatic Cancer Cousin         1st cousin    Colon Cancer No family hx of        Social History     Socioeconomic History    Marital status:      Spouse name: Maribell    Number of children: 3    Years of education: 16    Highest education level: None   Occupational History     Employer: [employer]   Tobacco Use    Smoking status: Never     Passive exposure: Never    Smokeless tobacco: Never   Vaping Use    Vaping status: Never Used   Substance and Sexual Activity    Alcohol use: Yes     Alcohol/week: 0.0 - 1.0 standard drinks of alcohol     Comment: 1 drink per week    Drug use: No    Sexual activity: Yes     Partners: Female     Birth control/protection: Male Surgical   Other Topics Concern     Service No    Blood Transfusions No    Caffeine Concern Yes     Comment: 1-2 cups qd    Occupational Exposure No    Hobby Hazards No    Sleep Concern No    Stress Concern No    Weight Concern No    Exercise Yes     Comment: lifts and treadmill - Wii Fit - snap fitness - 2-3/week    Seat Belt Yes    Parent/sibling w/ CABG, MI or angioplasty before 65F 55M? Yes     Social Drivers of Health     Financial Resource Strain: Low Risk  (2025)    Financial Resource Strain     Within the past 12 months, have you or your family members you live with been unable to get utilities (heat, electricity) when it was really needed?: No   Food Insecurity: Low Risk  (2025)    Food Insecurity     Within the past 12 months, did you worry that your food would run out before you got money to buy more?: No      Within the past 12 months, did the food you bought just not last and you didn t have money to get more?: No   Transportation Needs: Low Risk  (6/13/2025)    Transportation Needs     Within the past 12 months, has lack of transportation kept you from medical appointments, getting your medicines, non-medical meetings or appointments, work, or from getting things that you need?: No   Physical Activity: Sufficiently Active (6/13/2025)    Exercise Vital Sign     Days of Exercise per Week: 3 days     Minutes of Exercise per Session: 60 min   Stress: No Stress Concern Present (6/13/2025)    [employer] [place] [employer] - Occupational Stress Questionnaire     Feeling of Stress : Not at all   Social Connections: Unknown (6/13/2025)    Social Connection and Isolation Panel [NHANES]     Frequency of Social Gatherings with Friends and Family: Twice a week   Interpersonal Safety: Low Risk  (6/16/2025)    Interpersonal Safety     Do you feel physically and emotionally safe where you currently live?: Yes     Within the past 12 months, have you been hit, slapped, kicked or otherwise physically hurt by someone?: No     Within the past 12 months, have you been humiliated or emotionally abused in other ways by your partner or ex-partner?: No   Housing Stability: Low Risk  (6/13/2025)    Housing Stability     Do you have housing? : Yes     Are you worried about losing your housing?: No       Colonoscopy:  due 7/2028  FIT / Cologuard: ordered  PSA: pending      Review of Systems  CONSTITUTIONAL: NEGATIVE for fever, chills, change in weight  INTEGUMENTARY/SKIN: NEGATIVE for worrisome rashes, moles or lesions  EYES: NEGATIVE for vision changes or irritation  ENT/MOUTH: NEGATIVE for ear, mouth and throat problems  RESP: NEGATIVE for significant cough or SOB  CV: NEGATIVE for chest pain, palpitations or peripheral edema  GI: NEGATIVE for nausea, abdominal pain, heartburn, or change in bowel habits  : NEGATIVE for  "frequency, dysuria, or hematuria  MUSCULOSKELETAL: NEGATIVE for significant arthralgias or myalgia  NEURO: NEGATIVE for weakness, dizziness or paresthesias  ENDOCRINE: NEGATIVE for temperature intolerance, skin/hair changes  HEME: NEGATIVE for bleeding problems  PSYCHIATRIC: NEGATIVE for changes in mood or affect     Objective    Exam  /80   Pulse 82   Temp 97.6  F (36.4  C) (Tympanic)   Resp 14   Ht 1.753 m (5' 9\")   Wt 81.8 kg (180 lb 4.8 oz)   SpO2 98%   BMI 26.63 kg/m     Estimated body mass index is 26.63 kg/m  as calculated from the following:    Height as of this encounter: 1.753 m (5' 9\").    Weight as of this encounter: 81.8 kg (180 lb 4.8 oz).    Physical Exam  GENERAL: alert and no distress  EYES: Eyes grossly normal to inspection, PERRL and conjunctivae and sclerae normal  HENT: ear canals and TM's normal, nose and mouth without ulcers or lesions  NECK: no adenopathy, no asymmetry, masses, or scars  RESP: lungs clear to auscultation - no rales, rhonchi or wheezes  CV: regular rate and rhythm, normal S1 S2, no S3 or S4, no murmur, click or rub, no peripheral edema  ABDOMEN: soft, nontender, no hepatosplenomegaly, no masses and bowel sounds normal   (male): testicles normal without atrophy or masses, no hernias, and penis normal without urethral discharge  RECTAL: normal sphincter tone, no rectal masses and prostate of normal size for age, smooth, nontender without masses/nodules  MS: no gross musculoskeletal defects noted, no edema  SKIN: no suspicious lesions or rashes  NEURO: Normal strength and tone, mentation intact and speech normal  PSYCH: mentation appears normal, affect normal/bright        6/16/2025   Mini Cog   Clock Draw Score 2 Normal    2 Normal   3 Item Recall 3 objects recalled   Mini Cog Total Score 5       Multiple values from one day are sorted in reverse-chronological order         Vision Screen         Signed Electronically by:            Reza Lynn MD, FAAFP, [facility]     " [facility]  [facility]  [address] [place] [address]  [email or web address]   Office: [phone]  Fax: [phone]       Answers submitted by the patient for this visit:  Patient Health Questionnaire (Submitted on 6/13/2025)  PHQ9 TOTAL SCORE: 0  Patient Health Questionnaire (G7) (Submitted on 6/13/2025)  WALTER 7 TOTAL SCORE: 0

## 2025-06-22 ENCOUNTER — RESULTS FOLLOW-UP (OUTPATIENT)
Dept: FAMILY MEDICINE | Facility: CLINIC | Age: 65
End: 2025-06-22
Payer: COMMERCIAL

## 2025-06-22 NOTE — LETTER
[facility]  [address] [place] [address]  [phone]                    June 23, 2025    Tavo Johnson  [address] [place] [id number]      Dear Tavo,    Here is a summary of your recent test results:  Labs are overall quite good   We advise:  Continue current cares.  Balanced low cholesterol diet, Mediterranean diet  Regular exercise, 150 minutes per week.    Your test results are enclosed.             Thank you very much for trusting [facility].     Healthy regards,          Reza Lynn M.D.        Results for orders placed or performed in visit on 06/16/25   Comprehensive metabolic panel     Status: Abnormal   Result Value Ref Range    Sodium 139 135 - 145 mmol/L    Potassium 4.9 3.4 - 5.3 mmol/L    Carbon Dioxide (CO2) 26 22 - 29 mmol/L    Anion Gap 9 7 - 15 mmol/L    Urea Nitrogen 16.2 8.0 - 23.0 mg/dL    Creatinine 1.10 0.67 - 1.17 mg/dL    GFR Estimate 74 >60 mL/min/1.73m2    Calcium 9.6 8.8 - 10.4 mg/dL    Chloride 104 98 - 107 mmol/L    Glucose 92 70 - 99 mg/dL    Alkaline Phosphatase 79 40 - 150 U/L    AST 24 0 - 45 U/L    ALT 20 0 - 70 U/L    Protein Total 7.5 6.4 - 8.3 g/dL    Albumin 4.5 3.5 - 5.2 g/dL    Bilirubin Total 1.4 (H) <=1.2 mg/dL    Patient Fasting > 8hrs? Yes    Lipid panel reflex to direct LDL Fasting     Status: Abnormal   Result Value Ref Range    Cholesterol 183 <200 mg/dL    Triglycerides 115 <150 mg/dL    Direct Measure HDL 42 >=40 mg/dL    LDL Cholesterol Calculated 118 (H) <100 mg/dL    Non HDL Cholesterol 141 (H) <130 mg/dL    Patient Fasting > 8hrs? Yes     Narrative    Cholesterol  Desirable: < 200 mg/dL  Borderline High: 200 - 239 mg/dL  High: >= 240 mg/dL    Triglycerides  Normal: < 150 mg/dL  Borderline High: 150 - 199 mg/dL  High: 200-499 mg/dL  Very High: >= 500 mg/dL    Direct Measure HDL  Female: >= 50 mg/dL   Male: >= 40 mg/dL    LDL Cholesterol  Desirable: < 100 mg/dL  Above  Desirable: 100 - 129 mg/dL   Borderline High: 130 - 159 mg/dL   High:  160 - 189 mg/dL   Very High: >= 190 mg/dL    Non HDL Cholesterol  Desirable: < 130 mg/dL  Above Desirable: 130 - 159 mg/dL  Borderline High: 160 - 189 mg/dL  High: 190 - 219 mg/dL  Very High: >= 220 mg/dL   CBC with platelets     Status: Normal   Result Value Ref Range    WBC Count 5.1 4.0 - 11.0 10e3/uL    RBC Count 4.93 4.40 - 5.90 10e6/uL    Hemoglobin 15.3 13.3 - 17.7 g/dL    Hematocrit 46.5 40.0 - 53.0 %    MCV 94 78 - 100 fL    MCH 31.0 26.5 - 33.0 pg    MCHC 32.9 31.5 - 36.5 g/dL    RDW 12.9 10.0 - 15.0 %    Platelet Count 183 150 - 450 10e3/uL   CK total     Status: Normal   Result Value Ref Range     39 - 308 U/L   UA Macroscopic with reflex to Microscopic and Culture     Status: Normal    Specimen: Urine, Midstream   Result Value Ref Range    Color Urine Yellow Colorless, Straw, Light Yellow, Yellow    Appearance Urine Clear Clear    Glucose Urine Negative Negative mg/dL    Bilirubin Urine Negative Negative    Ketones Urine Negative Negative mg/dL    Specific Gravity Urine 1.020 1.003 - 1.035    Blood Urine Negative Negative    pH Urine 6.5 5.0 - 7.0    Protein Albumin Urine Negative Negative mg/dL    Urobilinogen Urine 0.2 0.2, 1.0 E.U./dL    Nitrite Urine Negative Negative    Leukocyte Esterase Urine Negative Negative    Narrative    Microscopic not indicated   Albumin Random Urine Quantitative with Creat Ratio     Status: None   Result Value Ref Range    Creatinine Urine mg/dL 109.0 mg/dL    Albumin Urine mg/L <12.0 mg/L    Albumin Urine mg/g Cr     Prostate Specific Antigen Screen     Status: Normal   Result Value Ref Range    Prostate Specific Antigen Screen 0.72 0.00 - 4.50 ng/mL    Narrative    This result is obtained using the Roche Elecsys total PSA method on the haydee e801 immunoassay analyzer, which is an ultrasensitive method. Results obtained with different assay methods or kits cannot be used interchangeably.  This  test is intended for initial prostate cancer screening. PSA values exceeding the age-specific limits are suspicious for prostate disease, but additional testing, such as prostate biopsy, is needed to diagnose prostate pathology. The American [employer] Society recommends annual examination with digital rectal examination and serum PSA beginning at age 50 and for men with a life expectancy of at least 10 years after detection of prostate cancer. For men in high-risk groups, such as  Americans or men with a first-degree relative diagnosed at a younger age, testing should begin at a younger age. It is generally recommended that information be provided to patients about the benefits and limitations of testing and treatment so they can make informed decisions.   TSH     Status: Normal   Result Value Ref Range    TSH 3.14 0.30 - 4.20 uIU/mL   T4, free     Status: Normal   Result Value Ref Range    Free T4 1.26 0.90 - 1.70 ng/dL

## (undated) DEVICE — KIT ENDO TURNOVER/PROCEDURE W/CLEAN A SCOPE LINERS 103888

## (undated) DEVICE — ENDO FORCEP ENDOJAW BIOPSY 2.8MMX230CM FB-220U

## (undated) RX ORDER — FENTANYL CITRATE 50 UG/ML
INJECTION, SOLUTION INTRAMUSCULAR; INTRAVENOUS
Status: DISPENSED
Start: 2023-07-25